# Patient Record
Sex: FEMALE | Race: WHITE | NOT HISPANIC OR LATINO | Employment: OTHER | URBAN - METROPOLITAN AREA
[De-identification: names, ages, dates, MRNs, and addresses within clinical notes are randomized per-mention and may not be internally consistent; named-entity substitution may affect disease eponyms.]

---

## 2018-07-26 ENCOUNTER — HOSPITAL ENCOUNTER (OUTPATIENT)
Dept: GENERAL RADIOLOGY | Facility: HOSPITAL | Age: 78
Discharge: HOME | End: 2018-07-26
Admitting: COLON & RECTAL SURGERY

## 2018-07-26 ENCOUNTER — APPOINTMENT (OUTPATIENT)
Dept: PREADMISSION TESTING | Facility: HOSPITAL | Age: 78
End: 2018-07-26

## 2018-07-26 ENCOUNTER — DOCUMENTATION (OUTPATIENT)
Dept: OTHER | Facility: HOSPITAL | Age: 78
End: 2018-07-26

## 2018-07-26 VITALS — HEIGHT: 63 IN | BODY MASS INDEX: 24.8 KG/M2 | WEIGHT: 139.99 LBS

## 2018-07-26 LAB
ALBUMIN SERPL-MCNC: 4.29 G/DL (ref 3.2–4.8)
ALBUMIN/GLOB SERPL: 1.3 G/DL (ref 1.5–2.5)
ALP SERPL-CCNC: 103 U/L (ref 25–100)
ALT SERPL W P-5'-P-CCNC: 17 U/L (ref 7–40)
ANION GAP SERPL CALCULATED.3IONS-SCNC: 10 MMOL/L (ref 3–11)
AST SERPL-CCNC: 27 U/L (ref 0–33)
BILIRUB SERPL-MCNC: 0.5 MG/DL (ref 0.3–1.2)
BUN BLD-MCNC: 14 MG/DL (ref 9–23)
BUN/CREAT SERPL: 15.2 (ref 7–25)
CALCIUM SPEC-SCNC: 9.1 MG/DL (ref 8.7–10.4)
CEA SERPL-MCNC: 2.3 NG/ML (ref 0–2.5)
CHLORIDE SERPL-SCNC: 102 MMOL/L (ref 99–109)
CO2 SERPL-SCNC: 28 MMOL/L (ref 20–31)
CREAT BLD-MCNC: 0.92 MG/DL (ref 0.6–1.3)
DEPRECATED RDW RBC AUTO: 47.8 FL (ref 37–54)
ERYTHROCYTE [DISTWIDTH] IN BLOOD BY AUTOMATED COUNT: 14.7 % (ref 11.3–14.5)
GFR SERPL CREATININE-BSD FRML MDRD: 59 ML/MIN/1.73
GLOBULIN UR ELPH-MCNC: 3.2 GM/DL
GLUCOSE BLD-MCNC: 120 MG/DL (ref 70–100)
HBA1C MFR BLD: 6.2 % (ref 4.8–5.6)
HCT VFR BLD AUTO: 37.6 % (ref 34.5–44)
HGB BLD-MCNC: 11.9 G/DL (ref 11.5–15.5)
MCH RBC QN AUTO: 28.6 PG (ref 27–31)
MCHC RBC AUTO-ENTMCNC: 31.6 G/DL (ref 32–36)
MCV RBC AUTO: 90.4 FL (ref 80–99)
PLATELET # BLD AUTO: 200 10*3/MM3 (ref 150–450)
PMV BLD AUTO: 10.8 FL (ref 6–12)
POTASSIUM BLD-SCNC: 4.2 MMOL/L (ref 3.5–5.5)
PROT SERPL-MCNC: 7.5 G/DL (ref 5.7–8.2)
RBC # BLD AUTO: 4.16 10*6/MM3 (ref 3.89–5.14)
SODIUM BLD-SCNC: 140 MMOL/L (ref 132–146)
WBC NRBC COR # BLD: 13.69 10*3/MM3 (ref 3.5–10.8)

## 2018-07-26 PROCEDURE — 83036 HEMOGLOBIN GLYCOSYLATED A1C: CPT | Performed by: COLON & RECTAL SURGERY

## 2018-07-26 PROCEDURE — 93005 ELECTROCARDIOGRAM TRACING: CPT

## 2018-07-26 PROCEDURE — 93010 ELECTROCARDIOGRAM REPORT: CPT | Performed by: INTERNAL MEDICINE

## 2018-07-26 PROCEDURE — 71046 X-RAY EXAM CHEST 2 VIEWS: CPT

## 2018-07-26 PROCEDURE — 80053 COMPREHEN METABOLIC PANEL: CPT | Performed by: COLON & RECTAL SURGERY

## 2018-07-26 PROCEDURE — 85027 COMPLETE CBC AUTOMATED: CPT | Performed by: COLON & RECTAL SURGERY

## 2018-07-26 PROCEDURE — 82378 CARCINOEMBRYONIC ANTIGEN: CPT | Performed by: COLON & RECTAL SURGERY

## 2018-07-26 PROCEDURE — 36415 COLL VENOUS BLD VENIPUNCTURE: CPT

## 2018-07-26 RX ORDER — CELECOXIB 200 MG/1
200 CAPSULE ORAL DAILY
COMMUNITY
End: 2018-08-05 | Stop reason: HOSPADM

## 2018-07-26 RX ORDER — LEFLUNOMIDE 20 MG/1
20 TABLET ORAL DAILY
COMMUNITY
End: 2018-08-05 | Stop reason: HOSPADM

## 2018-07-26 NOTE — PROGRESS NOTES
[facility] called me to inform me of patient having surgery on 8/1/2018. Patient is scheduled for a right colectomy with ERAS orders per Dr. Juarez. I will be available to navigate as needed. LU

## 2018-07-31 ENCOUNTER — ANESTHESIA EVENT (OUTPATIENT)
Dept: PERIOP | Facility: HOSPITAL | Age: 78
End: 2018-07-31

## 2018-07-31 RX ORDER — FAMOTIDINE 10 MG/ML
20 INJECTION, SOLUTION INTRAVENOUS ONCE
Status: CANCELLED | OUTPATIENT
Start: 2018-07-31 | End: 2018-07-31

## 2018-08-01 ENCOUNTER — HOSPITAL ENCOUNTER (INPATIENT)
Facility: HOSPITAL | Age: 78
LOS: 4 days | Discharge: HOME | End: 2018-08-05
Attending: COLON & RECTAL SURGERY | Admitting: COLON & RECTAL SURGERY

## 2018-08-01 ENCOUNTER — ANESTHESIA (OUTPATIENT)
Dept: PERIOP | Facility: HOSPITAL | Age: 78
End: 2018-08-01

## 2018-08-01 DIAGNOSIS — C18.9 COLON CANCER (HCC): ICD-10-CM

## 2018-08-01 PROBLEM — M19.90 ARTHRITIS: Status: ACTIVE | Noted: 2018-08-01

## 2018-08-01 PROBLEM — I10 ESSENTIAL HYPERTENSION: Status: ACTIVE | Noted: 2018-08-01

## 2018-08-01 LAB — POTASSIUM BLDA-SCNC: 3.94 MMOL/L (ref 3.5–5.3)

## 2018-08-01 PROCEDURE — 25010000002 PROPOFOL 10 MG/ML EMULSION: Performed by: NURSE ANESTHETIST, CERTIFIED REGISTERED

## 2018-08-01 PROCEDURE — 99221 1ST HOSP IP/OBS SF/LOW 40: CPT | Performed by: NURSE PRACTITIONER

## 2018-08-01 PROCEDURE — 25010000002 ONDANSETRON PER 1 MG: Performed by: NURSE ANESTHETIST, CERTIFIED REGISTERED

## 2018-08-01 PROCEDURE — 88342 IMHCHEM/IMCYTCHM 1ST ANTB: CPT

## 2018-08-01 PROCEDURE — 25010000002 DEXAMETHASONE SODIUM PHOSPHATE 10 MG/ML SOLUTION 1 ML VIAL: Performed by: NURSE ANESTHETIST, CERTIFIED REGISTERED

## 2018-08-01 PROCEDURE — 25010000002 NEOSTIGMINE 10 MG/10ML SOLUTION: Performed by: NURSE ANESTHETIST, CERTIFIED REGISTERED

## 2018-08-01 PROCEDURE — 25010000002 DEXAMETHASONE PER 1 MG: Performed by: NURSE ANESTHETIST, CERTIFIED REGISTERED

## 2018-08-01 PROCEDURE — 25010000002 BUPRENORPHINE PER 0.1 MG: Performed by: NURSE ANESTHETIST, CERTIFIED REGISTERED

## 2018-08-01 PROCEDURE — 25010000002 HEPARIN (PORCINE) PER 1000 UNITS: Performed by: COLON & RECTAL SURGERY

## 2018-08-01 PROCEDURE — 0DBF0ZZ EXCISION OF RIGHT LARGE INTESTINE, OPEN APPROACH: ICD-10-PCS | Performed by: COLON & RECTAL SURGERY

## 2018-08-01 PROCEDURE — 84132 ASSAY OF SERUM POTASSIUM: CPT | Performed by: ANESTHESIOLOGY

## 2018-08-01 PROCEDURE — 25010000002 FENTANYL CITRATE (PF) 100 MCG/2ML SOLUTION: Performed by: NURSE ANESTHETIST, CERTIFIED REGISTERED

## 2018-08-01 PROCEDURE — 88341 IMHCHEM/IMCYTCHM EA ADD ANTB: CPT

## 2018-08-01 PROCEDURE — 25010000002 FENTANYL CITRATE (PF) 100 MCG/2ML SOLUTION: Performed by: ANESTHESIOLOGY

## 2018-08-01 PROCEDURE — 25010000002 HYDROMORPHONE PER 4 MG: Performed by: ANESTHESIOLOGY

## 2018-08-01 PROCEDURE — 25010000002 ERTAPENEM 1 GM/100ML SOLUTION: Performed by: COLON & RECTAL SURGERY

## 2018-08-01 PROCEDURE — 25010000002 MORPHINE SULFATE (PF) 2 MG/ML SOLUTION: Performed by: COLON & RECTAL SURGERY

## 2018-08-01 PROCEDURE — 88309 TISSUE EXAM BY PATHOLOGIST: CPT | Performed by: COLON & RECTAL SURGERY

## 2018-08-01 RX ORDER — SODIUM CHLORIDE 0.9 % (FLUSH) 0.9 %
1-10 SYRINGE (ML) INJECTION AS NEEDED
Status: DISCONTINUED | OUTPATIENT
Start: 2018-08-01 | End: 2018-08-01 | Stop reason: HOSPADM

## 2018-08-01 RX ORDER — HYDROCODONE BITARTRATE AND ACETAMINOPHEN 7.5; 325 MG/1; MG/1
1 TABLET ORAL EVERY 4 HOURS PRN
Status: DISCONTINUED | OUTPATIENT
Start: 2018-08-01 | End: 2018-08-05 | Stop reason: HOSPADM

## 2018-08-01 RX ORDER — PREGABALIN 75 MG/1
75 CAPSULE ORAL ONCE
Status: COMPLETED | OUTPATIENT
Start: 2018-08-01 | End: 2018-08-01

## 2018-08-01 RX ORDER — NEOSTIGMINE METHYLSULFATE 1 MG/ML
INJECTION, SOLUTION INTRAVENOUS AS NEEDED
Status: DISCONTINUED | OUTPATIENT
Start: 2018-08-01 | End: 2018-08-01 | Stop reason: SURG

## 2018-08-01 RX ORDER — NALOXONE HCL 0.4 MG/ML
0.4 VIAL (ML) INJECTION
Status: DISCONTINUED | OUTPATIENT
Start: 2018-08-01 | End: 2018-08-05 | Stop reason: HOSPADM

## 2018-08-01 RX ORDER — OXYCODONE HYDROCHLORIDE AND ACETAMINOPHEN 5; 325 MG/1; MG/1
1 TABLET ORAL ONCE AS NEEDED
Status: DISCONTINUED | OUTPATIENT
Start: 2018-08-01 | End: 2018-08-01 | Stop reason: HOSPADM

## 2018-08-01 RX ORDER — GABAPENTIN 400 MG/1
400 CAPSULE ORAL EVERY 12 HOURS SCHEDULED
Status: COMPLETED | OUTPATIENT
Start: 2018-08-01 | End: 2018-08-04

## 2018-08-01 RX ORDER — MORPHINE SULFATE 2 MG/ML
1 INJECTION, SOLUTION INTRAMUSCULAR; INTRAVENOUS
Status: DISCONTINUED | OUTPATIENT
Start: 2018-08-01 | End: 2018-08-05 | Stop reason: HOSPADM

## 2018-08-01 RX ORDER — LIDOCAINE HYDROCHLORIDE 10 MG/ML
0.5 INJECTION, SOLUTION EPIDURAL; INFILTRATION; INTRACAUDAL; PERINEURAL ONCE AS NEEDED
Status: COMPLETED | OUTPATIENT
Start: 2018-08-01 | End: 2018-08-01

## 2018-08-01 RX ORDER — MAGNESIUM HYDROXIDE 1200 MG/15ML
LIQUID ORAL AS NEEDED
Status: DISCONTINUED | OUTPATIENT
Start: 2018-08-01 | End: 2018-08-01 | Stop reason: HOSPADM

## 2018-08-01 RX ORDER — HEPARIN SODIUM 5000 [USP'U]/ML
5000 INJECTION, SOLUTION INTRAVENOUS; SUBCUTANEOUS ONCE
Status: COMPLETED | OUTPATIENT
Start: 2018-08-01 | End: 2018-08-01

## 2018-08-01 RX ORDER — ONDANSETRON 2 MG/ML
INJECTION INTRAMUSCULAR; INTRAVENOUS AS NEEDED
Status: DISCONTINUED | OUTPATIENT
Start: 2018-08-01 | End: 2018-08-01 | Stop reason: SURG

## 2018-08-01 RX ORDER — ATRACURIUM BESYLATE 10 MG/ML
INJECTION, SOLUTION INTRAVENOUS AS NEEDED
Status: DISCONTINUED | OUTPATIENT
Start: 2018-08-01 | End: 2018-08-01 | Stop reason: SURG

## 2018-08-01 RX ORDER — ALVIMOPAN 12 MG/1
12 CAPSULE ORAL ONCE
Status: COMPLETED | OUTPATIENT
Start: 2018-08-01 | End: 2018-08-01

## 2018-08-01 RX ORDER — LABETALOL HYDROCHLORIDE 5 MG/ML
5 INJECTION, SOLUTION INTRAVENOUS
Status: DISCONTINUED | OUTPATIENT
Start: 2018-08-01 | End: 2018-08-01 | Stop reason: HOSPADM

## 2018-08-01 RX ORDER — SODIUM CHLORIDE 9 MG/ML
100 INJECTION, SOLUTION INTRAVENOUS ONCE
Status: COMPLETED | OUTPATIENT
Start: 2018-08-01 | End: 2018-08-01

## 2018-08-01 RX ORDER — HYDROMORPHONE HYDROCHLORIDE 1 MG/ML
0.5 INJECTION, SOLUTION INTRAMUSCULAR; INTRAVENOUS; SUBCUTANEOUS
Status: DISCONTINUED | OUTPATIENT
Start: 2018-08-01 | End: 2018-08-01 | Stop reason: HOSPADM

## 2018-08-01 RX ORDER — SODIUM CHLORIDE 9 MG/ML
500 INJECTION, SOLUTION INTRAVENOUS CONTINUOUS
Status: DISCONTINUED | OUTPATIENT
Start: 2018-08-01 | End: 2018-08-01

## 2018-08-01 RX ORDER — FENTANYL CITRATE 50 UG/ML
25 INJECTION, SOLUTION INTRAMUSCULAR; INTRAVENOUS
Status: COMPLETED | OUTPATIENT
Start: 2018-08-01 | End: 2018-08-01

## 2018-08-01 RX ORDER — GLYCOPYRROLATE 0.2 MG/ML
INJECTION INTRAMUSCULAR; INTRAVENOUS AS NEEDED
Status: DISCONTINUED | OUTPATIENT
Start: 2018-08-01 | End: 2018-08-01 | Stop reason: SURG

## 2018-08-01 RX ORDER — EPHEDRINE SULFATE 50 MG/ML
5 INJECTION, SOLUTION INTRAVENOUS ONCE AS NEEDED
Status: DISCONTINUED | OUTPATIENT
Start: 2018-08-01 | End: 2018-08-01 | Stop reason: HOSPADM

## 2018-08-01 RX ORDER — SODIUM CHLORIDE, SODIUM LACTATE, POTASSIUM CHLORIDE, CALCIUM CHLORIDE 600; 310; 30; 20 MG/100ML; MG/100ML; MG/100ML; MG/100ML
9 INJECTION, SOLUTION INTRAVENOUS CONTINUOUS
Status: DISCONTINUED | OUTPATIENT
Start: 2018-08-01 | End: 2018-08-01

## 2018-08-01 RX ORDER — ONDANSETRON 2 MG/ML
4 INJECTION INTRAMUSCULAR; INTRAVENOUS EVERY 6 HOURS PRN
Status: DISCONTINUED | OUTPATIENT
Start: 2018-08-01 | End: 2018-08-05 | Stop reason: HOSPADM

## 2018-08-01 RX ORDER — FENTANYL CITRATE 50 UG/ML
50 INJECTION, SOLUTION INTRAMUSCULAR; INTRAVENOUS
Status: DISCONTINUED | OUTPATIENT
Start: 2018-08-01 | End: 2018-08-01 | Stop reason: HOSPADM

## 2018-08-01 RX ORDER — AMLODIPINE BESYLATE 5 MG/1
1 TABLET ORAL EVERY 24 HOURS
COMMUNITY
End: 2018-09-17

## 2018-08-01 RX ORDER — MEPERIDINE HYDROCHLORIDE 25 MG/ML
12.5 INJECTION INTRAMUSCULAR; INTRAVENOUS; SUBCUTANEOUS
Status: DISCONTINUED | OUTPATIENT
Start: 2018-08-01 | End: 2018-08-01 | Stop reason: HOSPADM

## 2018-08-01 RX ORDER — SODIUM CHLORIDE, SODIUM LACTATE, POTASSIUM CHLORIDE, CALCIUM CHLORIDE 600; 310; 30; 20 MG/100ML; MG/100ML; MG/100ML; MG/100ML
100 INJECTION, SOLUTION INTRAVENOUS CONTINUOUS
Status: DISCONTINUED | OUTPATIENT
Start: 2018-08-01 | End: 2018-08-05 | Stop reason: HOSPADM

## 2018-08-01 RX ORDER — PROPOFOL 10 MG/ML
VIAL (ML) INTRAVENOUS AS NEEDED
Status: DISCONTINUED | OUTPATIENT
Start: 2018-08-01 | End: 2018-08-01 | Stop reason: SURG

## 2018-08-01 RX ORDER — DEXAMETHASONE SODIUM PHOSPHATE 4 MG/ML
INJECTION, SOLUTION INTRA-ARTICULAR; INTRALESIONAL; INTRAMUSCULAR; INTRAVENOUS; SOFT TISSUE AS NEEDED
Status: DISCONTINUED | OUTPATIENT
Start: 2018-08-01 | End: 2018-08-01 | Stop reason: SURG

## 2018-08-01 RX ORDER — MELOXICAM 7.5 MG/1
15 TABLET ORAL ONCE
Status: COMPLETED | OUTPATIENT
Start: 2018-08-01 | End: 2018-08-01

## 2018-08-01 RX ORDER — ACETAMINOPHEN 500 MG
1000 TABLET ORAL ONCE
Status: COMPLETED | OUTPATIENT
Start: 2018-08-01 | End: 2018-08-01

## 2018-08-01 RX ORDER — ALVIMOPAN 12 MG/1
12 CAPSULE ORAL 2 TIMES DAILY
Status: DISCONTINUED | OUTPATIENT
Start: 2018-08-02 | End: 2018-08-05 | Stop reason: HOSPADM

## 2018-08-01 RX ORDER — NALOXONE HCL 0.4 MG/ML
0.4 VIAL (ML) INJECTION AS NEEDED
Status: DISCONTINUED | OUTPATIENT
Start: 2018-08-01 | End: 2018-08-01 | Stop reason: HOSPADM

## 2018-08-01 RX ORDER — HYDRALAZINE HYDROCHLORIDE 10 MG/1
10 TABLET, FILM COATED ORAL EVERY 8 HOURS PRN
Status: DISCONTINUED | OUTPATIENT
Start: 2018-08-01 | End: 2018-08-05 | Stop reason: HOSPADM

## 2018-08-01 RX ORDER — ONDANSETRON 2 MG/ML
4 INJECTION INTRAMUSCULAR; INTRAVENOUS ONCE AS NEEDED
Status: DISCONTINUED | OUTPATIENT
Start: 2018-08-01 | End: 2018-08-01 | Stop reason: HOSPADM

## 2018-08-01 RX ORDER — SODIUM CHLORIDE 9 MG/ML
1000 INJECTION, SOLUTION INTRAVENOUS CONTINUOUS
Status: DISCONTINUED | OUTPATIENT
Start: 2018-08-01 | End: 2018-08-05 | Stop reason: HOSPADM

## 2018-08-01 RX ORDER — ACETAMINOPHEN 500 MG
1000 TABLET ORAL 3 TIMES DAILY
Status: DISCONTINUED | OUTPATIENT
Start: 2018-08-01 | End: 2018-08-05 | Stop reason: HOSPADM

## 2018-08-01 RX ORDER — DIAZEPAM 5 MG/1
5 TABLET ORAL EVERY 6 HOURS PRN
Status: DISCONTINUED | OUTPATIENT
Start: 2018-08-01 | End: 2018-08-05 | Stop reason: HOSPADM

## 2018-08-01 RX ORDER — LIDOCAINE HYDROCHLORIDE 10 MG/ML
INJECTION, SOLUTION EPIDURAL; INFILTRATION; INTRACAUDAL; PERINEURAL AS NEEDED
Status: DISCONTINUED | OUTPATIENT
Start: 2018-08-01 | End: 2018-08-01 | Stop reason: SURG

## 2018-08-01 RX ORDER — FAMOTIDINE 20 MG/1
20 TABLET, FILM COATED ORAL ONCE
Status: COMPLETED | OUTPATIENT
Start: 2018-08-01 | End: 2018-08-01

## 2018-08-01 RX ORDER — AMLODIPINE BESYLATE 5 MG/1
5 TABLET ORAL EVERY 24 HOURS
Status: DISCONTINUED | OUTPATIENT
Start: 2018-08-02 | End: 2018-08-05 | Stop reason: HOSPADM

## 2018-08-01 RX ORDER — SCOLOPAMINE TRANSDERMAL SYSTEM 1 MG/1
1 PATCH, EXTENDED RELEASE TRANSDERMAL
Status: DISCONTINUED | OUTPATIENT
Start: 2018-08-01 | End: 2018-08-01

## 2018-08-01 RX ADMIN — GLYCOPYRROLATE 0.4 MG: 0.2 INJECTION INTRAMUSCULAR; INTRAVENOUS at 16:20

## 2018-08-01 RX ADMIN — LIDOCAINE HYDROCHLORIDE 0.5 ML: 10 INJECTION, SOLUTION EPIDURAL; INFILTRATION; INTRACAUDAL; PERINEURAL at 13:55

## 2018-08-01 RX ADMIN — MELOXICAM 15 MG: 7.5 TABLET ORAL at 14:05

## 2018-08-01 RX ADMIN — LIDOCAINE HYDROCHLORIDE 40 MG: 10 INJECTION, SOLUTION EPIDURAL; INFILTRATION; INTRACAUDAL; PERINEURAL at 15:06

## 2018-08-01 RX ADMIN — NEOSTIGMINE METHYLSULFATE 3 MG: 1 INJECTION, SOLUTION INTRAVENOUS at 16:20

## 2018-08-01 RX ADMIN — FENTANYL CITRATE 25 MCG: 50 INJECTION INTRAMUSCULAR; INTRAVENOUS at 17:01

## 2018-08-01 RX ADMIN — ONDANSETRON 4 MG: 2 INJECTION INTRAMUSCULAR; INTRAVENOUS at 16:12

## 2018-08-01 RX ADMIN — GLYCOPYRROLATE 0.2 MG: 0.2 INJECTION INTRAMUSCULAR; INTRAVENOUS at 15:42

## 2018-08-01 RX ADMIN — ATRACURIUM BESYLATE 50 MG: 10 INJECTION, SOLUTION INTRAVENOUS at 15:06

## 2018-08-01 RX ADMIN — FENTANYL CITRATE 50 MCG: 50 INJECTION INTRAMUSCULAR; INTRAVENOUS at 17:30

## 2018-08-01 RX ADMIN — SODIUM CHLORIDE 1000 ML: 9 INJECTION, SOLUTION INTRAVENOUS at 13:55

## 2018-08-01 RX ADMIN — MORPHINE SULFATE 1 MG: 2 INJECTION, SOLUTION INTRAMUSCULAR; INTRAVENOUS at 19:03

## 2018-08-01 RX ADMIN — HEPARIN SODIUM 5000 UNITS: 5000 INJECTION, SOLUTION INTRAVENOUS; SUBCUTANEOUS at 14:10

## 2018-08-01 RX ADMIN — HYDROCODONE BITARTRATE AND ACETAMINOPHEN 1 TABLET: 7.5; 325 TABLET ORAL at 20:18

## 2018-08-01 RX ADMIN — HYDROMORPHONE HYDROCHLORIDE 0.5 MG: 1 INJECTION, SOLUTION INTRAMUSCULAR; INTRAVENOUS; SUBCUTANEOUS at 17:25

## 2018-08-01 RX ADMIN — FENTANYL CITRATE 25 MCG: 50 INJECTION INTRAMUSCULAR; INTRAVENOUS at 17:11

## 2018-08-01 RX ADMIN — ACETAMINOPHEN 1000 MG: 500 TABLET, FILM COATED ORAL at 14:05

## 2018-08-01 RX ADMIN — DIAZEPAM 5 MG: 5 TABLET ORAL at 23:58

## 2018-08-01 RX ADMIN — SODIUM CHLORIDE 100 ML/HR: 9 INJECTION, SOLUTION INTRAVENOUS at 18:11

## 2018-08-01 RX ADMIN — ERTAPENEM SODIUM 1 G: 1 INJECTION, POWDER, LYOPHILIZED, FOR SOLUTION INTRAMUSCULAR; INTRAVENOUS at 15:10

## 2018-08-01 RX ADMIN — GABAPENTIN 400 MG: 400 CAPSULE ORAL at 21:10

## 2018-08-01 RX ADMIN — AMLODIPINE BESYLATE 5 MG: 5 TABLET ORAL at 23:58

## 2018-08-01 RX ADMIN — PREGABALIN 75 MG: 75 CAPSULE ORAL at 14:05

## 2018-08-01 RX ADMIN — DEXAMETHASONE SODIUM PHOSPHATE 6 MG: 4 INJECTION, SOLUTION INTRAMUSCULAR; INTRAVENOUS at 15:06

## 2018-08-01 RX ADMIN — PROPOFOL 100 MG: 10 INJECTION, EMULSION INTRAVENOUS at 15:06

## 2018-08-01 RX ADMIN — FAMOTIDINE 20 MG: 20 TABLET ORAL at 14:05

## 2018-08-01 RX ADMIN — DEXAMETHASONE SODIUM PHOSPHATE 60.9 ML: 10 INJECTION, SOLUTION INTRAMUSCULAR; INTRAVENOUS at 15:07

## 2018-08-01 RX ADMIN — ALVIMOPAN 12 MG: 12 CAPSULE ORAL at 14:25

## 2018-08-01 NOTE — ANESTHESIA POSTPROCEDURE EVALUATION
Patient: Su Pedraza    Procedure Summary     Date:  08/01/18 Room / Location:   [facility]    Anesthesia Start:  1503 Anesthesia Stop:      Procedure:  extended right hemicolectomy with end bloc node dissection (Right Abdomen) Diagnosis:      Surgeon:  Clovis Juarez MD Provider:  Clovis Monet MD    Anesthesia Type:  general ASA Status:  2          Anesthesia Type: general  Last vitals  BP   169/69 (08/01/18 1403)135/66   Temp   98 °F (36.7 °C) (08/01/18 1403)97.5   Pulse   59 (08/01/18 1403)79   Resp   18 (08/01/18 1403)  16   SpO2   98 % (08/01/18 1403)94     Post Anesthesia Care and Evaluation    Patient location during evaluation: PACU  Patient participation: complete - patient participated  Level of consciousness: awake and alert  Pain score: 0  Pain management: adequate  Airway patency: patent  Anesthetic complications: No anesthetic complications  PONV Status: none  Cardiovascular status: hemodynamically stable and acceptable  Respiratory status: nonlabored ventilation, acceptable and nasal cannula  Hydration status: acceptable

## 2018-08-01 NOTE — ANESTHESIA PROCEDURE NOTES
Airway  Urgency: elective    Airway not difficult    General Information and Staff    Patient location during procedure: OR  Anesthesiologist: PATRICIA FRANCOIS  CRNA: JUAN LAZO    Indications and Patient Condition  Indications for airway management: airway protection    Preoxygenated: yes  MILS not maintained throughout  Mask difficulty assessment: 1 - vent by mask    Final Airway Details  Final airway type: endotracheal airway      Successful airway: ETT  Cuffed: yes   Successful intubation technique: direct laryngoscopy  Endotracheal tube insertion site: oral  Blade: Soo  Blade size: #3  ETT size: 7.0 mm  Cormack-Lehane Classification: grade I - full view of glottis  Placement verified by: chest auscultation and capnometry   Cuff volume (mL): 5  Measured from: lips  ETT to lips (cm): 20  Number of attempts at approach: 1    Additional Comments  Negative epigastric sounds, Breath sound equal bilaterally with symmetric chest rise and fall

## 2018-08-01 NOTE — ANESTHESIA PREPROCEDURE EVALUATION
Anesthesia Evaluation                  Airway   Mallampati: I  TM distance: >3 FB  Neck ROM: full  No difficulty expected  Dental      Pulmonary    Cardiovascular     (+) hypertension,       Neuro/Psych  GI/Hepatic/Renal/Endo      Musculoskeletal     Abdominal    Substance History      OB/GYN          Other                        Anesthesia Plan    ASA 2     general     intravenous induction   Anesthetic plan and risks discussed with patient.    Plan discussed with CRNA.

## 2018-08-01 NOTE — ANESTHESIA PROCEDURE NOTES
Peripheral Block    Patient location during procedure: OR  Reason for block: at surgeon's request and post-op pain management  Performed by  Anesthesiologist: PATRICIA FRANCOIS  CRNA: JUAN LAZO  Preanesthetic Checklist  Completed: patient identified, site marked, surgical consent, pre-op evaluation, timeout performed, IV checked, risks and benefits discussed and monitors and equipment checked  Prep:  Pt Position: supine  Sterile barriers:cap, gloves, sterile barriers and mask  Prep: ChloraPrep  Patient monitoring: blood pressure monitoring, continuous pulse oximetry and EKG  Procedure  Sedation:yes  Performed under: general  Guidance:ultrasound guided  Images:still images obtained    Laterality:Bilateral  Block Type:TAP  Injection Technique:single-shot  Needle Type:short-bevel and echogenic  Needle Gauge:20 G    Medications  Comment:Block Injection:  LA dose divided between Right and Left block       Adjuncts:  Decadron 4mg PSF, Buprenex 0.15mg (Per total volume of LA)  Local Injected:bupivacaine 0.25% Local Amount Injected:60mL  Post Assessment  Injection Assessment: negative aspiration for heme, incremental injection and no paresthesia on injection  Patient Tolerance:comfortable throughout block  Complications:no  Additional Notes      Under Ultrasound guidance, a BBraun 4inch 360 degree needle was advanced with Normal Saline hydro dissection of tissue.  The Internal Oblique and Transversus Abdominus muscles where visualized.  At or before the aponeurosis of Internal Oblique, local anesthetic spread was visualized in the Transversus Abdominus Plane. Injection was made incrementally with aspiration every 5 mls.  There was no  intravascular injection,  injection pressure was normal, there was no neural injection, and the procedure was completed without difficulty.  Thank You.

## 2018-08-02 LAB
ANION GAP SERPL CALCULATED.3IONS-SCNC: 7 MMOL/L (ref 3–11)
BASOPHILS # BLD AUTO: 0.01 10*3/MM3 (ref 0–0.2)
BASOPHILS NFR BLD AUTO: 0.1 % (ref 0–1)
BUN BLD-MCNC: 12 MG/DL (ref 9–23)
BUN/CREAT SERPL: 17.6 (ref 7–25)
CALCIUM SPEC-SCNC: 7.4 MG/DL (ref 8.7–10.4)
CHLORIDE SERPL-SCNC: 109 MMOL/L (ref 99–109)
CO2 SERPL-SCNC: 23 MMOL/L (ref 20–31)
CREAT BLD-MCNC: 0.68 MG/DL (ref 0.6–1.3)
DEPRECATED RDW RBC AUTO: 50.6 FL (ref 37–54)
EOSINOPHIL # BLD AUTO: 0 10*3/MM3 (ref 0–0.3)
EOSINOPHIL NFR BLD AUTO: 0 % (ref 0–3)
ERYTHROCYTE [DISTWIDTH] IN BLOOD BY AUTOMATED COUNT: 14.8 % (ref 11.3–14.5)
GFR SERPL CREATININE-BSD FRML MDRD: 83 ML/MIN/1.73
GLUCOSE BLD-MCNC: 135 MG/DL (ref 70–100)
HCT VFR BLD AUTO: 35.7 % (ref 34.5–44)
HGB BLD-MCNC: 11 G/DL (ref 11.5–15.5)
IMM GRANULOCYTES # BLD: 0.03 10*3/MM3 (ref 0–0.03)
IMM GRANULOCYTES NFR BLD: 0.2 % (ref 0–0.6)
LYMPHOCYTES # BLD AUTO: 4.07 10*3/MM3 (ref 0.6–4.8)
LYMPHOCYTES NFR BLD AUTO: 31.9 % (ref 24–44)
MCH RBC QN AUTO: 28.6 PG (ref 27–31)
MCHC RBC AUTO-ENTMCNC: 30.8 G/DL (ref 32–36)
MCV RBC AUTO: 93 FL (ref 80–99)
MONOCYTES # BLD AUTO: 0.58 10*3/MM3 (ref 0–1)
MONOCYTES NFR BLD AUTO: 4.6 % (ref 0–12)
NEUTROPHILS # BLD AUTO: 8.05 10*3/MM3 (ref 1.5–8.3)
NEUTROPHILS NFR BLD AUTO: 63.2 % (ref 41–71)
PLAT MORPH BLD: NORMAL
PLATELET # BLD AUTO: 175 10*3/MM3 (ref 150–450)
PMV BLD AUTO: 11.4 FL (ref 6–12)
POTASSIUM BLD-SCNC: 4.5 MMOL/L (ref 3.5–5.5)
RBC # BLD AUTO: 3.84 10*6/MM3 (ref 3.89–5.14)
RBC MORPH BLD: NORMAL
SODIUM BLD-SCNC: 139 MMOL/L (ref 132–146)
WBC MORPH BLD: NORMAL
WBC NRBC COR # BLD: 12.74 10*3/MM3 (ref 3.5–10.8)

## 2018-08-02 PROCEDURE — 99233 SBSQ HOSP IP/OBS HIGH 50: CPT | Performed by: HOSPITALIST

## 2018-08-02 PROCEDURE — 25010000002 ENOXAPARIN PER 10 MG: Performed by: COLON & RECTAL SURGERY

## 2018-08-02 PROCEDURE — 80048 BASIC METABOLIC PNL TOTAL CA: CPT | Performed by: COLON & RECTAL SURGERY

## 2018-08-02 PROCEDURE — 85025 COMPLETE CBC W/AUTO DIFF WBC: CPT | Performed by: COLON & RECTAL SURGERY

## 2018-08-02 PROCEDURE — 85007 BL SMEAR W/DIFF WBC COUNT: CPT | Performed by: COLON & RECTAL SURGERY

## 2018-08-02 RX ADMIN — ALVIMOPAN 12 MG: 12 CAPSULE ORAL at 08:02

## 2018-08-02 RX ADMIN — ACETAMINOPHEN 1000 MG: 500 TABLET, FILM COATED ORAL at 08:02

## 2018-08-02 RX ADMIN — GABAPENTIN 400 MG: 400 CAPSULE ORAL at 08:02

## 2018-08-02 RX ADMIN — HYDROCODONE BITARTRATE AND ACETAMINOPHEN 1 TABLET: 7.5; 325 TABLET ORAL at 16:06

## 2018-08-02 RX ADMIN — ENOXAPARIN SODIUM 40 MG: 40 INJECTION SUBCUTANEOUS at 08:02

## 2018-08-02 RX ADMIN — HYDROCODONE BITARTRATE AND ACETAMINOPHEN 1 TABLET: 7.5; 325 TABLET ORAL at 08:02

## 2018-08-02 RX ADMIN — ACETAMINOPHEN 1000 MG: 500 TABLET, FILM COATED ORAL at 21:13

## 2018-08-02 RX ADMIN — GABAPENTIN 400 MG: 400 CAPSULE ORAL at 21:13

## 2018-08-02 RX ADMIN — ALVIMOPAN 12 MG: 12 CAPSULE ORAL at 21:13

## 2018-08-03 PROCEDURE — 25010000002 ENOXAPARIN PER 10 MG: Performed by: COLON & RECTAL SURGERY

## 2018-08-03 PROCEDURE — 94799 UNLISTED PULMONARY SVC/PX: CPT

## 2018-08-03 PROCEDURE — 99232 SBSQ HOSP IP/OBS MODERATE 35: CPT | Performed by: NURSE PRACTITIONER

## 2018-08-03 PROCEDURE — 25010000002 ONDANSETRON PER 1 MG: Performed by: COLON & RECTAL SURGERY

## 2018-08-03 RX ADMIN — ENOXAPARIN SODIUM 40 MG: 40 INJECTION SUBCUTANEOUS at 09:28

## 2018-08-03 RX ADMIN — ALVIMOPAN 12 MG: 12 CAPSULE ORAL at 20:42

## 2018-08-03 RX ADMIN — AMLODIPINE BESYLATE 5 MG: 5 TABLET ORAL at 00:25

## 2018-08-03 RX ADMIN — GABAPENTIN 400 MG: 400 CAPSULE ORAL at 09:28

## 2018-08-03 RX ADMIN — HYDROCODONE BITARTRATE AND ACETAMINOPHEN 1 TABLET: 7.5; 325 TABLET ORAL at 09:28

## 2018-08-03 RX ADMIN — ALVIMOPAN 12 MG: 12 CAPSULE ORAL at 09:28

## 2018-08-03 RX ADMIN — ONDANSETRON 4 MG: 2 INJECTION INTRAMUSCULAR; INTRAVENOUS at 09:23

## 2018-08-03 RX ADMIN — ACETAMINOPHEN 1000 MG: 500 TABLET, FILM COATED ORAL at 20:42

## 2018-08-03 RX ADMIN — GABAPENTIN 400 MG: 400 CAPSULE ORAL at 20:42

## 2018-08-03 RX ADMIN — DIAZEPAM 5 MG: 5 TABLET ORAL at 11:07

## 2018-08-04 PROCEDURE — 25010000002 ENOXAPARIN PER 10 MG: Performed by: COLON & RECTAL SURGERY

## 2018-08-04 PROCEDURE — 99232 SBSQ HOSP IP/OBS MODERATE 35: CPT | Performed by: NURSE PRACTITIONER

## 2018-08-04 RX ADMIN — ACETAMINOPHEN 1000 MG: 500 TABLET, FILM COATED ORAL at 21:29

## 2018-08-04 RX ADMIN — ALVIMOPAN 12 MG: 12 CAPSULE ORAL at 21:29

## 2018-08-04 RX ADMIN — ENOXAPARIN SODIUM 40 MG: 40 INJECTION SUBCUTANEOUS at 10:16

## 2018-08-04 RX ADMIN — HYDROCODONE BITARTRATE AND ACETAMINOPHEN 1 TABLET: 7.5; 325 TABLET ORAL at 10:15

## 2018-08-04 RX ADMIN — GABAPENTIN 400 MG: 400 CAPSULE ORAL at 10:16

## 2018-08-04 RX ADMIN — AMLODIPINE BESYLATE 5 MG: 5 TABLET ORAL at 00:19

## 2018-08-04 RX ADMIN — ACETAMINOPHEN 1000 MG: 500 TABLET, FILM COATED ORAL at 16:44

## 2018-08-04 RX ADMIN — ALVIMOPAN 12 MG: 12 CAPSULE ORAL at 10:15

## 2018-08-05 VITALS
RESPIRATION RATE: 16 BRPM | HEART RATE: 86 BPM | HEIGHT: 63 IN | WEIGHT: 139.99 LBS | OXYGEN SATURATION: 94 % | BODY MASS INDEX: 24.8 KG/M2 | SYSTOLIC BLOOD PRESSURE: 151 MMHG | TEMPERATURE: 98.1 F | DIASTOLIC BLOOD PRESSURE: 82 MMHG

## 2018-08-05 PROCEDURE — 25010000002 ENOXAPARIN PER 10 MG: Performed by: COLON & RECTAL SURGERY

## 2018-08-05 PROCEDURE — 25010000002 ONDANSETRON PER 1 MG: Performed by: COLON & RECTAL SURGERY

## 2018-08-05 PROCEDURE — 99239 HOSP IP/OBS DSCHRG MGMT >30: CPT | Performed by: NURSE PRACTITIONER

## 2018-08-05 RX ADMIN — ONDANSETRON 4 MG: 2 INJECTION INTRAMUSCULAR; INTRAVENOUS at 10:08

## 2018-08-05 RX ADMIN — AMLODIPINE BESYLATE 5 MG: 5 TABLET ORAL at 00:16

## 2018-08-05 RX ADMIN — ACETAMINOPHEN 1000 MG: 500 TABLET, FILM COATED ORAL at 08:37

## 2018-08-05 RX ADMIN — ACETAMINOPHEN 1000 MG: 500 TABLET, FILM COATED ORAL at 16:04

## 2018-08-05 RX ADMIN — ALVIMOPAN 12 MG: 12 CAPSULE ORAL at 10:10

## 2018-08-05 RX ADMIN — ENOXAPARIN SODIUM 40 MG: 40 INJECTION SUBCUTANEOUS at 08:37

## 2018-08-06 ENCOUNTER — READMISSION MANAGEMENT (OUTPATIENT)
Dept: CALL CENTER | Facility: HOSPITAL | Age: 78
End: 2018-08-06

## 2018-08-06 NOTE — OUTREACH NOTE
Prep Survey      Responses   Facility patient discharged from?  [place]   Is patient eligible?  Yes   Discharge diagnosis  s/p open colectomy   Does the patient have one of the following disease processes/diagnoses(primary or secondary)?  General Surgery   Does the patient have Home health ordered?  No   Is there a DME ordered?  No   Prep survey completed?  Yes          Yancy Alonzo RN

## 2018-08-07 ENCOUNTER — READMISSION MANAGEMENT (OUTPATIENT)
Dept: CALL CENTER | Facility: HOSPITAL | Age: 78
End: 2018-08-07

## 2018-08-07 NOTE — OUTREACH NOTE
General Surgery Week 1 Survey      Responses   Facility patient discharged from?  [place]   Does the patient have one of the following disease processes/diagnoses(primary or secondary)?  General Surgery   Is there a successful TCM telephone encounter documented?  No   Week 1 attempt successful?  No   Unsuccessful attempts  Attempt 1          Clovis Das RN

## 2018-08-08 ENCOUNTER — READMISSION MANAGEMENT (OUTPATIENT)
Dept: CALL CENTER | Facility: HOSPITAL | Age: 78
End: 2018-08-08

## 2018-08-08 NOTE — OUTREACH NOTE
General Surgery Week 1 Survey      Responses   Facility patient discharged from?  [place]   Does the patient have one of the following disease processes/diagnoses(primary or secondary)?  General Surgery   Is there a successful TCM telephone encounter documented?  No   Week 1 attempt successful?  Yes   Call start time  0832   Call end time  0837   Discharge diagnosis  s/p open colectomy   Meds reviewed with patient/caregiver?  Yes   Is the patient having any side effects they believe may be caused by any medication additions or changes?  No   Does the patient have all medications related to this admission filled (includes all antibiotics, pain medications, etc.)  N/A   Is the patient taking all medications as directed (includes completed medication regime)?  Yes   Does the patient have a follow up appointment scheduled with their surgeon?  Yes   Has the patient kept scheduled appointments due by today?  N/A   Comments  Dr. Quintanilla 8/14/18 Dr. Juarez on 8/23/18.    Has home health visited the patient within 72 hours of discharge?  N/A   Has all DME been delivered?  No   Psychosocial issues?  No   Did the patient receive a copy of their discharge instructions?  Yes   Nursing interventions  Reviewed instructions with patient   What is the patient's perception of their health status since discharge?  Improving   Is the patient /caregiver able to teach back basic post-op care?  Continue use of incentive spirometry at least 1 week post discharge, Practice 'cough and deep breath', Keep incision areas clean,dry and protected, Lifting as instructed by MD in discharge instructions, No tub bath, swimming, or hot tub until instructed by MD   Is the patient/caregiver able to teach back signs and symptoms of incisional infection?  Increased redness, swelling or pain at the incisonal site, Incisional warmth, Pus or odor from incision   Is the patient/caregiver able to teach back steps to recovery at home?  Set small, achievable  goals for return to baseline health, Rest and rebuild strength, gradually increase activity   Is the patient/caregiver able to teach back the hierarchy of who to call/visit for symptoms/problems? PCP, Specialist, Home health nurse, Urgent Care, ED, 911  Yes   Week 1 call completed?  Yes          Jeromy Frote RN

## 2018-08-16 ENCOUNTER — READMISSION MANAGEMENT (OUTPATIENT)
Dept: CALL CENTER | Facility: HOSPITAL | Age: 78
End: 2018-08-16

## 2018-08-16 NOTE — OUTREACH NOTE
General Surgery Week 2 Survey      Responses   Facility patient discharged from?  [place]   Does the patient have one of the following disease processes/diagnoses(primary or secondary)?  General Surgery   Week 2 attempt successful?  No   Unsuccessful attempts  Attempt 1          Jeromy Forte RN

## 2018-08-20 ENCOUNTER — READMISSION MANAGEMENT (OUTPATIENT)
Dept: CALL CENTER | Facility: HOSPITAL | Age: 78
End: 2018-08-20

## 2018-08-20 NOTE — OUTREACH NOTE
General Surgery Week 2 Survey      Responses   Facility patient discharged from?  [place]   Does the patient have one of the following disease processes/diagnoses(primary or secondary)?  General Surgery   Week 2 attempt successful?  No   Unsuccessful attempts  Attempt 2          Imer Solano RN

## 2018-08-22 ENCOUNTER — READMISSION MANAGEMENT (OUTPATIENT)
Dept: CALL CENTER | Facility: HOSPITAL | Age: 78
End: 2018-08-22

## 2018-08-22 NOTE — OUTREACH NOTE
General Surgery Week 2 Survey      Responses   Facility patient discharged from?  [place]   Does the patient have one of the following disease processes/diagnoses(primary or secondary)?  General Surgery   Week 2 attempt successful?  Yes   Call start time  1433   Call end time  1435   Discharge diagnosis  s/p open colectomy   Is patient permission given to speak with other caregiver?  Yes   Person spoke with today (if not patient) and relationship  Hood Pérez Lotus   Meds reviewed with patient/caregiver?  Yes   Is the patient having any side effects they believe may be caused by any medication additions or changes?  No   Does the patient have all medications related to this admission filled (includes all antibiotics, pain medications, etc.)  N/A   Is the patient taking all medications as directed (includes completed medication regime)?  Yes   Does the patient have a follow up appointment scheduled with their surgeon?  Yes   Has the patient kept scheduled appointments due by today?  Yes   Comments  He reports no issues with appts.   Has home health visited the patient within 72 hours of discharge?  N/A   Has all DME been delivered?  No   Psychosocial issues?  No   Comments  Lotus reports no issues today.   Did the patient receive a copy of their discharge instructions?  Yes   Nursing interventions  Reviewed instructions with patient   What is the patient's perception of their health status since discharge?  Improving   Nursing interventions  Nurse provided patient education   Is the patient /caregiver able to teach back basic post-op care?  Continue use of incentive spirometry at least 1 week post discharge, Practice 'cough and deep breath', Keep incision areas clean,dry and protected, Lifting as instructed by MD in discharge instructions, No tub bath, swimming, or hot tub until instructed by MD   Is the patient/caregiver able to teach back signs and symptoms of incisional infection?  Increased redness,  swelling or pain at the incisonal site, Incisional warmth, Pus or odor from incision   Is the patient/caregiver able to teach back steps to recovery at home?  Set small, achievable goals for return to baseline health, Rest and rebuild strength, gradually increase activity   Is the patient/caregiver able to teach back the hierarchy of who to call/visit for symptoms/problems? PCP, Specialist, Home health nurse, Urgent Care, ED, 911  Yes   Week 2 call completed?  Yes          Clovis Das RN

## 2018-08-27 ENCOUNTER — DOCUMENTATION (OUTPATIENT)
Dept: OTHER | Facility: HOSPITAL | Age: 78
End: 2018-08-27

## 2018-08-27 NOTE — PROGRESS NOTES
I faxed over a fax cover sheet to [facility] requesting path slides and discs for patient to be presented at [facility] on 8/30/2018. I also faxed over [facility]'s order for prior outside slides to be reviewed for consultation with [facility]. AG

## 2018-08-28 ENCOUNTER — DOCUMENTATION (OUTPATIENT)
Dept: OTHER | Facility: HOSPITAL | Age: 78
End: 2018-08-28

## 2018-08-28 NOTE — PROGRESS NOTES
Radiology called me and said that they have received a disc via [employer] today. I went down and filled out a sheet for them to download disc into the system. Disc had CT abd/pelvis from 7/6/2018 done at [facility]. AG I gave disc with completed paperwork back to the lady at the Radiology desk. AG

## 2018-08-30 ENCOUNTER — LAB REQUISITION (OUTPATIENT)
Dept: LAB | Facility: HOSPITAL | Age: 78
End: 2018-08-30

## 2018-08-30 DIAGNOSIS — C18.9 MALIGNANT NEOPLASM OF COLON (HCC): ICD-10-CM

## 2018-08-30 LAB
CYTO UR: NORMAL
LAB AP CASE REPORT: NORMAL
LAB AP CLINICAL INFORMATION: NORMAL
LAB AP DIAGNOSIS COMMENT: NORMAL
PATH REPORT.FINAL DX SPEC: NORMAL
PATH REPORT.GROSS SPEC: NORMAL

## 2018-08-30 PROCEDURE — 88321 CONSLTJ&REPRT SLD PREP ELSWR: CPT | Performed by: SURGERY

## 2018-09-01 ENCOUNTER — READMISSION MANAGEMENT (OUTPATIENT)
Dept: CALL CENTER | Facility: HOSPITAL | Age: 78
End: 2018-09-01

## 2018-09-01 NOTE — OUTREACH NOTE
General Surgery Week 3 Survey      Responses   Facility patient discharged from?  [place]   Does the patient have one of the following disease processes/diagnoses(primary or secondary)?  General Surgery   Week 3 attempt successful?  No   Unsuccessful attempts  Attempt 1          Theresa Benites RN

## 2018-09-05 ENCOUNTER — READMISSION MANAGEMENT (OUTPATIENT)
Dept: CALL CENTER | Facility: HOSPITAL | Age: 78
End: 2018-09-05

## 2018-09-05 NOTE — OUTREACH NOTE
General Surgery Week 3 Survey      Responses   Facility patient discharged from?  [place]   Does the patient have one of the following disease processes/diagnoses(primary or secondary)?  General Surgery   Week 3 attempt successful?  Yes   Call start time  1001   Rescheduled  Rescheduled-pt requested   Call end time  1002          Yancy Alonzo RN

## 2018-09-06 ENCOUNTER — READMISSION MANAGEMENT (OUTPATIENT)
Dept: CALL CENTER | Facility: HOSPITAL | Age: 78
End: 2018-09-06

## 2018-09-06 NOTE — OUTREACH NOTE
General Surgery Week 3 Survey      Responses   Facility patient discharged from?  [place]   Does the patient have one of the following disease processes/diagnoses(primary or secondary)?  General Surgery   Week 3 attempt successful?  Yes   Call start time  1217   Call end time  1220   Discharge diagnosis  s/p open colectomy   Meds reviewed with patient/caregiver?  Yes   Is the patient having any side effects they believe may be caused by any medication additions or changes?  No   Does the patient have all medications related to this admission filled (includes all antibiotics, pain medications, etc.)  N/A   Is the patient taking all medications as directed (includes completed medication regime)?  Yes   Does the patient have a follow up appointment scheduled with their surgeon?  Yes   Has the patient kept scheduled appointments due by today?  Yes   Comments  oncology 09/17   Has home health visited the patient within 72 hours of discharge?  N/A   Psychosocial issues?  No   Did the patient receive a copy of their discharge instructions?  Yes   What is the patient's perception of their health status since discharge?  Improving   Is the patient/caregiver able to teach back signs and symptoms of incisional infection?  -- [S/S infection reviewed]   Is the patient/caregiver able to teach back the hierarchy of who to call/visit for symptoms/problems? PCP, Specialist, Home health nurse, Urgent Care, ED, 911  Yes   Week 3 call completed?  Yes          Romy Estrada RN

## 2018-09-17 ENCOUNTER — CONSULT (OUTPATIENT)
Dept: ONCOLOGY | Facility: CLINIC | Age: 78
End: 2018-09-17

## 2018-09-17 VITALS
RESPIRATION RATE: 16 BRPM | HEART RATE: 50 BPM | SYSTOLIC BLOOD PRESSURE: 186 MMHG | HEIGHT: 63 IN | BODY MASS INDEX: 25.04 KG/M2 | TEMPERATURE: 97.8 F | DIASTOLIC BLOOD PRESSURE: 86 MMHG | WEIGHT: 141.3 LBS | OXYGEN SATURATION: 99 %

## 2018-09-17 DIAGNOSIS — C18.9 MALIGNANT NEOPLASM OF COLON, UNSPECIFIED PART OF COLON (HCC): Primary | ICD-10-CM

## 2018-09-17 PROCEDURE — 99215 OFFICE O/P EST HI 40 MIN: CPT | Performed by: INTERNAL MEDICINE

## 2018-09-17 RX ORDER — RANITIDINE 150 MG/1
150 TABLET ORAL AS NEEDED
COMMUNITY
End: 2019-10-22

## 2018-09-17 RX ORDER — ATENOLOL 50 MG/1
50 TABLET ORAL DAILY
COMMUNITY

## 2018-09-17 RX ORDER — LIDOCAINE AND PRILOCAINE 25; 25 MG/G; MG/G
CREAM TOPICAL AS NEEDED
Qty: 30 G | Refills: 3 | Status: SHIPPED | OUTPATIENT
Start: 2018-09-17 | End: 2019-04-08 | Stop reason: SDUPTHER

## 2018-09-17 NOTE — PROGRESS NOTES
DATE OF CONSULTATION: 9/17/2018    REFERRING PHYSICIAN: Clovis Juarez MD    Dear Dr. Juarez,  Thank you for asking for my medical advice on this patient. 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    HISTORY OF PRESENT ILLNESS: The patient is a very pleasant 78 y.o.  female   who was in her usual state of health until approximately July 2018. The patient presented to [facility] for gastritis and right lower quadrant pain.  This has been going on for the last few months associated with nausea but no vomiting.  She's been having one of dark stools.  Never had this problem before.  She had previous negative colonoscopy.  Colon polyp was found and biopsied. Surgical pathology 07/04/18 revealed invasive poorly differentiated adenocarcinoma in ascending colon. CT abdomen and pelvis 07/06/18 showed mass within the ascending colon with large mercy mass in the adjacent mesentery and prominent nodes around the cecum. The patient presented to Dr Juarez for elective right hemicolectomy on 08/01/18. Pathology 08/01/18 from colon and terminal ileum resection revealed poorly differentiated colonic adenocarcinoma ([id number]) with metastatic adenocarcinoma to 1 of 25 lymph nodes.     SUBJECTIVE: The patient is here today with her niece. She complains of widespread joint pain and chronic fatigue. She denies headaches, nausea and vomiting. She is anxious about her appointment today.       Review of Systems   Constitutional: Positive for fatigue. Negative for activity change, appetite change, chills, fever and unexpected weight change.   HENT: Negative for hearing loss, mouth sores, nosebleeds, sore throat and trouble swallowing.    Eyes: Negative for visual disturbance.   Respiratory: Negative for cough, chest tightness, shortness of breath and wheezing.    Cardiovascular: Negative for chest pain,  palpitations and leg swelling.   Gastrointestinal: Negative for abdominal distention, abdominal pain, blood in stool, constipation, diarrhea, nausea, rectal pain and vomiting.   Endocrine: Negative for cold intolerance and heat intolerance.   Genitourinary: Negative for difficulty urinating, dysuria, frequency and urgency.   Musculoskeletal: Positive for arthralgias and myalgias. Negative for back pain, gait problem and joint swelling.   Skin: Negative for rash.   Neurological: Negative for dizziness, tremors, syncope, weakness, light-headedness, numbness and headaches.   Hematological: Negative for adenopathy. Does not bruise/bleed easily.   Psychiatric/Behavioral: Negative for confusion, sleep disturbance and suicidal ideas. The patient is nervous/anxious.        Past Medical History:   Diagnosis Date   • Arthritis     hands and legs    • Cancer (CMS/HCC)     colon cancer, uterine cancer   • Full dentures    • Hypertension        Social History     Social History   • Marital status:      Spouse name: N/A   • Number of children: N/A   • Years of education: N/A     Occupational History   • Not on file.     Social History Main Topics   • Smoking status: Never Smoker   • Smokeless tobacco: Never Used   • Alcohol use No   • Drug use: No   • Sexual activity: Defer     Other Topics Concern   • Not on file     Social History Narrative   • No narrative on file       No family history on file.    Past Surgical History:   Procedure Laterality Date   • APPENDECTOMY     • CARPAL TUNNEL RELEASE      bilat    • CATARACT EXTRACTION Bilateral    • CHOLECYSTECTOMY     • COLON RESECTION Right 8/1/2018    Procedure: extended right hemicolectomy with end bloc node dissection;  Surgeon: Clovis Juarez MD;  Location: [facility];  Service: General   • COLONOSCOPY     • ENDOSCOPY     • HYSTERECTOMY      partial still have both ovaries    • TONSILLECTOMY AND ADENOIDECTOMY     • WISDOM TOOTH EXTRACTION         Allergies   Allergen  "Reactions   • Codeine Sulfate Nausea And Vomiting          Current Outpatient Prescriptions:   •  atenolol (TENORMIN) 50 MG tablet, Take 50 mg by mouth Daily., Disp: , Rfl:   •  raNITIdine (ZANTAC) 150 MG tablet, Take 150 mg by mouth As Needed for Heartburn., Disp: , Rfl:   •  lidocaine-prilocaine (EMLA) 2.5-2.5 % cream, Apply  topically to the appropriate area as directed As Needed (45-60 minutes prior to port access.  Cover with saran/plastic wrap.)., Disp: 30 g, Rfl: 3    PHYSICAL EXAMINATION:   BP (!) 186/86 Comment: pt nervous.  Pulse 50   Temp 97.8 °F (36.6 °C) (Temporal Artery )   Resp 16   Ht 160 cm (62.99\")   Wt 64.1 kg (141 lb 4.8 oz)   SpO2 99% Comment: RA  BMI 25.04 kg/m²    ECOG Performance Status: 1 - Symptomatic but completely ambulatory  General Appearance:  alert, cooperative, no apparent distress and appears stated age   Neurologic/Psychiatric: A&O x 3, gait steady, appropriate affect, strength 5/5 in all muscle groups   HEENT:  Normocephalic, without obvious abnormality, mucous membranes moist   Neck: Supple, symmetrical, trachea midline, no adenopathy;  No thyromegaly, masses, or tenderness   Lungs:   Clear to auscultation bilaterally; respirations regular, even, and unlabored bilaterally   Heart:  Regular rate and rhythm, no murmurs appreciated   Abdomen:   Soft, non-tender, non-distended and no organomegaly   Lymph nodes: No cervical, supraclavicular, inguinal or axillary adenopathy noted   Extremities: Normal, atraumatic; no clubbing, cyanosis, or edema    Skin: No rashes, ulcers, or suspicious lesions noted       No visits with results within 2 Week(s) from this visit.   Latest known visit with results is:   Lab Requisition on 08/30/2018   Component Date Value Ref Range Status   • Case Report 08/30/2018    Final                    Value:Surgical Pathology Report                         Case: [id number]                                  Authorizing Provider:  Dercek Hogue,   " Collected:           08/30/2018 09:25 AM                                 MD                                                                           Pathologist:           Jos Goddard MD      Received:            08/30/2018 09:25 AM          Specimen:    Large Intestine, Right / Ascending Colon, Slide Consult from [facility] 06/27/18 at the request of Dr. Hogue/Dr. Juarez for tumor board              • Clinical Information 08/30/2018    Final                    Value:This result contains rich text formatting which cannot be displayed here.   • Final Diagnosis 08/30/2018    Final                    Value:This result contains rich text formatting which cannot be displayed here.   • Comment 08/30/2018    Final                    Value:This result contains rich text formatting which cannot be displayed here.   • Gross Description 08/30/2018    Final                    Value:This result contains rich text formatting which cannot be displayed here.   • Microscopic Description 08/30/2018    Final                    Value:This result contains rich text formatting which cannot be displayed here.        Ct Outside Films    Result Date: 8/29/2018  Narrative: This procedure was auto-finalized with no dictation required.        DIAGNOSTIC DATA:   1. Radiology: as mentioned below.        2. Dr. Francis note from 08/01/18 reviewed by me and documented in the  chart.     3. Pathology report:  08/01/18: poorly differentiated colonic adenocarcinoma with metastasis to 1 of 25 lymph nodes.    4. Laboratory data:   Glucose 70 - 100 mg/dL 135     BUN 9 - 23 mg/dL 12    Creatinine 0.60 - 1.30 mg/dL 0.68    Sodium 132 - 146 mmol/L 139    Potassium 3.5 - 5.5 mmol/L 4.5    Chloride 99 - 109 mmol/L 109    CO2 20.0 - 31.0 mmol/L 23.0    Calcium 8.7 - 10.4 mg/dL 7.4     eGFR Non African Amer >60 mL/min/1.73 83    BUN/Creatinine Ratio 7.0 - 25.0 17.6    Anion Gap 3.0 - 11.0 mmol/L 7.0    View  Full Report  Narrative     National [employer] Guidelines    Stage     Description        GFR  1         Normal or High     90+  2         Mild decrease      60-89  3         Moderate decrease  30-59  4         Severe decrease    15-29  5         Kidney failure     <15                  CBC & Differential   Order: [id number]   Collected:  8/2/2018 06:36   View Full Report              CBC Auto Differential   Order: [id number] - Part of Panel Order [id number]   Collected:  8/2/2018 06:36    Ref Range & Units 1mo ago   WBC 3.50 - 10.80 10*3/mm3 12.74     RBC 3.89 - 5.14 10*6/mm3 3.84     Hemoglobin 11.5 - 15.5 g/dL 11.0     Hematocrit 34.5 - 44.0 % 35.7    MCV 80.0 - 99.0 fL 93.0    MCH 27.0 - 31.0 pg 28.6    MCHC 32.0 - 36.0 g/dL 30.8     RDW 11.3 - 14.5 % 14.8     RDW-SD 37.0 - 54.0 fl 50.6    MPV 6.0 - 12.0 fL 11.4    Platelets 150 - 450 10*3/mm3 175    Neutrophil % 41.0 - 71.0 % 63.2    Lymphocyte % 24.0 - 44.0 % 31.9    Monocyte % 0.0 - 12.0 % 4.6    Eosinophil % 0.0 - 3.0 % 0.0    Basophil % 0.0 - 1.0 % 0.1    Immature Grans % 0.0 - 0.6 % 0.2    Neutrophils, Absolute 1.50 - 8.30 10*3/mm3 8.05    Lymphocytes, Absolute 0.60 - 4.80 10*3/mm3 4.07    Monocytes, Absolute 0.00 - 1.00 10*3/mm3 0.58    Eosinophils, Absolute 0.00 - 0.30 10*3/mm3 0.00    Basophils, Absolute 0.00 - 0.20 10*3/mm3 0.01    Immature Grans, Absolute 0.00 - 0.03 10*3/mm3 0.03    View Full Report          ASSESSMENT: The patient is a very pleasant 78 y.o.  female  with right sided colon cancer    PROBLEM LIST:   1.  Ascending colon adenocarcinoma T3 N1 M0 stage IIIa:  A.  Presenting with abdominal pain and irregular bowel movements  B.  Diagnosed after colonoscopy with a biopsy done on June 27, 2018  C.  Status post right hemicolectomy done by Dr. Marvin Butler 01, 2018  D.  Final pathology revealed with 1 out of 25 positive mesenteric lymph nodes  E. CEA normal at diagnosis  2.  Hypertension  3.  Normocytic anemia    PLAN:   1. I had a long  discussion today with the patient and her niece about her  new diagnosis of colon cancer. I did go over the final pathology report in  detail with both of them. I reviewed the patient's documents including refereing provider's notes, lab results, imaging, and path report.   2.  I explained to the patient that she would benefit from adjuvant chemotherapy.  Patient is interested.  3.  The patient would be treated with Xeloda plus oxaliplatin for 3-6 month.  Patient is definitely a candidate for 3 months course given the fact that she does not have any high-risk features with only one positive lymph node and T3 lesion.  4.  I will order CT chest with contrast to complete her staging workup.  5.  I will arrange for port placement.  I discussed the case with Dr. Ku who kindly agreed to insert the catheter.  6.  I did go over potential side effects of treatment including diarrhea and skin rash as well as peripheral neuropathy.  I will set her up for premedication with my nurse practitioner.  7.  The patient would follow-up with me in 4 weeks for cycle #2.  8.  I will start the patient on Zofran as needed for chemotherapy induced nausea.  9.  We will monitor patient blood pressure while she is on active chemotherapy.  I might have to hold her antihypertensive agent.  10.  I explained to the patient there is no value repeating her CEA level since it was normal preoperatively.  Schuyler Coleman MD  9/17/2018

## 2018-09-19 ENCOUNTER — ANESTHESIA EVENT (OUTPATIENT)
Dept: PERIOP | Facility: HOSPITAL | Age: 78
End: 2018-09-19

## 2018-09-19 ENCOUNTER — SPECIALTY PHARMACY (OUTPATIENT)
Dept: ONCOLOGY | Facility: HOSPITAL | Age: 78
End: 2018-09-19

## 2018-09-19 DIAGNOSIS — C18.9 MALIGNANT NEOPLASM OF COLON, UNSPECIFIED PART OF COLON (HCC): ICD-10-CM

## 2018-09-19 DIAGNOSIS — C18.9 MALIGNANT NEOPLASM OF COLON, UNSPECIFIED PART OF COLON (HCC): Primary | ICD-10-CM

## 2018-09-19 RX ORDER — PALONOSETRON 0.05 MG/ML
0.25 INJECTION, SOLUTION INTRAVENOUS ONCE
Status: CANCELLED | OUTPATIENT
Start: 2018-10-16

## 2018-09-19 RX ORDER — DEXTROSE MONOHYDRATE 50 MG/ML
250 INJECTION, SOLUTION INTRAVENOUS ONCE
Status: CANCELLED | OUTPATIENT
Start: 2018-09-25

## 2018-09-19 RX ORDER — CAPECITABINE 500 MG/1
TABLET, FILM COATED ORAL
Qty: 84 TABLET | Refills: 3 | Status: SHIPPED | OUTPATIENT
Start: 2018-09-19 | End: 2018-12-04 | Stop reason: SDUPTHER

## 2018-09-19 RX ORDER — DEXTROSE MONOHYDRATE 50 MG/ML
250 INJECTION, SOLUTION INTRAVENOUS ONCE
Status: CANCELLED | OUTPATIENT
Start: 2018-10-16

## 2018-09-19 RX ORDER — PALONOSETRON 0.05 MG/ML
0.25 INJECTION, SOLUTION INTRAVENOUS ONCE
Status: CANCELLED | OUTPATIENT
Start: 2018-09-25

## 2018-09-19 NOTE — PROGRESS NOTES
Oral Chemotherapy Teaching      Patient Name/:  Su Pedraza   1940  Oral Chemotherapy Regimen:  Xeloda 1500mg PO BID x 14 days, followed by 7 days off + IV Oxaliplatin IV on Day 1 of a 21 day cycle    Date Started Medication:   Cycle 1: anticipate 18      Additional Notes:  Submitted script for Xeloda  to [employer] [facility] to begin processing. Pt scheduled for education in APRN chemo prep on 18. Will plan to see patient in infusion on 18 to obtain signed CCA.

## 2018-09-20 ENCOUNTER — ANESTHESIA (OUTPATIENT)
Dept: PERIOP | Facility: HOSPITAL | Age: 78
End: 2018-09-20

## 2018-09-20 ENCOUNTER — HOSPITAL ENCOUNTER (OUTPATIENT)
Facility: HOSPITAL | Age: 78
Setting detail: HOSPITAL OUTPATIENT SURGERY
Discharge: HOME | End: 2018-09-20
Attending: SURGERY | Admitting: ANESTHESIOLOGY

## 2018-09-20 ENCOUNTER — APPOINTMENT (OUTPATIENT)
Dept: GENERAL RADIOLOGY | Facility: HOSPITAL | Age: 78
End: 2018-09-20

## 2018-09-20 ENCOUNTER — OFFICE VISIT (OUTPATIENT)
Dept: ONCOLOGY | Facility: CLINIC | Age: 78
End: 2018-09-20

## 2018-09-20 VITALS
OXYGEN SATURATION: 96 % | RESPIRATION RATE: 20 BRPM | DIASTOLIC BLOOD PRESSURE: 71 MMHG | SYSTOLIC BLOOD PRESSURE: 141 MMHG | BODY MASS INDEX: 24.98 KG/M2 | HEART RATE: 58 BPM | TEMPERATURE: 97.5 F | HEIGHT: 63 IN | WEIGHT: 141 LBS

## 2018-09-20 VITALS
DIASTOLIC BLOOD PRESSURE: 86 MMHG | BODY MASS INDEX: 24.98 KG/M2 | WEIGHT: 141 LBS | RESPIRATION RATE: 12 BRPM | HEART RATE: 56 BPM | TEMPERATURE: 97.4 F | SYSTOLIC BLOOD PRESSURE: 185 MMHG | OXYGEN SATURATION: 98 % | HEIGHT: 63 IN

## 2018-09-20 DIAGNOSIS — C18.9 MALIGNANT NEOPLASM OF COLON, UNSPECIFIED PART OF COLON (HCC): Primary | ICD-10-CM

## 2018-09-20 LAB
DEPRECATED RDW RBC AUTO: 54.8 FL (ref 37–54)
ERYTHROCYTE [DISTWIDTH] IN BLOOD BY AUTOMATED COUNT: 16 % (ref 11.3–14.5)
HCT VFR BLD AUTO: 38.2 % (ref 34.5–44)
HGB BLD-MCNC: 12.4 G/DL (ref 11.5–15.5)
MCH RBC QN AUTO: 30 PG (ref 27–31)
MCHC RBC AUTO-ENTMCNC: 32.5 G/DL (ref 32–36)
MCV RBC AUTO: 92.5 FL (ref 80–99)
PLATELET # BLD AUTO: 207 10*3/MM3 (ref 150–450)
PMV BLD AUTO: 11.1 FL (ref 6–12)
POTASSIUM BLD-SCNC: 4.4 MMOL/L (ref 3.5–5.5)
RBC # BLD AUTO: 4.13 10*6/MM3 (ref 3.89–5.14)
WBC NRBC COR # BLD: 13.25 10*3/MM3 (ref 3.5–10.8)

## 2018-09-20 PROCEDURE — 25010000002 PROPOFOL 1000 MG/ML EMULSION: Performed by: NURSE ANESTHETIST, CERTIFIED REGISTERED

## 2018-09-20 PROCEDURE — 25010000002 FENTANYL CITRATE (PF) 100 MCG/2ML SOLUTION: Performed by: NURSE ANESTHETIST, CERTIFIED REGISTERED

## 2018-09-20 PROCEDURE — 85027 COMPLETE CBC AUTOMATED: CPT | Performed by: ANESTHESIOLOGY

## 2018-09-20 PROCEDURE — 25010000002 HEPARIN (PORCINE) PER 1000 UNITS: Performed by: SURGERY

## 2018-09-20 PROCEDURE — 71045 X-RAY EXAM CHEST 1 VIEW: CPT

## 2018-09-20 PROCEDURE — 25010000003 CEFAZOLIN IN DEXTROSE 2-4 GM/100ML-% SOLUTION: Performed by: NURSE ANESTHETIST, CERTIFIED REGISTERED

## 2018-09-20 PROCEDURE — 76000 FLUOROSCOPY <1 HR PHYS/QHP: CPT

## 2018-09-20 PROCEDURE — 99215 OFFICE O/P EST HI 40 MIN: CPT | Performed by: NURSE PRACTITIONER

## 2018-09-20 PROCEDURE — 25010000002 MIDAZOLAM PER 1 MG: Performed by: NURSE ANESTHETIST, CERTIFIED REGISTERED

## 2018-09-20 PROCEDURE — C1788 PORT, INDWELLING, IMP: HCPCS | Performed by: SURGERY

## 2018-09-20 PROCEDURE — 84132 ASSAY OF SERUM POTASSIUM: CPT | Performed by: SURGERY

## 2018-09-20 DEVICE — POWERPORT CLEARVUE ISP IMPLANTABLE PORT WITH ATTACHABLE 8 F CHRONOFLEX SILK OPEN-ENDED SINGLE-LUMEN VENOUS CATHETER
Type: IMPLANTABLE DEVICE | Status: NON-FUNCTIONAL
Brand: POWERPORT CLEARVUE, CHRONOFLEX SILK

## 2018-09-20 RX ORDER — CEFAZOLIN SODIUM 2 G/100ML
INJECTION, SOLUTION INTRAVENOUS AS NEEDED
Status: DISCONTINUED | OUTPATIENT
Start: 2018-09-20 | End: 2018-09-20 | Stop reason: SURG

## 2018-09-20 RX ORDER — HYDRALAZINE HYDROCHLORIDE 20 MG/ML
5 INJECTION INTRAMUSCULAR; INTRAVENOUS
Status: DISCONTINUED | OUTPATIENT
Start: 2018-09-20 | End: 2018-09-20 | Stop reason: HOSPADM

## 2018-09-20 RX ORDER — SODIUM CHLORIDE 0.9 % (FLUSH) 0.9 %
1-10 SYRINGE (ML) INJECTION AS NEEDED
Status: DISCONTINUED | OUTPATIENT
Start: 2018-09-20 | End: 2018-09-20 | Stop reason: HOSPADM

## 2018-09-20 RX ORDER — LABETALOL HYDROCHLORIDE 5 MG/ML
5 INJECTION, SOLUTION INTRAVENOUS
Status: DISCONTINUED | OUTPATIENT
Start: 2018-09-20 | End: 2018-09-20 | Stop reason: HOSPADM

## 2018-09-20 RX ORDER — FENTANYL CITRATE 50 UG/ML
INJECTION, SOLUTION INTRAMUSCULAR; INTRAVENOUS AS NEEDED
Status: DISCONTINUED | OUTPATIENT
Start: 2018-09-20 | End: 2018-09-20 | Stop reason: SURG

## 2018-09-20 RX ORDER — PROMETHAZINE HYDROCHLORIDE 25 MG/ML
6.25 INJECTION, SOLUTION INTRAMUSCULAR; INTRAVENOUS ONCE AS NEEDED
Status: DISCONTINUED | OUTPATIENT
Start: 2018-09-20 | End: 2018-09-20 | Stop reason: HOSPADM

## 2018-09-20 RX ORDER — PROMETHAZINE HYDROCHLORIDE 25 MG/1
25 SUPPOSITORY RECTAL ONCE AS NEEDED
Status: DISCONTINUED | OUTPATIENT
Start: 2018-09-20 | End: 2018-09-20 | Stop reason: HOSPADM

## 2018-09-20 RX ORDER — IPRATROPIUM BROMIDE AND ALBUTEROL SULFATE 2.5; .5 MG/3ML; MG/3ML
3 SOLUTION RESPIRATORY (INHALATION) ONCE AS NEEDED
Status: DISCONTINUED | OUTPATIENT
Start: 2018-09-20 | End: 2018-09-20 | Stop reason: HOSPADM

## 2018-09-20 RX ORDER — FAMOTIDINE 10 MG/ML
20 INJECTION, SOLUTION INTRAVENOUS ONCE
Status: DISCONTINUED | OUTPATIENT
Start: 2018-09-20 | End: 2018-09-20 | Stop reason: HOSPADM

## 2018-09-20 RX ORDER — BUPIVACAINE HYDROCHLORIDE AND EPINEPHRINE 5; 5 MG/ML; UG/ML
INJECTION, SOLUTION PERINEURAL AS NEEDED
Status: DISCONTINUED | OUTPATIENT
Start: 2018-09-20 | End: 2018-09-20 | Stop reason: HOSPADM

## 2018-09-20 RX ORDER — IBUPROFEN 600 MG/1
600 TABLET ORAL EVERY 6 HOURS PRN
Status: DISCONTINUED | OUTPATIENT
Start: 2018-09-20 | End: 2018-09-20 | Stop reason: HOSPADM

## 2018-09-20 RX ORDER — HYDROCODONE BITARTRATE AND ACETAMINOPHEN 5; 325 MG/1; MG/1
1 TABLET ORAL ONCE AS NEEDED
Status: DISCONTINUED | OUTPATIENT
Start: 2018-09-20 | End: 2018-09-20 | Stop reason: HOSPADM

## 2018-09-20 RX ORDER — LIDOCAINE HYDROCHLORIDE 10 MG/ML
0.5 INJECTION, SOLUTION EPIDURAL; INFILTRATION; INTRACAUDAL; PERINEURAL ONCE AS NEEDED
Status: COMPLETED | OUTPATIENT
Start: 2018-09-20 | End: 2018-09-20

## 2018-09-20 RX ORDER — PROMETHAZINE HYDROCHLORIDE 25 MG/1
25 TABLET ORAL ONCE AS NEEDED
Status: DISCONTINUED | OUTPATIENT
Start: 2018-09-20 | End: 2018-09-20 | Stop reason: HOSPADM

## 2018-09-20 RX ORDER — ONDANSETRON 2 MG/ML
4 INJECTION INTRAMUSCULAR; INTRAVENOUS ONCE AS NEEDED
Status: DISCONTINUED | OUTPATIENT
Start: 2018-09-20 | End: 2018-09-20 | Stop reason: HOSPADM

## 2018-09-20 RX ORDER — MIDAZOLAM HYDROCHLORIDE 1 MG/ML
INJECTION INTRAMUSCULAR; INTRAVENOUS AS NEEDED
Status: DISCONTINUED | OUTPATIENT
Start: 2018-09-20 | End: 2018-09-20 | Stop reason: SURG

## 2018-09-20 RX ORDER — FAMOTIDINE 20 MG/1
20 TABLET, FILM COATED ORAL ONCE
Status: COMPLETED | OUTPATIENT
Start: 2018-09-20 | End: 2018-09-20

## 2018-09-20 RX ORDER — SODIUM CHLORIDE, SODIUM LACTATE, POTASSIUM CHLORIDE, CALCIUM CHLORIDE 600; 310; 30; 20 MG/100ML; MG/100ML; MG/100ML; MG/100ML
9 INJECTION, SOLUTION INTRAVENOUS CONTINUOUS
Status: DISCONTINUED | OUTPATIENT
Start: 2018-09-20 | End: 2018-09-20 | Stop reason: HOSPADM

## 2018-09-20 RX ADMIN — FENTANYL CITRATE 50 MCG: 50 INJECTION, SOLUTION INTRAMUSCULAR; INTRAVENOUS at 16:58

## 2018-09-20 RX ADMIN — MIDAZOLAM HYDROCHLORIDE 1 MG: 1 INJECTION, SOLUTION INTRAMUSCULAR; INTRAVENOUS at 17:00

## 2018-09-20 RX ADMIN — SODIUM CHLORIDE, POTASSIUM CHLORIDE, SODIUM LACTATE AND CALCIUM CHLORIDE 9 ML/HR: 600; 310; 30; 20 INJECTION, SOLUTION INTRAVENOUS at 14:12

## 2018-09-20 RX ADMIN — FENTANYL CITRATE 50 MCG: 50 INJECTION, SOLUTION INTRAMUSCULAR; INTRAVENOUS at 17:00

## 2018-09-20 RX ADMIN — LIDOCAINE HYDROCHLORIDE 0.2 ML: 10 INJECTION, SOLUTION EPIDURAL; INFILTRATION; INTRACAUDAL; PERINEURAL at 14:12

## 2018-09-20 RX ADMIN — FAMOTIDINE 20 MG: 20 TABLET ORAL at 14:12

## 2018-09-20 RX ADMIN — PROPOFOL 50 MCG/KG/MIN: 10 INJECTION, EMULSION INTRAVENOUS at 16:54

## 2018-09-20 RX ADMIN — MIDAZOLAM HYDROCHLORIDE 1 MG: 1 INJECTION, SOLUTION INTRAMUSCULAR; INTRAVENOUS at 16:58

## 2018-09-20 RX ADMIN — CEFAZOLIN SODIUM 2 G: 2 INJECTION, SOLUTION INTRAVENOUS at 16:58

## 2018-09-20 NOTE — ANESTHESIA POSTPROCEDURE EVALUATION
Patient: Su Pedraza    Procedure Summary     Date:  09/20/18 Room / Location:   [facility]    Anesthesia Start:  1654 Anesthesia Stop:  1731    Procedure:  PORT PLACEMENT (N/A ) Diagnosis:      Surgeon:  Dereck Hogue MD Provider:  Rodrigo Ball MD    Anesthesia Type:  general, MAC ASA Status:  2          Anesthesia Type: general, MAC  Last vitals  BP   144/78 (09/20/18 1404)   Temp   97.6 °F (36.4 °C) (09/20/18 1404)   Pulse   60 (09/20/18 1404)   Resp   16 (09/20/18 1404)     SpO2   98 % (09/20/18 1404)     Post Anesthesia Care and Evaluation    Patient location during evaluation: PACU  Patient participation: complete - patient participated  Level of consciousness: awake and alert  Pain score: 0  Pain management: adequate  Airway patency: patent  Anesthetic complications: No anesthetic complications  PONV Status: none  Cardiovascular status: hemodynamically stable and acceptable  Respiratory status: nonlabored ventilation, acceptable and nasal cannula  Hydration status: acceptable

## 2018-09-20 NOTE — PROGRESS NOTES
"CHEMOTHERAPY PREPARATION    Su Pedraza  [id number]  1940    Chief Complaint: Chemotherapy preparation visit     History of present illness:  Su Pedraza is a 78 y.o. year old female who is here today for chemotherapy preparation and needs assessment. The patient has been diagnosed with ascending colon adenocarcinoma T3 N1 M0 stage IIIa and is scheduled to begin oral and IV treatment with Xeloda plus oxaliplatin.     Oncology History:     No history exists.       Past Medical History:   Diagnosis Date   • Arthritis     hands and legs    • Cancer (CMS/HCC)     colon cancer, uterine cancer   • Full dentures    • Hypertension        Past Surgical History:   Procedure Laterality Date   • APPENDECTOMY     • CARPAL TUNNEL RELEASE      bilat    • CATARACT EXTRACTION Bilateral    • CHOLECYSTECTOMY     • COLON RESECTION Right 8/1/2018    Procedure: extended right hemicolectomy with end bloc node dissection;  Surgeon: Clovis Juarez MD;  Location: [facility];  Service: General   • COLONOSCOPY     • ENDOSCOPY     • HYSTERECTOMY      partial still have both ovaries    • TONSILLECTOMY AND ADENOIDECTOMY     • WISDOM TOOTH EXTRACTION         MEDICATIONS: The current medication list was reviewed and reconciled.     Allergies:  is allergic to codeine sulfate.    No family history on file.      Review of Systems    Physical Exam  Vital Signs: BP (!) 185/86   Pulse 56   Temp 97.4 °F (36.3 °C) (Temporal Artery )   Resp 12   Ht 160 cm (62.99\")   Wt 64 kg (141 lb)   SpO2 98%   BMI 24.98 kg/m²    General Appearance:  alert, cooperative, no apparent distress and appears stated age   Neurologic/Psychiatric: A&O x 3, gait steady, appropriate affect   HEENT:  Normocephalic, without obvious abnormality, mucous membranes moist   Lungs:   Clear to auscultation bilaterally; respirations regular, even, and unlabored bilaterally   Heart:  Regular rate and rhythm, no murmurs appreciated   Extremities: Normal, atraumatic; no " clubbing, cyanosis, or edema    Skin: No rashes, lesions, or abnormal coloration noted     ECOG Performance Status: (0) Fully active, able to carry on all predisease performance without restriction          NEEDS ASSESSMENTS    Genetics  The patient's new diagnosis and family history have been reviewed for genetic counseling needs. A genetic referral is not recommended.     Psychosocial  The patient has completed a PHQ-9 Depression Screening and the Distress Thermometer (DT) today.   PHQ-9 results show 0 (No Depression). The patient scored their distress today as 2 on a scale of 0-10 with 0 being no distress and 10 being extreme distress.   Problems marked as being an issue for her within the last week include emotional problems and physical problems.   Results were reviewed along with psychosocial resources offered by our cancer center. Our oncology social worker will be flagged for a DT score of 4 or above, and a same day call will be made for a score of 9 or 10. A mental health referral is not recommended at this time. The patient is not accepting of a referral to [facility].   Copies of patient's questionnaires will be scanned into EMR for details and further reference.    Barriers to care  A barriers form was also completed by the patient today. We discussed services offered by our facility to help her have adequate access to care. The patient was given the name and card for our Oncology Social Worker, Tracie Jones. Based upon barriers assessment today, the patient will not require a follow-up call from the  to further discuss needs.   A copy of the barriers form will also be scanned into EMR for details and further reference.     VAD Assessment  The patient and I discussed planned intervenous chemotherapy as well as other IV treatments that are often needed throughout the course of treatment. These may include, but are not limited to blood transfusions, antibiotics, and IV hydration. The  "patient's vasculature does not appear to be adequate for multiple peripheral IVs throughout their treatment course. Discussed risks and benefits of VADs. The patient is having a Port-A-Cath insertion prior to initiation of treatment.     Advanced Care Planning  The patient and I discussed advanced care planning, \"Conversations that Matter\".   This service was offered, free of charge, for development of advance directives with a certified ACP facilitator.  The patient does not have an up-to-date advanced directive. This document is not on file with our office. The patient is not interested in an appointment with one of our facilitators to create or update their advanced directives.      Palliative Care  The patient and I discussed palliative care services. Palliative care is not the same as Hospice care. This is specialized medical care for people living with serious illness with the goal of improving quality of life for the patient and their family. [facility] has partnered with [facility] to offer our patients outpatient palliative care early along with their treatment to assist in coordination of care, symptom management, pain management, and medical decision making.  Oncology criteria for palliative care referral is not met at this time. The patient is not interested in a palliative care consultation.     Additional Referral needs  none      IV CHEMOTHERAPY EDUCATION    Booklets Given: Chemotherapy and You [x]  Eating Hints [x]    Sexuality/Fertility Books []      Chemotherapy/Biotherapy Education Sheets: (list all that apply)  nausea management, acid reflux management, diarrhea management, Cancer resourse contacts information, skin and mouth care and vaccination information                                                                                                                                                                 Chemotherapy Regimen:   Treatment Plans     Name Type Plan dates Plan " Provider         Active    OP COLON [facility] Capecitabine / OXALIplatin ONCOLOGY TREATMENT  9/17/2018 - Present Schuyler Coleman MD                    TOPICS EDUCATION PROVIDED COMMENTS   ANEMIA:  role of RBC, cause, s/s, ways to manage, role of transfusion [x]    THROMBOCYTOPENIA:  role of platelet, cause, s/s, ways to prevent bleeding, things to avoid, when to seek help [x]    NEUTROPENIA:  role of WBC, cause, infection precautions, s/s of infection, when to call MD [x]    NUTRITION & APPETITE CHANGES:  importance of maintaining healthy diet & weight, ways to manage to improve intake, dietary consult, exercise regimen [x]    DIARRHEA:  causes, s/s of dehydration, ways to manage, dietary changes, when to call MD [x]    CONSTIPATION:  causes, ways to manage, dietary changes, when to call MD [x]    NAUSEA & VOMITING:  cause, use of antiemetics, dietary changes, when to call MD [x]    MOUTH SORES:  causes, oral care, ways to manage [x]    ALOPECIA:  cause, ways to manage, resources [x]    INFERTILITY & SEXUALITY:  causes, fertility preservation options, sexuality changes, ways to manage, importance of birth control [x]    NERVOUS SYSTEM CHANGES:  causes, s/s, neuropathies, cognitive changes, ways to manage [x]    PAIN:  causes, ways to manage [x] ????   SKIN & NAIL CHANGES:  cause, s/s, ways to manage [x]    ORGAN TOXICITIES:  cause, s/s, need for diagnostic tests, labs, when to notify MD [x]    SURVIVORSHIP:  distress, distress assessment, secondary malignancies, early/late effects, follow-up, social issues, social support [x]    HOME CARE:  use of spill kits, storing of PO chemo, how to manage bodily fluids [x]    MISCELLANEOUS:  drug interactions, administration, vesicant, et [x]                ORAL CHEMOTHERAPY TEACHING    Oral Chemotherapy Regimen: Xeloda 1500 mg po BID days 1-14, off for 7 days.     Date of Medication Start: 9/25/2018    Initial Teaching  [x] Comments   Safety    Storage instructions (away from  children; away from heat/cold, sunlight, or moisture), handling - use of gloves (caregivers), washing hands after touching pills, managing waste    Adherence     patient and/or caregiver on how to take medications, take with/without food, assess their adherence potential, stress importance of adherence, ways to manage adherence (pill boxes, phone reminders, calendars), what to do if a dose is missed    Side Effects/Adverse Reactions     patient of potential side effects, s/s, ways to manage, when to call MD or seek help    Miscellaneous    Food interactions, DDIs, financial issues      Additional chemotherapy notes:        Assessment and Plan:    Diagnoses and all orders for this visit:    Malignant neoplasm of colon, unspecified part of colon (CMS/HCC)        This was a 55 minute face-to-face visit with 50 minutes spent in  counseling and coordination of care as documented above.   The patient and I have reviewed their new cancer diagnosis and scheduled treatment plan. Needs assessment was completed including genetics, psychosocial needs, barriers to care, VAD evaluation, advanced care planning, and palliative care services. Referrals have been ordered as appropriate based upon our evaluation and patient desires.     IV and oral chemotherapy teaching was also completed today as documented above. Adequate time was given to answer all questions to her satisfaction. Patient and family are aware of their care team members and contact information if they have questions or problems throughout the treatment course. Needs assessments and education has been completed. The patient is adequately prepared to begin treatment as scheduled.       Tracy Caldwell, [facility]

## 2018-09-20 NOTE — H&P
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   S/P right hemicolectomy 8/1/18 with Dr. Juarez.  Plans to initiate Xeloda plus oxaliplatin and here today for port placement    Review of Systems:  General ROS: negative for chills, fever or skin lesions;  No changes since last office visit  Cardiovascular ROS: no chest pain or dyspnea on exertion  Respiratory ROS: no cough, shortness of breath, or wheezing    Allergies:   Allergies   Allergen Reactions   • Codeine Sulfate Nausea And Vomiting   • Adhesive Tape Rash       Home Meds:    No current facility-administered medications on file prior to encounter.      Current Outpatient Prescriptions on File Prior to Encounter   Medication Sig Dispense Refill   • atenolol (TENORMIN) 50 MG tablet Take 50 mg by mouth Daily.     • raNITIdine (ZANTAC) 150 MG tablet Take 150 mg by mouth As Needed for Heartburn.     • lidocaine-prilocaine (EMLA) 2.5-2.5 % cream Apply  topically to the appropriate area as directed As Needed (45-60 minutes prior to port access.  Cover with saran/plastic wrap.). 30 g 3       PMH:   Past Medical History:   Diagnosis Date   • Arthritis     hands and legs    • Cancer (CMS/HCC)     colon cancer, uterine cancer   • Full dentures    • Hypertension      PSH:    Past Surgical History:   Procedure Laterality Date   • APPENDECTOMY     • CARPAL TUNNEL RELEASE      bilat    • CATARACT EXTRACTION Bilateral    • CHOLECYSTECTOMY     • COLON RESECTION Right 8/1/2018    Procedure: extended right hemicolectomy with end bloc node dissection;  Surgeon: Clovis Juarez MD;  Location: [facility];  Service: General   • COLONOSCOPY     • ENDOSCOPY     • HYSTERECTOMY      partial still have both ovaries    • TONSILLECTOMY AND ADENOIDECTOMY     • WISDOM TOOTH  "EXTRACTION       Social History:   Tobacco:   History   Smoking Status   • Never Smoker   Smokeless Tobacco   • Never Used      Alcohol:     History   Alcohol Use No       Vitals:           /78 (BP Location: Right arm, Patient Position: Lying)   Pulse 60   Temp 97.6 °F (36.4 °C) (Temporal Artery )   Resp 16   Ht 160 cm (62.99\")   Wt 64 kg (141 lb)   SpO2 98%   BMI 24.98 kg/m²     Physical Exam:  General Appearance:    Alert, cooperative, no distress, appears stated age   Head:    Normocephalic, without obvious abnormality, atraumatic   Lungs:     Clear to auscultation bilaterally, respirations unlabored    Heart:   Regular rate and rhythm, S1 and S2 normal, no murmur, rub    or gallop    Abdomen:    Soft, non-tender.  +bowel sounds.  Abd incision healing well   Breast Exam:    deferred   Genitalia:    deferred   Extremities:   Extremities normal, atraumatic, no cyanosis or edema   Skin:   Skin color, texture, turgor normal, no rashes or lesions   Neurologic:   Grossly intact   Results Review  I reviewed the patient's new clinical results.    Impression: Ascending colon adenocarcinoma T3 N1 M0 stage IIIa with metastatic adenocarcinoma to 1 of 25 lymph nodes, plans for initiation of Xeloda plus oxaliplatin    Plan: Port placement    Nisa Villareal, APRN   9/20/2018   2:09 PM  "

## 2018-09-20 NOTE — ANESTHESIA PREPROCEDURE EVALUATION
Anesthesia Evaluation     Patient summary reviewed and Nursing notes reviewed   NPO Solid Status: > 8 hours  NPO Liquid Status: > 8 hours           Airway   Mallampati: I  TM distance: >3 FB  Neck ROM: full  No difficulty expected  Dental - normal exam     Pulmonary    Cardiovascular     ECG reviewed    (+) hypertension,       Neuro/Psych  GI/Hepatic/Renal/Endo      Musculoskeletal     Abdominal    Substance History      OB/GYN          Other      history of cancer active                    Anesthesia Plan    ASA 2     general and MAC     intravenous induction   Anesthetic plan, all risks, benefits, and alternatives have been provided, discussed and informed consent has been obtained with: patient.    Plan discussed with CRNA.

## 2018-09-20 NOTE — BRIEF OP NOTE
Progress Note    Su Pedraza  9/20/2018    Pre-op Diagnosis:   Colon cancer       Post-Op Diagnosis Codes:     * Colon cancer (CMS/HCC) [C18.9]    Procedure/CPT® Codes:  NH INSJ TUNNELED CTR VAD W/SUBQ PORT AGE 5 YR/> [29113]    * No procedures listed *    * No surgeons listed *    Anesthesia: * No anesthesia type entered *    Staff:   * No surgical staff found *    Estimated Blood Loss: 10 mL    Urine Voided: * No values recorded between 9/20/2018 12:00 AM and 9/20/2018  5:37 PM *    Specimens:                None      Drains:      Findings: Fluoroscopy guided access  Modified Seldinger technique  Confirmed appropriate placement and function at completion    Complications: None      Dereck Hogue MD     Date: 9/20/2018  Time: 5:37 PM

## 2018-09-20 NOTE — OP NOTE
Procedure Report    Patient Name:  Su Pedraza  YOB: 1940    Date of Surgery:  09/20/18 5:37 PM     Indications:  This patient was diagnosed with stage III colon cancer in need of durable central venous access for chemotherapy.  She was explained at length risks and benefits of port placement.  She breast understanding was anxious to proceed    Pre-op Diagnosis: Colon cancer     Post-op Diagnosis: Colon cancer    Procedure:  Left subclavian PowerPort placement  Direct fluoroscopy      Surgeon: Dereck Hogue MD    Staff:  * No surgeons listed *         Anesthesia: Monitored anesthesia care with sedation    Estimated Blood Loss: 10 mL    Implants:    Nothing was implanted during the procedure    Specimen:          None      Findings: Left subclavian approach  Modified Seldinger technique under fluoroscopic guidance  Confirmed ability to flush port and withdrawal venous blood at completion    Complications: None immediate    Description of Procedure: After attaining informed consent from the patient she is brought the operative suite where she underwent monitored anesthesia care with sedation.  Bilateral neck and chest were prepped and draped in sterile fashion.  Sequential compression devices were placed prior to surgery.  Patients are for [facility] as well.  Intravenous antibiotic she given prior to incision in her name and planned procedure verified prior to incision.    Local anesthesia was infused the soft tissues left chest and neck.  A transverse incision was made.  An introducer needle was placed in the subclavian vein by guidewire placed the right heart system with its position verified with fluoroscopy.  A subcutaneous pocket was developed Bovie cautery.  2 stay sutures of Prolene were placed the 10 and 2:00 positions and secured to the port.  The catheter was measured to appropriate length with fluoroscopy.  Then using constant fluoroscopic guidance a modified Seldinger technique  an introducer sheath was placed over the wire into the right heart system.  The wire was removed and catheter placed through the sheath.  Sheath was removed and fluoroscopy verified appropriate position with no kinking the port was accessed was easily withdrawal venous blood and flushed heparinized saline therefore the 2 Prolene sutures were secured.  Incision was closed with 2 layers observable suture and Dermabond.  The patient was laterally from anesthesia without competition.  There are no, occasions during the case.  All counts are correct in the case.  I performed the entire procedure      Dereck Hogue MD     Date: 9/20/2018  Time: 5:37 PM

## 2018-09-21 ENCOUNTER — TELEPHONE (OUTPATIENT)
Dept: ONCOLOGY | Facility: CLINIC | Age: 78
End: 2018-09-21

## 2018-09-21 NOTE — TELEPHONE ENCOUNTER
----- Message from Derek Aleman sent at 9/21/2018  9:33 AM EDT -----  Regarding: BAND[facility]ENN  Contact: [phone]  Patient's niece, Ms. Freeman, called to speak to Sheryl. She stated that the patient, her aunt, is seeing her doctor today at 10:30. Ms. Freeman explained that the patient is supposed to get her flu shot and shingle shot today, but that she needed approval from Dr. Coleman to proceed since she starts chemo treatment next Tuesday.     Ms. Freeman can be reached at [phone].    Thanks!

## 2018-09-21 NOTE — TELEPHONE ENCOUNTER
VM left advising okay to get flu shot today, however to wait for shingles shot until after completion of treatment

## 2018-09-25 ENCOUNTER — HOSPITAL ENCOUNTER (OUTPATIENT)
Dept: CT IMAGING | Facility: HOSPITAL | Age: 78
Discharge: HOME | End: 2018-09-25
Attending: INTERNAL MEDICINE

## 2018-09-25 ENCOUNTER — INFUSION (OUTPATIENT)
Dept: ONCOLOGY | Facility: HOSPITAL | Age: 78
End: 2018-09-25

## 2018-09-25 ENCOUNTER — SPECIALTY PHARMACY (OUTPATIENT)
Dept: ONCOLOGY | Facility: HOSPITAL | Age: 78
End: 2018-09-25

## 2018-09-25 ENCOUNTER — OFFICE VISIT (OUTPATIENT)
Dept: ONCOLOGY | Facility: CLINIC | Age: 78
End: 2018-09-25

## 2018-09-25 ENCOUNTER — DOCUMENTATION (OUTPATIENT)
Dept: NUTRITION | Facility: HOSPITAL | Age: 78
End: 2018-09-25

## 2018-09-25 ENCOUNTER — EDUCATION (OUTPATIENT)
Dept: ONCOLOGY | Facility: HOSPITAL | Age: 78
End: 2018-09-25

## 2018-09-25 VITALS
BODY MASS INDEX: 24.77 KG/M2 | HEART RATE: 52 BPM | DIASTOLIC BLOOD PRESSURE: 75 MMHG | HEIGHT: 63 IN | TEMPERATURE: 98.2 F | WEIGHT: 139.8 LBS | SYSTOLIC BLOOD PRESSURE: 170 MMHG | OXYGEN SATURATION: 97 % | RESPIRATION RATE: 18 BRPM

## 2018-09-25 DIAGNOSIS — C18.9 MALIGNANT NEOPLASM OF COLON, UNSPECIFIED PART OF COLON (HCC): ICD-10-CM

## 2018-09-25 DIAGNOSIS — C18.9 MALIGNANT NEOPLASM OF COLON, UNSPECIFIED PART OF COLON (HCC): Primary | ICD-10-CM

## 2018-09-25 LAB
ALBUMIN SERPL-MCNC: 4.15 G/DL (ref 3.2–4.8)
ALBUMIN/GLOB SERPL: 1.3 G/DL (ref 1.5–2.5)
ALP SERPL-CCNC: 125 U/L (ref 25–100)
ALT SERPL W P-5'-P-CCNC: 37 U/L (ref 7–40)
ANION GAP SERPL CALCULATED.3IONS-SCNC: 12 MMOL/L (ref 3–11)
AST SERPL-CCNC: 41 U/L (ref 0–33)
BILIRUB SERPL-MCNC: 0.4 MG/DL (ref 0.3–1.2)
BUN BLD-MCNC: 10 MG/DL (ref 9–23)
BUN/CREAT SERPL: 12.7 (ref 7–25)
CALCIUM SPEC-SCNC: 9.1 MG/DL (ref 8.7–10.4)
CHLORIDE SERPL-SCNC: 100 MMOL/L (ref 99–109)
CO2 SERPL-SCNC: 30 MMOL/L (ref 20–31)
CREAT BLD-MCNC: 0.79 MG/DL (ref 0.6–1.3)
CREAT BLDA-MCNC: 0.8 MG/DL (ref 0.6–1.3)
ERYTHROCYTE [DISTWIDTH] IN BLOOD BY AUTOMATED COUNT: 17.5 % (ref 11.3–14.5)
GFR SERPL CREATININE-BSD FRML MDRD: 70 ML/MIN/1.73
GLOBULIN UR ELPH-MCNC: 3.3 GM/DL
GLUCOSE BLD-MCNC: 84 MG/DL (ref 70–100)
HCT VFR BLD AUTO: 34.9 % (ref 34.5–44)
HGB BLD-MCNC: 11.4 G/DL (ref 11.5–15.5)
LYMPHOCYTES # BLD AUTO: 6.1 10*3/MM3 (ref 0.6–4.8)
LYMPHOCYTES NFR BLD AUTO: 49.5 % (ref 24–44)
MCH RBC QN AUTO: 29.9 PG (ref 27–31)
MCHC RBC AUTO-ENTMCNC: 32.8 G/DL (ref 32–36)
MCV RBC AUTO: 91.2 FL (ref 80–99)
MONOCYTES # BLD AUTO: 1 10*3/MM3 (ref 0–1)
MONOCYTES NFR BLD AUTO: 7.8 % (ref 0–12)
NEUTROPHILS # BLD AUTO: 5.3 10*3/MM3 (ref 1.5–8.3)
NEUTROPHILS NFR BLD AUTO: 42.7 % (ref 41–71)
PLATELET # BLD AUTO: 186 10*3/MM3 (ref 150–450)
PMV BLD AUTO: 9 FL (ref 6–12)
POTASSIUM BLD-SCNC: 4.2 MMOL/L (ref 3.5–5.5)
PROT SERPL-MCNC: 7.4 G/DL (ref 5.7–8.2)
RBC # BLD AUTO: 3.83 10*6/MM3 (ref 3.89–5.14)
SODIUM BLD-SCNC: 142 MMOL/L (ref 132–146)
WBC NRBC COR # BLD: 12.4 10*3/MM3 (ref 3.5–10.8)

## 2018-09-25 PROCEDURE — 25010000002 OXALIPLATIN PER 0.5 MG: Performed by: INTERNAL MEDICINE

## 2018-09-25 PROCEDURE — 96375 TX/PRO/DX INJ NEW DRUG ADDON: CPT

## 2018-09-25 PROCEDURE — 99214 OFFICE O/P EST MOD 30 MIN: CPT | Performed by: INTERNAL MEDICINE

## 2018-09-25 PROCEDURE — 85025 COMPLETE CBC W/AUTO DIFF WBC: CPT

## 2018-09-25 PROCEDURE — 80053 COMPREHEN METABOLIC PANEL: CPT

## 2018-09-25 PROCEDURE — 82565 ASSAY OF CREATININE: CPT

## 2018-09-25 PROCEDURE — 96415 CHEMO IV INFUSION ADDL HR: CPT

## 2018-09-25 PROCEDURE — 25010000002 PALONOSETRON PER 25 MCG: Performed by: INTERNAL MEDICINE

## 2018-09-25 PROCEDURE — 96413 CHEMO IV INFUSION 1 HR: CPT

## 2018-09-25 PROCEDURE — 25010000002 DEXAMETHASONE PER 1 MG: Performed by: INTERNAL MEDICINE

## 2018-09-25 PROCEDURE — 25010000002 IOPAMIDOL 61 % SOLUTION: Performed by: INTERNAL MEDICINE

## 2018-09-25 PROCEDURE — 71260 CT THORAX DX C+: CPT

## 2018-09-25 RX ORDER — DEXTROSE MONOHYDRATE 50 MG/ML
250 INJECTION, SOLUTION INTRAVENOUS ONCE
Status: COMPLETED | OUTPATIENT
Start: 2018-09-25 | End: 2018-09-25

## 2018-09-25 RX ORDER — PALONOSETRON 0.05 MG/ML
0.25 INJECTION, SOLUTION INTRAVENOUS ONCE
Status: COMPLETED | OUTPATIENT
Start: 2018-09-25 | End: 2018-09-25

## 2018-09-25 RX ORDER — SODIUM CHLORIDE 0.9 % (FLUSH) 0.9 %
10 SYRINGE (ML) INJECTION AS NEEDED
Status: CANCELLED | OUTPATIENT
Start: 2018-09-25

## 2018-09-25 RX ADMIN — DEXAMETHASONE SODIUM PHOSPHATE 12 MG: 4 INJECTION, SOLUTION INTRAMUSCULAR; INTRAVENOUS at 14:42

## 2018-09-25 RX ADMIN — DEXTROSE MONOHYDRATE 250 ML: 50 INJECTION, SOLUTION INTRAVENOUS at 15:00

## 2018-09-25 RX ADMIN — IOPAMIDOL 60 ML: 612 INJECTION, SOLUTION INTRAVENOUS at 12:05

## 2018-09-25 RX ADMIN — PALONOSETRON HYDROCHLORIDE 0.25 MG: 0.25 INJECTION INTRAVENOUS at 14:42

## 2018-09-25 RX ADMIN — OXALIPLATIN 150 MG: 100 INJECTION, SOLUTION, CONCENTRATE INTRAVENOUS at 15:00

## 2018-09-25 NOTE — PROGRESS NOTES
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     HISTORY OF PRESENT ILLNESS: The patient is a very pleasant 78 y.o. female  with past medical history significant for colon cancer diagnosed 07/04/18. The patient presented to [facility] for gastritis and right lower quadrant pain.  This has been going on for the last few months associated with nausea but no vomiting.  She's been having one of dark stools.  Never had this problem before.  She had previous negative colonoscopy.  Colon polyp was found and biopsied. Surgical pathology 07/04/18 revealed invasive poorly differentiated adenocarcinoma in ascending colon. CT abdomen and pelvis 07/06/18 showed mass within the ascending colon with large mercy mass in the adjacent mesentery and prominent nodes around the cecum. The patient presented to Dr Juarez for elective right hemicolectomy on 08/01/18. Pathology 08/01/18 from colon and terminal ileum resection revealed poorly differentiated colonic adenocarcinoma ([id number]) with metastatic adenocarcinoma to 1 of 25 lymph nodes.  She was started on adjuvant chemotherapy using oxaliplatin with Xeloda September 25, 2018.  The patient is here today for follow up visit with treatment cycle #1.        SUBJECTIVE: The patient has been doing fairly well since her last visit. She has no complaints today. She denies headaches, diarrhea and vomiting.     PAST MEDICAL HISTORY/SOCIAL HISTORY/FAMILY HISTORY: Reviewed by me and unchanged from my documentation done on 09/17/18.    Review of Systems   Constitutional: Negative for activity change, appetite change, chills, fatigue, fever and unexpected weight change.   HENT: Negative for hearing loss, mouth sores, nosebleeds, sore throat and trouble swallowing.    Eyes: Negative for visual disturbance.   Respiratory: Negative for cough, chest tightness, shortness of breath and wheezing.   "  Cardiovascular: Negative for chest pain, palpitations and leg swelling.   Gastrointestinal: Negative for abdominal distention, abdominal pain, blood in stool, constipation, diarrhea, nausea, rectal pain and vomiting.   Endocrine: Negative for cold intolerance and heat intolerance.   Genitourinary: Negative for difficulty urinating, dysuria, frequency and urgency.   Musculoskeletal: Negative for arthralgias, back pain, gait problem, joint swelling and myalgias.   Skin: Negative for rash.   Neurological: Negative for dizziness, tremors, syncope, weakness, light-headedness, numbness and headaches.   Hematological: Negative for adenopathy. Does not bruise/bleed easily.   Psychiatric/Behavioral: Negative for confusion, sleep disturbance and suicidal ideas. The patient is not nervous/anxious.          Current Outpatient Prescriptions:   •  atenolol (TENORMIN) 50 MG tablet, Take 50 mg by mouth Daily., Disp: , Rfl:   •  capecitabine (XELODA) 500 MG chemo tablet, Take 3 tab(s) by mouth in the AM and 3 tab(s) by mouth in the PM on days 1-14, then off for 7 days., Disp: 84 tablet, Rfl: 3  •  lidocaine-prilocaine (EMLA) 2.5-2.5 % cream, Apply  topically to the appropriate area as directed As Needed (45-60 minutes prior to port access.  Cover with saran/plastic wrap.)., Disp: 30 g, Rfl: 3  •  raNITIdine (ZANTAC) 150 MG tablet, Take 150 mg by mouth As Needed for Heartburn., Disp: , Rfl:   No current facility-administered medications for this visit.     PHYSICAL EXAMINATION:   /75   Pulse 52   Temp 98.2 °F (36.8 °C) (Temporal Artery )   Resp 18   Ht 160 cm (62.99\")   Wt 63.4 kg (139 lb 12.8 oz)   SpO2 97% Comment: RA  BMI 24.77 kg/m²    ECOG Performance Status: 1 - Symptomatic but completely ambulatory  General Appearance:  alert, cooperative, no apparent distress and appears stated age   Neurologic/Psychiatric: A&O x 3, gait steady, appropriate affect, strength 5/5 in all muscle groups   HEENT:  Normocephalic, " without obvious abnormality, mucous membranes moist   Neck: Supple, symmetrical, trachea midline, no adenopathy;  No thyromegaly, masses, or tenderness   Lungs:   Clear to auscultation bilaterally; respirations regular, even, and unlabored bilaterally   Heart:  Regular rate and rhythm, no murmurs appreciated   Abdomen:   Soft, non-tender, non-distended and no organomegaly   Lymph nodes: No cervical, supraclavicular, inguinal or axillary adenopathy noted   Extremities: Normal, atraumatic; no clubbing, cyanosis, or edema    Skin: No rashes, ulcers, or suspicious lesions noted     Admission on 09/20/2018, Discharged on 09/20/2018   Component Date Value Ref Range Status   • WBC 09/20/2018 13.25* 3.50 - 10.80 10*3/mm3 Final   • RBC 09/20/2018 4.13  3.89 - 5.14 10*6/mm3 Final   • Hemoglobin 09/20/2018 12.4  11.5 - 15.5 g/dL Final   • Hematocrit 09/20/2018 38.2  34.5 - 44.0 % Final   • MCV 09/20/2018 92.5  80.0 - 99.0 fL Final   • MCH 09/20/2018 30.0  27.0 - 31.0 pg Final   • MCHC 09/20/2018 32.5  32.0 - 36.0 g/dL Final   • RDW 09/20/2018 16.0* 11.3 - 14.5 % Final   • RDW-SD 09/20/2018 54.8* 37.0 - 54.0 fl Final   • MPV 09/20/2018 11.1  6.0 - 12.0 fL Final   • Platelets 09/20/2018 207  150 - 450 10*3/mm3 Final   • Potassium 09/20/2018 4.4  3.5 - 5.5 mmol/L Final        Ct Chest With Contrast    Result Date: 9/25/2018  Narrative: EXAMINATION: CT CHEST W CONTRAST-  INDICATION: Followup scan; C18.9-Malignant neoplasm of colon, unspecified.  TECHNIQUE:  Axial CT data of the chest were acquired helically with contrast.  Multiplanar reformatted images were generated and reviewed. The radiation dose reduction device was turned on for each scan per the ALARA (As Low as Reasonably Achievable) protocol  COMPARISONS:  Outside facility CT abdomen/pelvis 07/06/2018.  FINDINGS: Patent central airways. Mild cardiomegaly. Three-vessel coronary artery disease. Trace pericardial effusion. No enlarged thoracic lymph node within the axillae,  mediastinum or ana. No noncalcified lung nodule or mass. Scattered evidence of old granulomatous disease in the lingula. No pleural effusion or pneumothorax. Incidental imaging of the upper abdomen reveals cholecystectomy clips and postcholecystectomy biliary ectasia; however, in the right lobe of the liver (axial series image #48 and 57) there are a couple of hypodense lesions that were not definitely present on the comparison scan from July 2018.      Impression: 1. No evidence of lung metastases. 2. Apparently new right lobe liver lesions require further characterization. This can be performed with MRI or CT liver mass protocol.  D:  09/25/2018 E:  09/25/2018      Xr Chest 1 View    Result Date: 9/20/2018  Narrative: EXAM:  XR Chest, 1 View EXAM DATE/TIME:  9/20/2018 5:44 PM CLINICAL HISTORY:  78 years old, female; Device placement; Other: Port; Prior surgery; Surgery date: Post-operative (0-2 days); Additional info: Colon cancer, port placement TECHNIQUE:  XR of the chest, 1 view. COMPARISON:  CR XR CHEST 2 VW 7/26/2018 2:00 PM FINDINGS:  Tubes, catheters and devices:  Port-A-Cath on the left subclavian catheter tip at the upper SVC.  Lungs:  There is no focal pulmonary consolidation.  Pleural space:  There is no pleural effusion or pneumothorax.  Heart/Mediastinum:  The cardiac silhouette is prominent, which in part may be related to portable technique. Atherosclerotic calcifications are noted in the aortic knob.  Bones/joints:  Degenerative changes at the shoulders bilaterally.     Impression: No acute cardiopulmonary disease. THIS DOCUMENT HAS BEEN ELECTRONICALLY SIGNED BY SAMRA PARRY MD    Fl C Arm During Surgery    Result Date: 9/21/2018  Narrative: EXAMINATION: FL C-ARM DURING SURGERY-09/20/2018:  INDICATION: Port placement.  TECHNIQUE: Intraoperative fluoroscopy for improved localization and treatment planning.  COMPARISON: Chest x-ray 07/26/2018.  FINDINGS: Intraoperative fluoroscopy with total  fluoroscopic time usage 6 seconds and 2 representative images saved during left chest wall Port-A-Cath placement. The catheter terminates at the cavoatrial junction.      Impression: Intraoperative fluoroscopy utilized during left chest wall Port-A-Cath placement.  D:  09/21/2018 E:  09/21/2018     This report was finalized on 9/21/2018 8:43 AM by Dr. Josafat Brown.      Ct Outside Films    Result Date: 8/29/2018  Narrative: This procedure was auto-finalized with no dictation required.      ASSESSMENT: The patient is a very pleasant 78 y.o. 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  Ascending colon adenocarcinoma T3 N1 M0 stage IIIa:  A.  Presenting with abdominal pain and irregular bowel movements  B.  Diagnosed after colonoscopy with a biopsy done on June 27, 2018  C.  Status post right hemicolectomy done by Dr. Wong August 01, 2018  D.  Final pathology revealed with 1 out of 25 positive mesenteric lymph nodes  E. Started adjuvant chemotherapy using Xeloda with oxaliplatin September 25, 2018  E. CEA normal at diagnosis  2.  Hypertension  3.  Normocytic anemia  4.  Two hypodense right hepatic lobe liver lesions.     PLAN:   1. I will proceed with treatment as scheduled today Xeloda with oxaliplatin cycle #1.    2.  The patient will follow-up with me in 3 weeks for cycle #2 out of 8 planned.  3.  I will monitor patient blood work including blood counts kidney function liver enzymes and electrolytes.  4.  I will repeat patient's scans in 3 months to follow-up on 2 small right hepatic lobe liver lesions.  5.  I did go over potential side effects of treatment including diarrhea and skin rash as well as peripheral neuropathy.  I will set her up for premedication with my nurse practitioner.  6.  I will start the patient on Zofran as needed for chemotherapy induced nausea.  7.  We will monitor patient blood pressure while she is on active chemotherapy.  I  might have to hold her antihypertensive agent.  8.  I explained to the patient there is no value repeating her CEA level since it was normal preoperatively.  9.  We'll continue port care.  Schuyler Coleman MD  9/25/2018

## 2018-09-25 NOTE — PROGRESS NOTES
Oral Chemotherapy Teaching      Patient Name/:  Su Pedraza   1940  Oral Chemotherapy Regimen:  Xeloda 1500mg PO BID x 14 days, followed by 7 days off + IV Oxaliplatin IV on Day 1 of a 21 day cycle    Date Started Medication:   Cycle 1:  18      Additional Notes:  Pt seen in APRN clinic for education. Pt presented today to infusion center for Cycle 1 of [facility]. Provided patient with written calendar. Reports medication on hand and plans to start Xeloda this afternoon. Reports higher copay with Cycle 1. Alerted Financial Navigator to assist if any funding available. Pt voiced appreciation.  Obtained signed CCA.

## 2018-09-27 NOTE — PROGRESS NOTES
Oncology Nutrition Screening    Patient Name:  Su Pedraza  YOB: 1940  MRN: [id number]  Date:  09/27/18  Physician:  Dr. Coleman    Type of Cancer Treatment:   Surgery:   Right hemicolectomy (8/1/18)  Chemotherapy:  Xeloda - po bid days 1-14 / Oxaliplatin IV day 1 - every 21 days    Patient Active Problem List   Diagnosis   • Colon cancer (CMS/HCC)   • Essential hypertension   • Arthritis   • Malignant neoplasm of colon (CMS/HCC)       Current Outpatient Prescriptions   Medication Sig Dispense Refill   • atenolol (TENORMIN) 50 MG tablet Take 50 mg by mouth Daily.     • capecitabine (XELODA) 500 MG chemo tablet Take 3 tab(s) by mouth in the AM and 3 tab(s) by mouth in the PM on days 1-14, then off for 7 days. 84 tablet 3   • lidocaine-prilocaine (EMLA) 2.5-2.5 % cream Apply  topically to the appropriate area as directed As Needed (45-60 minutes prior to port access.  Cover with saran/plastic wrap.). 30 g 3   • raNITIdine (ZANTAC) 150 MG tablet Take 150 mg by mouth As Needed for Heartburn.       No current facility-administered medications for this visit.        Glycemic Risk:   n/a    Weight:   Height: 63 inches  Weight: 139 lbs.  Usual Body Weight: same lbs.   BMI: 24.8  Normal  Weight has been stable    Oral Food Intake:  Regular Diet - No Restrictions  Ensure - 1/day    Hydration Status:   How many 8 ounce glass of water of fluid do you drink per day?  Patient reports drinking mostly coffee    Enteral Feeding:   n/a    Nutrition Symptoms:   Altered Apetite  Fatigue    Activity:   Not assessed at this time     reports that she has never smoked. She has never used smokeless tobacco. She reports that she does not drink alcohol or use drugs.    Evaluation of Nutritional Risk:   Patient has been identified at nutritional risk due to diagnosis and treatment plan.  Met with patient and her niece during her initial chemotherapy infusion appointment.  Patient reports healing well from surgery; states her  "bowels are functioning normally; and states her appetite is slowly improving.    Discussed the importance of good nutrition during her treatment course focusing on adequate calorie, protein, nutrient and fluid intake to maintain her nutritional/weight status.  Advised her to be consuming smaller more frequent snacks throughout the day to aid with potential nausea management.  Emphasized the importance of protein and its role in the diet; reviewed high protein foods; and recommended she have a protein source at each meal/snack.  Also emphasized the importance of hydration; reviewed good hydrating fluid options; and recommended she drink at least 64 ounces daily.  Discussed nutritional supplements and their role in the diet and provided samples and coupons of Ensure Enlive.  Reviewed the ACS nutrition booklet and suggested they use it for symptom management as needed.  Provided and reviewed written diet material \"Oxaliplatin\" and discussed tips to avoid cold sensitivities.    Answered their questions and both voiced understanding of information discussed.  RD's contact information provided and encouraged them to call with questions.  Will monitor as needed during her treatment course.    Electronically signed by:  Eden Hall RD  9:13 AM  "

## 2018-10-15 ENCOUNTER — TELEPHONE (OUTPATIENT)
Dept: ONCOLOGY | Facility: CLINIC | Age: 78
End: 2018-10-15

## 2018-10-15 NOTE — TELEPHONE ENCOUNTER
Advised to keep appt tomorrow as scheduled for assessment. Pt not running a fever, or having other symptoms per her niece

## 2018-10-15 NOTE — TELEPHONE ENCOUNTER
----- Message from Christiane Chapin sent at 10/15/2018 12:55 PM EDT -----  Regarding: BANDAR - PATIENT HAS A COLD AND COUGH  Contact: [phone]  THE PATIENT HAS A COLD AND COUGH AND HAS TREATMENT TOMORROW. IS IT OK TO STILL TREAT WITH HER BEING ILL?

## 2018-10-16 ENCOUNTER — DOCUMENTATION (OUTPATIENT)
Dept: NUTRITION | Facility: HOSPITAL | Age: 78
End: 2018-10-16

## 2018-10-16 ENCOUNTER — APPOINTMENT (OUTPATIENT)
Dept: ONCOLOGY | Facility: HOSPITAL | Age: 78
End: 2018-10-16
Attending: INTERNAL MEDICINE

## 2018-10-16 ENCOUNTER — INFUSION (OUTPATIENT)
Dept: ONCOLOGY | Facility: HOSPITAL | Age: 78
End: 2018-10-16
Attending: INTERNAL MEDICINE

## 2018-10-16 ENCOUNTER — OFFICE VISIT (OUTPATIENT)
Dept: ONCOLOGY | Facility: CLINIC | Age: 78
End: 2018-10-16

## 2018-10-16 VITALS
RESPIRATION RATE: 18 BRPM | SYSTOLIC BLOOD PRESSURE: 173 MMHG | DIASTOLIC BLOOD PRESSURE: 85 MMHG | BODY MASS INDEX: 25.07 KG/M2 | TEMPERATURE: 98 F | WEIGHT: 141.5 LBS | HEART RATE: 72 BPM | HEIGHT: 63 IN | OXYGEN SATURATION: 98 %

## 2018-10-16 DIAGNOSIS — C18.9 MALIGNANT NEOPLASM OF COLON, UNSPECIFIED PART OF COLON (HCC): Primary | ICD-10-CM

## 2018-10-16 LAB
CREAT BLDA-MCNC: 0.8 MG/DL (ref 0.6–1.3)
ERYTHROCYTE [DISTWIDTH] IN BLOOD BY AUTOMATED COUNT: 19.6 % (ref 11.3–14.5)
HCT VFR BLD AUTO: 35.2 % (ref 34.5–44)
HGB BLD-MCNC: 11.7 G/DL (ref 11.5–15.5)
LYMPHOCYTES # BLD AUTO: 4.9 10*3/MM3 (ref 0.6–4.8)
LYMPHOCYTES NFR BLD AUTO: 47.4 % (ref 24–44)
MCH RBC QN AUTO: 30.8 PG (ref 27–31)
MCHC RBC AUTO-ENTMCNC: 33.2 G/DL (ref 32–36)
MCV RBC AUTO: 92.7 FL (ref 80–99)
MONOCYTES # BLD AUTO: 0.4 10*3/MM3 (ref 0–1)
MONOCYTES NFR BLD AUTO: 4.3 % (ref 0–12)
NEUTROPHILS # BLD AUTO: 5 10*3/MM3 (ref 1.5–8.3)
NEUTROPHILS NFR BLD AUTO: 48.3 % (ref 41–71)
PLATELET # BLD AUTO: 216 10*3/MM3 (ref 150–450)
PMV BLD AUTO: 7.8 FL (ref 6–12)
RBC # BLD AUTO: 3.79 10*6/MM3 (ref 3.89–5.14)
WBC NRBC COR # BLD: 10.3 10*3/MM3 (ref 3.5–10.8)

## 2018-10-16 PROCEDURE — 99214 OFFICE O/P EST MOD 30 MIN: CPT | Performed by: NURSE PRACTITIONER

## 2018-10-16 PROCEDURE — 96375 TX/PRO/DX INJ NEW DRUG ADDON: CPT

## 2018-10-16 PROCEDURE — 96415 CHEMO IV INFUSION ADDL HR: CPT

## 2018-10-16 PROCEDURE — 96413 CHEMO IV INFUSION 1 HR: CPT

## 2018-10-16 PROCEDURE — 25010000002 OXALIPLATIN PER 0.5 MG: Performed by: INTERNAL MEDICINE

## 2018-10-16 PROCEDURE — 25010000002 DEXAMETHASONE PER 1 MG: Performed by: INTERNAL MEDICINE

## 2018-10-16 PROCEDURE — 82565 ASSAY OF CREATININE: CPT

## 2018-10-16 PROCEDURE — 25010000002 ALTEPLASE 2 MG RECONSTITUTED SOLUTION: Performed by: INTERNAL MEDICINE

## 2018-10-16 PROCEDURE — 85025 COMPLETE CBC W/AUTO DIFF WBC: CPT

## 2018-10-16 PROCEDURE — 25010000002 PALONOSETRON PER 25 MCG: Performed by: INTERNAL MEDICINE

## 2018-10-16 PROCEDURE — 36593 DECLOT VASCULAR DEVICE: CPT

## 2018-10-16 RX ORDER — SODIUM CHLORIDE 0.9 % (FLUSH) 0.9 %
10 SYRINGE (ML) INJECTION AS NEEDED
Status: CANCELLED | OUTPATIENT
Start: 2018-10-16

## 2018-10-16 RX ORDER — SODIUM CHLORIDE 0.9 % (FLUSH) 0.9 %
10 SYRINGE (ML) INJECTION AS NEEDED
Status: DISCONTINUED | OUTPATIENT
Start: 2018-10-16 | End: 2018-10-16 | Stop reason: HOSPADM

## 2018-10-16 RX ORDER — PALONOSETRON 0.05 MG/ML
0.25 INJECTION, SOLUTION INTRAVENOUS ONCE
Status: COMPLETED | OUTPATIENT
Start: 2018-10-16 | End: 2018-10-16

## 2018-10-16 RX ORDER — DEXTROSE MONOHYDRATE 50 MG/ML
250 INJECTION, SOLUTION INTRAVENOUS ONCE
Status: COMPLETED | OUTPATIENT
Start: 2018-10-16 | End: 2018-10-16

## 2018-10-16 RX ADMIN — Medication 10 ML: at 15:39

## 2018-10-16 RX ADMIN — OXALIPLATIN 150 MG: 5 INJECTION, SOLUTION INTRAVENOUS at 13:38

## 2018-10-16 RX ADMIN — DEXTROSE MONOHYDRATE 250 ML: 50 INJECTION, SOLUTION INTRAVENOUS at 13:22

## 2018-10-16 RX ADMIN — DEXAMETHASONE SODIUM PHOSPHATE 12 MG: 4 INJECTION, SOLUTION INTRAMUSCULAR; INTRAVENOUS at 13:22

## 2018-10-16 RX ADMIN — PALONOSETRON HYDROCHLORIDE 0.25 MG: 0.25 INJECTION, SOLUTION INTRAVENOUS at 13:22

## 2018-10-16 RX ADMIN — HEPARIN 500 UNITS: 100 SYRINGE at 15:39

## 2018-10-16 RX ADMIN — ALTEPLASE 2 MG: 2.2 INJECTION, POWDER, LYOPHILIZED, FOR SOLUTION INTRAVENOUS at 12:45

## 2018-10-16 NOTE — PROGRESS NOTES
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     HISTORY OF PRESENT ILLNESS: The patient is a very pleasant 78 y.o. female  with past medical history significant for colon cancer diagnosed 07/04/18. The patient presented to [facility] for gastritis and right lower quadrant pain.  This has been going on for the last few months associated with nausea but no vomiting.  She's had been having melena as well, which was a new problem for her. She had previous negative colonoscopy.  Colon polyp was found and biopsied. Surgical pathology 07/04/18 revealed invasive poorly differentiated adenocarcinoma in ascending colon. CT abdomen and pelvis 07/06/18 showed mass within the ascending colon with large mercy mass in the adjacent mesentery and prominent nodes around the cecum. The patient presented to Dr Juarez for elective right hemicolectomy on 08/01/18. Pathology 08/01/18 from colon and terminal ileum resection revealed poorly differentiated colonic adenocarcinoma ([id number]) with metastatic adenocarcinoma to 1 of 25 lymph nodes.  She was started on adjuvant chemotherapy using oxaliplatin with Xeloda September 25, 2018.  The patient is here today for follow up visit with treatment cycle #2.        SUBJECTIVE: The patient is doing fairly well today. She tolerated her first dose of chemotherapy without significant side effects. She noticed some mild cold intolerance but this only lasted for a couple days following her infusion. She denies numbness or tingling. She has no nausea or vomiting. She has had a cold with some mild nasal congestion over the past two days. She denies fever or chills.     PAST MEDICAL HISTORY/SOCIAL HISTORY/FAMILY HISTORY: Reviewed by me and unchanged from my documentation done on 09/17/18.    Review of Systems   Constitutional: Negative for activity change, appetite change, chills, fatigue, fever  "and unexpected weight change.   HENT: Negative for hearing loss, mouth sores, nosebleeds, sore throat and trouble swallowing.         Taste disturbance, nasal congestion   Eyes: Negative for visual disturbance.   Respiratory: Negative for cough, chest tightness, shortness of breath and wheezing.    Cardiovascular: Negative for chest pain, palpitations and leg swelling.   Gastrointestinal: Negative for abdominal distention, abdominal pain, blood in stool, constipation, diarrhea, nausea, rectal pain and vomiting.   Endocrine: Negative for cold intolerance and heat intolerance.   Genitourinary: Negative for difficulty urinating, dysuria, frequency and urgency.   Musculoskeletal: Negative for arthralgias, back pain, gait problem, joint swelling and myalgias.   Skin: Negative for rash.   Neurological: Negative for dizziness, tremors, syncope, weakness, light-headedness, numbness and headaches.   Hematological: Negative for adenopathy. Does not bruise/bleed easily.   Psychiatric/Behavioral: Negative for confusion, sleep disturbance and suicidal ideas. The patient is not nervous/anxious.          Current Outpatient Prescriptions:   •  atenolol (TENORMIN) 50 MG tablet, Take 50 mg by mouth Daily., Disp: , Rfl:   •  capecitabine (XELODA) 500 MG chemo tablet, Take 3 tab(s) by mouth in the AM and 3 tab(s) by mouth in the PM on days 1-14, then off for 7 days., Disp: 84 tablet, Rfl: 3  •  lidocaine-prilocaine (EMLA) 2.5-2.5 % cream, Apply  topically to the appropriate area as directed As Needed (45-60 minutes prior to port access.  Cover with saran/plastic wrap.)., Disp: 30 g, Rfl: 3  •  raNITIdine (ZANTAC) 150 MG tablet, Take 150 mg by mouth As Needed for Heartburn., Disp: , Rfl:     PHYSICAL EXAMINATION:   /85   Pulse 72   Temp 98 °F (36.7 °C) (Temporal Artery )   Resp 18   Ht 160 cm (62.99\")   Wt 64.2 kg (141 lb 8 oz)   SpO2 98% Comment: RA  BMI 25.07 kg/m²    ECOG Performance Status: 1 - Symptomatic but " completely ambulatory  General Appearance:  alert, cooperative, no apparent distress and appears stated age   Neurologic/Psychiatric: A&O x 3, gait steady, appropriate affect, strength 5/5 in all muscle groups   HEENT:  Normocephalic, without obvious abnormality, mucous membranes moist   Neck: Supple, symmetrical, trachea midline, no adenopathy;  No thyromegaly, masses, or tenderness   Lungs:   Clear to auscultation bilaterally; respirations regular, even, and unlabored bilaterally   Heart:  Regular rate and rhythm, no murmurs appreciated   Abdomen:   Soft, non-tender, non-distended and no organomegaly   Lymph nodes: No cervical, supraclavicular, inguinal or axillary adenopathy noted   Extremities: Normal, atraumatic; no clubbing, cyanosis, or edema    Skin: No rashes, ulcers, or suspicious lesions noted     No visits with results within 2 Week(s) from this visit.   Latest known visit with results is:   Hospital Outpatient Visit on 09/25/2018   Component Date Value Ref Range Status   • Creatinine 09/25/2018 0.80  0.60 - 1.30 mg/dL Final    Serial Number: [id number]:  [id number]        Ct Chest With Contrast    Result Date: 9/25/2018  Narrative: EXAMINATION: CT CHEST W CONTRAST-  INDICATION: Followup scan; C18.9-Malignant neoplasm of colon, unspecified.  TECHNIQUE:  Axial CT data of the chest were acquired helically with contrast.  Multiplanar reformatted images were generated and reviewed. The radiation dose reduction device was turned on for each scan per the ALARA (As Low as Reasonably Achievable) protocol  COMPARISONS:  Outside facility CT abdomen/pelvis 07/06/2018.  FINDINGS: Patent central airways. Mild cardiomegaly. Three-vessel coronary artery disease. Trace pericardial effusion. No enlarged thoracic lymph node within the axillae, mediastinum or ana. No noncalcified lung nodule or mass. Scattered evidence of old granulomatous disease in the lingula. No pleural effusion or pneumothorax. Incidental imaging of  the upper abdomen reveals cholecystectomy clips and postcholecystectomy biliary ectasia; however, in the right lobe of the liver (axial series image #48 and 57) there are a couple of hypodense lesions that were not definitely present on the comparison scan from July 2018.      Impression: 1. No evidence of lung metastases. 2. Apparently new right lobe liver lesions require further characterization. This can be performed with MRI or CT liver mass protocol.  D:  09/25/2018 E:  09/25/2018  This report was finalized on 9/25/2018 2:09 PM by Noam Bolden.      Xr Chest 1 View    Result Date: 9/20/2018  Narrative: EXAM:  XR Chest, 1 View EXAM DATE/TIME:  9/20/2018 5:44 PM CLINICAL HISTORY:  78 years old, female; Device placement; Other: Port; Prior surgery; Surgery date: Post-operative (0-2 days); Additional info: Colon cancer, port placement TECHNIQUE:  XR of the chest, 1 view. COMPARISON:  CR XR CHEST 2 VW 7/26/2018 2:00 PM FINDINGS:  Tubes, catheters and devices:  Port-A-Cath on the left subclavian catheter tip at the upper SVC.  Lungs:  There is no focal pulmonary consolidation.  Pleural space:  There is no pleural effusion or pneumothorax.  Heart/Mediastinum:  The cardiac silhouette is prominent, which in part may be related to portable technique. Atherosclerotic calcifications are noted in the aortic knob.  Bones/joints:  Degenerative changes at the shoulders bilaterally.     Impression: No acute cardiopulmonary disease. THIS DOCUMENT HAS BEEN ELECTRONICALLY SIGNED BY SAMRA PARRY MD    Fl C Arm During Surgery    Result Date: 9/21/2018  Narrative: EXAMINATION: FL C-ARM DURING SURGERY-09/20/2018:  INDICATION: Port placement.  TECHNIQUE: Intraoperative fluoroscopy for improved localization and treatment planning.  COMPARISON: Chest x-ray 07/26/2018.  FINDINGS: Intraoperative fluoroscopy with total fluoroscopic time usage 6 seconds and 2 representative images saved during left chest wall Port-A-Cath placement. The  catheter terminates at the cavoatrial junction.      Impression: Intraoperative fluoroscopy utilized during left chest wall Port-A-Cath placement.  D:  09/21/2018 E:  09/21/2018     This report was finalized on 9/21/2018 8:43 AM by Dr. Josafat Brown.        ASSESSMENT: The patient is a very pleasant 78 y.o. 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  Ascending colon adenocarcinoma T3 N1 M0 stage IIIa:  A.  Presenting with abdominal pain and irregular bowel movements  B.  Diagnosed after colonoscopy with a biopsy done on June 27, 2018  C.  Status post right hemicolectomy done by Dr. Wong August 01, 2018  D.  Final pathology revealed with 1 out of 25 positive mesenteric lymph nodes  E. Started adjuvant chemotherapy using Xeloda with oxaliplatin September 25, 2018, status post 1 cycle of treatment.   E. CEA normal at diagnosis  2.  Hypertension  3.  Normocytic anemia  4.  Two hypodense right hepatic lobe liver lesions.     PLAN:   1. I will proceed with treatment as scheduled today Xeloda with oxaliplatin cycle #2.    2.  The patient will follow-up with me in 3 weeks for cycle #3 out of 8 planned.  3.  I will monitor patient blood work including blood counts, kidney function, liver enzymes and electrolytes.  4.  I will repeat patient's scans in 3 months to follow-up on 2 small right hepatic lobe liver lesions, which will be due 12/25/2018.  5.  I did go over potential side effects of treatment including diarrhea and skin rash as well as peripheral neuropathy.  I will set her up for premedication with my nurse practitioner.  6.  I will start the patient on Zofran as needed for chemotherapy induced nausea.  7.  We will monitor patient blood pressure while she is on active chemotherapy.  I might have to hold her antihypertensive agent.  8.  I explained to the patient there is no value repeating her CEA level since it was normal preoperatively.  9.  We'll  continue routine port care with each infusion.  10. We will have Candido, our financial counselor, touch base with them regarding financial assistance with Xeloda.     Liana Cervantes, APRN  10/16/2018

## 2018-10-16 NOTE — PROGRESS NOTES
Onc Nutrition     Patient Name:  Su Pedraza  YOB: 1940    Weight: 141#; stable  Nutrition Symptoms: none    Follow up with patient and her niece during her chemo infusion appointment.  She denies significant nutritional complaints at this time.  She states her appetite is about the same and reports she continues to drink ~1 Ensure daily to supplement her oral intake.    Encouraged her to continue to improve her oral intake and to supplement with Ensure as needed to aid with calorie and protein intake.  She denies nutritional questions or concerns at this time.  Advised her to call RD if questions arise.  She voiced understanding of information discussed.  Will follow up as indicated.    Electronically signed by:  Eden Hall RD  10/16/18 2:37 PM

## 2018-10-17 ENCOUNTER — SPECIALTY PHARMACY (OUTPATIENT)
Dept: ONCOLOGY | Facility: HOSPITAL | Age: 78
End: 2018-10-17

## 2018-10-17 NOTE — PROGRESS NOTES
Oral Chemotherapy Teaching      Patient Name/:  Su Pedraza   1940  Oral Chemotherapy Regimen:  Xeloda 1500mg PO BID x 14 days, followed by 7 days off + IV Oxaliplatin IV on Day 1 of a 21 day cycle    Date Started Medication:   Cycle 1:  18  Cycle 2: 10/16/18  Cycle 3: anticipate 18      Additional Notes:  Mailing patient calendar per request for Cycle 2. Will plan to see in infusion on 18 for Cycle 3.

## 2018-11-05 ENCOUNTER — OFFICE VISIT (OUTPATIENT)
Dept: ONCOLOGY | Facility: CLINIC | Age: 78
End: 2018-11-05

## 2018-11-05 ENCOUNTER — INFUSION (OUTPATIENT)
Dept: ONCOLOGY | Facility: HOSPITAL | Age: 78
End: 2018-11-05
Attending: INTERNAL MEDICINE

## 2018-11-05 VITALS
TEMPERATURE: 98.6 F | DIASTOLIC BLOOD PRESSURE: 74 MMHG | BODY MASS INDEX: 23.85 KG/M2 | HEIGHT: 63 IN | SYSTOLIC BLOOD PRESSURE: 125 MMHG | HEART RATE: 59 BPM | WEIGHT: 134.6 LBS | RESPIRATION RATE: 18 BRPM

## 2018-11-05 DIAGNOSIS — C18.9 MALIGNANT NEOPLASM OF COLON, UNSPECIFIED PART OF COLON (HCC): Primary | ICD-10-CM

## 2018-11-05 LAB
ALBUMIN SERPL-MCNC: 3.56 G/DL (ref 3.2–4.8)
ALBUMIN/GLOB SERPL: 1.3 G/DL (ref 1.5–2.5)
ALP SERPL-CCNC: 78 U/L (ref 25–100)
ALT SERPL W P-5'-P-CCNC: 9 U/L (ref 7–40)
ANION GAP SERPL CALCULATED.3IONS-SCNC: 6 MMOL/L (ref 3–11)
AST SERPL-CCNC: 15 U/L (ref 0–33)
BILIRUB SERPL-MCNC: 0.7 MG/DL (ref 0.3–1.2)
BUN BLD-MCNC: 17 MG/DL (ref 9–23)
BUN/CREAT SERPL: 21 (ref 7–25)
CALCIUM SPEC-SCNC: 8 MG/DL (ref 8.7–10.4)
CHLORIDE SERPL-SCNC: 97 MMOL/L (ref 99–109)
CO2 SERPL-SCNC: 27 MMOL/L (ref 20–31)
CREAT BLD-MCNC: 0.81 MG/DL (ref 0.6–1.3)
ERYTHROCYTE [DISTWIDTH] IN BLOOD BY AUTOMATED COUNT: 19.6 % (ref 11.3–14.5)
GFR SERPL CREATININE-BSD FRML MDRD: 68 ML/MIN/1.73
GLOBULIN UR ELPH-MCNC: 2.6 GM/DL
GLUCOSE BLD-MCNC: 93 MG/DL (ref 70–100)
HCT VFR BLD AUTO: 34.2 % (ref 34.5–44)
HGB BLD-MCNC: 11.4 G/DL (ref 11.5–15.5)
LYMPHOCYTES # BLD AUTO: 7.7 10*3/MM3 (ref 0.6–4.8)
LYMPHOCYTES NFR BLD AUTO: 62.3 % (ref 24–44)
MCH RBC QN AUTO: 31.4 PG (ref 27–31)
MCHC RBC AUTO-ENTMCNC: 33.4 G/DL (ref 32–36)
MCV RBC AUTO: 94.1 FL (ref 80–99)
MONOCYTES # BLD AUTO: 0.3 10*3/MM3 (ref 0–1)
MONOCYTES NFR BLD AUTO: 2.5 % (ref 0–12)
NEUTROPHILS # BLD AUTO: 4.4 10*3/MM3 (ref 1.5–8.3)
NEUTROPHILS NFR BLD AUTO: 35.2 % (ref 41–71)
PLATELET # BLD AUTO: 187 10*3/MM3 (ref 150–450)
PMV BLD AUTO: 7.7 FL (ref 6–12)
POTASSIUM BLD-SCNC: 3.9 MMOL/L (ref 3.5–5.5)
PROT SERPL-MCNC: 6.2 G/DL (ref 5.7–8.2)
RBC # BLD AUTO: 3.64 10*6/MM3 (ref 3.89–5.14)
SODIUM BLD-SCNC: 130 MMOL/L (ref 132–146)
WBC NRBC COR # BLD: 12.4 10*3/MM3 (ref 3.5–10.8)

## 2018-11-05 PROCEDURE — 96361 HYDRATE IV INFUSION ADD-ON: CPT

## 2018-11-05 PROCEDURE — 96375 TX/PRO/DX INJ NEW DRUG ADDON: CPT

## 2018-11-05 PROCEDURE — 96374 THER/PROPH/DIAG INJ IV PUSH: CPT

## 2018-11-05 PROCEDURE — 96360 HYDRATION IV INFUSION INIT: CPT

## 2018-11-05 PROCEDURE — 85025 COMPLETE CBC W/AUTO DIFF WBC: CPT

## 2018-11-05 PROCEDURE — 80053 COMPREHEN METABOLIC PANEL: CPT

## 2018-11-05 PROCEDURE — 25010000002 ONDANSETRON PER 1 MG: Performed by: INTERNAL MEDICINE

## 2018-11-05 PROCEDURE — 99214 OFFICE O/P EST MOD 30 MIN: CPT | Performed by: NURSE PRACTITIONER

## 2018-11-05 RX ORDER — SODIUM CHLORIDE 0.9 % (FLUSH) 0.9 %
10 SYRINGE (ML) INJECTION AS NEEDED
Status: CANCELLED | OUTPATIENT
Start: 2018-11-05

## 2018-11-05 RX ORDER — PALONOSETRON 0.05 MG/ML
0.25 INJECTION, SOLUTION INTRAVENOUS ONCE
Status: CANCELLED | OUTPATIENT
Start: 2018-11-12

## 2018-11-05 RX ORDER — DEXTROSE MONOHYDRATE 50 MG/ML
250 INJECTION, SOLUTION INTRAVENOUS ONCE
Status: CANCELLED | OUTPATIENT
Start: 2018-11-12

## 2018-11-05 RX ADMIN — ONDANSETRON HYDROCHLORIDE 12 MG: 2 INJECTION, SOLUTION INTRAMUSCULAR; INTRAVENOUS at 13:50

## 2018-11-05 RX ADMIN — HEPARIN 500 UNITS: 100 SYRINGE at 14:33

## 2018-11-05 RX ADMIN — SODIUM CHLORIDE 1000 ML: 9 INJECTION, SOLUTION INTRAVENOUS at 12:50

## 2018-11-05 NOTE — PROGRESS NOTES
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     HISTORY OF PRESENT ILLNESS: The patient is a very pleasant 78 y.o. female  with past medical history significant for colon cancer diagnosed 07/04/18. The patient presented to [facility] for gastritis and right lower quadrant pain.  This has been going on for the last few months associated with nausea but no vomiting.  She's had been having melena as well, which was a new problem for her. She had previous negative colonoscopy.  Colon polyp was found and biopsied. Surgical pathology 07/04/18 revealed invasive poorly differentiated adenocarcinoma in ascending colon. CT abdomen and pelvis 07/06/18 showed mass within the ascending colon with large mercy mass in the adjacent mesentery and prominent nodes around the cecum. The patient presented to Dr Juarez for elective right hemicolectomy on 08/01/18. Pathology 08/01/18 from colon and terminal ileum resection revealed poorly differentiated colonic adenocarcinoma ([id number]) with metastatic adenocarcinoma to 1 of 25 lymph nodes.  She was started on adjuvant chemotherapy using oxaliplatin with Xeloda September 25, 2018.  The patient is here today for follow up visit with treatment cycle #3.        SUBJECTIVE: The patient is not feeling well today. She has had an acute onset of abdominal cramping, nausea, vomiting, and diarrhea over the weekend. This was accompanied by chills and fever. She has not vomited today, however her abdominal cramping as continued. She feels like her fever broke this morning as she had an episodes of sweating this morning. She has lost about 7 lbs since her last visit. She notes food doesn't taste good, and she doesn't have a good appetite. She is feels tired and weak today.     PAST MEDICAL HISTORY/SOCIAL HISTORY/FAMILY HISTORY: Reviewed by me and unchanged from my documentation done on  09/17/18.    Review of Systems   Constitutional: Positive for appetite change and unexpected weight change. Negative for activity change, chills, fatigue and fever.   HENT: Negative for hearing loss, mouth sores, nosebleeds, sore throat and trouble swallowing.    Eyes: Negative for visual disturbance.   Respiratory: Negative for cough, chest tightness, shortness of breath and wheezing.    Cardiovascular: Negative for chest pain, palpitations and leg swelling.   Gastrointestinal: Positive for abdominal pain, diarrhea, nausea and vomiting. Negative for abdominal distention, blood in stool, constipation and rectal pain.   Endocrine: Negative for cold intolerance and heat intolerance.   Genitourinary: Negative for difficulty urinating, dysuria, frequency and urgency.   Musculoskeletal: Negative for arthralgias, back pain, gait problem, joint swelling and myalgias.   Skin: Negative for rash.   Neurological: Negative for dizziness, tremors, syncope, weakness, light-headedness, numbness and headaches.   Hematological: Negative for adenopathy. Does not bruise/bleed easily.   Psychiatric/Behavioral: Negative for confusion, sleep disturbance and suicidal ideas. The patient is not nervous/anxious.          Current Outpatient Prescriptions:   •  atenolol (TENORMIN) 50 MG tablet, Take 50 mg by mouth Daily., Disp: , Rfl:   •  capecitabine (XELODA) 500 MG chemo tablet, Take 3 tab(s) by mouth in the AM and 3 tab(s) by mouth in the PM on days 1-14, then off for 7 days., Disp: 84 tablet, Rfl: 3  •  lidocaine-prilocaine (EMLA) 2.5-2.5 % cream, Apply  topically to the appropriate area as directed As Needed (45-60 minutes prior to port access.  Cover with saran/plastic wrap.)., Disp: 30 g, Rfl: 3  •  raNITIdine (ZANTAC) 150 MG tablet, Take 150 mg by mouth As Needed for Heartburn., Disp: , Rfl:     PHYSICAL EXAMINATION:   There were no vitals taken for this visit.   ECOG Performance Status: 1 - Symptomatic but completely  ambulatory  General Appearance:  alert, cooperative, no apparent distress and appears stated age   Neurologic/Psychiatric: A&O x 3, gait steady, appropriate affect, strength 5/5 in all muscle groups   HEENT:  Normocephalic, without obvious abnormality, mucous membranes moist   Neck: Supple, symmetrical, trachea midline, no adenopathy;  No thyromegaly, masses, or tenderness   Lungs:   Clear to auscultation bilaterally; respirations regular, even, and unlabored bilaterally   Heart:  Regular rate and rhythm, no murmurs appreciated   Abdomen:   Soft, non-tender, non-distended and no organomegaly   Lymph nodes: No cervical, supraclavicular, inguinal or axillary adenopathy noted   Extremities: Normal, atraumatic; no clubbing, cyanosis, or edema    Skin: No rashes, ulcers, or suspicious lesions noted     No visits with results within 2 Week(s) from this visit.   Latest known visit with results is:   Infusion on 10/16/2018   Component Date Value Ref Range Status   • WBC 10/16/2018 10.30  3.50 - 10.80 10*3/mm3 Final   • RBC 10/16/2018 3.79* 3.89 - 5.14 10*6/mm3 Final   • Hemoglobin 10/16/2018 11.7  11.5 - 15.5 g/dL Final   • Hematocrit 10/16/2018 35.2  34.5 - 44.0 % Final   • RDW 10/16/2018 19.6* 11.3 - 14.5 % Final   • MCV 10/16/2018 92.7  80.0 - 99.0 fL Final   • MCH 10/16/2018 30.8  27.0 - 31.0 pg Final   • MCHC 10/16/2018 33.2  32.0 - 36.0 g/dL Final   • MPV 10/16/2018 7.8  6.0 - 12.0 fL Final   • Platelets 10/16/2018 216  150 - 450 10*3/mm3 Final   • Neutrophil % 10/16/2018 48.3  41.0 - 71.0 % Final   • Lymphocyte % 10/16/2018 47.4* 24.0 - 44.0 % Final   • Monocyte % 10/16/2018 4.3  0.0 - 12.0 % Final   • Neutrophils, Absolute 10/16/2018 5.00  1.50 - 8.30 10*3/mm3 Final   • Lymphocytes, Absolute 10/16/2018 4.90* 0.60 - 4.80 10*3/mm3 Final   • Monocytes, Absolute 10/16/2018 0.40  0.00 - 1.00 10*3/mm3 Final   • Creatinine 10/16/2018 0.80  0.60 - 1.30 mg/dL Final    Serial Number: [id number]:  [id number]   • Creatinine  10/16/2018 0.80  0.60 - 1.30 mg/dL Final    Serial Number: [id number]:  [id number]   • Creatinine 10/16/2018 0.80  0.60 - 1.30 mg/dL Final    Serial Number: [id number]:  [id number]        No results found.    ASSESSMENT: The patient is a very pleasant 78 y.o. 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  Ascending colon adenocarcinoma T3 N1 M0 stage IIIa:  A.  Presenting with abdominal pain and irregular bowel movements  B.  Diagnosed after colonoscopy with a biopsy done on June 27, 2018  C.  Status post right hemicolectomy done by Dr. Wong August 01, 2018  D.  Final pathology revealed with 1 out of 25 positive mesenteric lymph nodes  E. Started adjuvant chemotherapy using Xeloda with oxaliplatin September 25, 2018, status post 2 cycle of treatment.   E. CEA normal at diagnosis  2.  Hypertension  3.  Normocytic anemia  4.  Two hypodense right hepatic lobe liver lesions.  5. Gastroenteritis    PLAN:   1. We will delay treatment by one week secondary to recent GI illness, dehydration, and fever.   2. We will see the patient back in 1 week for cycle # 3 of Xeloda Oxaliplatin. She will start her Xeloda pills that day as well.   3. We will give the patient 1 L IVF today with Zofran to help with dehydration and nausea.   4. We will check CBC and CMP today. We will continue to monitor labs throughout treatment including blood counts, kidney function, and liver functions. Her CEA was normal preoperatively.   5. We will consider adding Megace if needed for weight loss and poor appetite while on chemotherapy.   6. She will continue Zofran as needed for chemotherapy induced nausea.   7. We will continue routine port care with each infusion.   8. I asked the patient to call us with ongoing symptoms of diarrhea, nausea, vomiting, and fever.   9. We will due 3 month follow up CAT scans due to 2 small right hepatic lobe liver lesions. This will be due  12/25/2018.      Liana Cervantes, APRN  11/5/2018

## 2018-11-12 ENCOUNTER — OFFICE VISIT (OUTPATIENT)
Dept: ONCOLOGY | Facility: CLINIC | Age: 78
End: 2018-11-12

## 2018-11-12 ENCOUNTER — INFUSION (OUTPATIENT)
Dept: ONCOLOGY | Facility: HOSPITAL | Age: 78
End: 2018-11-12
Attending: INTERNAL MEDICINE

## 2018-11-12 VITALS
TEMPERATURE: 97.8 F | DIASTOLIC BLOOD PRESSURE: 76 MMHG | OXYGEN SATURATION: 99 % | HEART RATE: 63 BPM | HEIGHT: 63 IN | BODY MASS INDEX: 24.47 KG/M2 | RESPIRATION RATE: 18 BRPM | WEIGHT: 138.1 LBS | SYSTOLIC BLOOD PRESSURE: 142 MMHG

## 2018-11-12 DIAGNOSIS — C18.9 MALIGNANT NEOPLASM OF COLON, UNSPECIFIED PART OF COLON (HCC): Primary | ICD-10-CM

## 2018-11-12 LAB
ALBUMIN SERPL-MCNC: 3.22 G/DL (ref 3.2–4.8)
ALBUMIN/GLOB SERPL: 1.2 G/DL (ref 1.5–2.5)
ALP SERPL-CCNC: 121 U/L (ref 25–100)
ALT SERPL W P-5'-P-CCNC: 26 U/L (ref 7–40)
ANION GAP SERPL CALCULATED.3IONS-SCNC: 5 MMOL/L (ref 3–11)
AST SERPL-CCNC: 26 U/L (ref 0–33)
BILIRUB SERPL-MCNC: 0.4 MG/DL (ref 0.3–1.2)
BUN BLD-MCNC: 12 MG/DL (ref 9–23)
BUN/CREAT SERPL: 17.4 (ref 7–25)
CALCIUM SPEC-SCNC: 7.9 MG/DL (ref 8.7–10.4)
CHLORIDE SERPL-SCNC: 98 MMOL/L (ref 99–109)
CO2 SERPL-SCNC: 32 MMOL/L (ref 20–31)
CREAT BLD-MCNC: 0.69 MG/DL (ref 0.6–1.3)
CREAT BLDA-MCNC: 0.8 MG/DL (ref 0.6–1.3)
ERYTHROCYTE [DISTWIDTH] IN BLOOD BY AUTOMATED COUNT: 21.9 % (ref 11.3–14.5)
GFR SERPL CREATININE-BSD FRML MDRD: 82 ML/MIN/1.73
GLOBULIN UR ELPH-MCNC: 2.6 GM/DL
GLUCOSE BLD-MCNC: 96 MG/DL (ref 70–100)
HCT VFR BLD AUTO: 33.3 % (ref 34.5–44)
HGB BLD-MCNC: 11.1 G/DL (ref 11.5–15.5)
LYMPHOCYTES # BLD AUTO: 5.4 10*3/MM3 (ref 0.6–4.8)
LYMPHOCYTES NFR BLD AUTO: 52.5 % (ref 24–44)
MCH RBC QN AUTO: 31.3 PG (ref 27–31)
MCHC RBC AUTO-ENTMCNC: 33.2 G/DL (ref 32–36)
MCV RBC AUTO: 94.2 FL (ref 80–99)
MONOCYTES # BLD AUTO: 0.8 10*3/MM3 (ref 0–1)
MONOCYTES NFR BLD AUTO: 7.5 % (ref 0–12)
NEUTROPHILS # BLD AUTO: 4.1 10*3/MM3 (ref 1.5–8.3)
NEUTROPHILS NFR BLD AUTO: 40 % (ref 41–71)
PLATELET # BLD AUTO: 248 10*3/MM3 (ref 150–450)
PMV BLD AUTO: 7.5 FL (ref 6–12)
POTASSIUM BLD-SCNC: 3.4 MMOL/L (ref 3.5–5.5)
PROT SERPL-MCNC: 5.8 G/DL (ref 5.7–8.2)
RBC # BLD AUTO: 3.53 10*6/MM3 (ref 3.89–5.14)
SODIUM BLD-SCNC: 135 MMOL/L (ref 132–146)
WBC NRBC COR # BLD: 10.2 10*3/MM3 (ref 3.5–10.8)

## 2018-11-12 PROCEDURE — 85025 COMPLETE CBC W/AUTO DIFF WBC: CPT

## 2018-11-12 PROCEDURE — 82565 ASSAY OF CREATININE: CPT

## 2018-11-12 PROCEDURE — 25010000002 ALTEPLASE 2 MG RECONSTITUTED SOLUTION: Performed by: INTERNAL MEDICINE

## 2018-11-12 PROCEDURE — 80053 COMPREHEN METABOLIC PANEL: CPT

## 2018-11-12 PROCEDURE — 25010000002 OXALIPLATIN PER 0.5 MG: Performed by: NURSE PRACTITIONER

## 2018-11-12 PROCEDURE — 36593 DECLOT VASCULAR DEVICE: CPT

## 2018-11-12 PROCEDURE — 25010000002 DEXAMETHASONE PER 1 MG: Performed by: NURSE PRACTITIONER

## 2018-11-12 PROCEDURE — 96413 CHEMO IV INFUSION 1 HR: CPT

## 2018-11-12 PROCEDURE — 96375 TX/PRO/DX INJ NEW DRUG ADDON: CPT

## 2018-11-12 PROCEDURE — 99215 OFFICE O/P EST HI 40 MIN: CPT | Performed by: INTERNAL MEDICINE

## 2018-11-12 PROCEDURE — 96415 CHEMO IV INFUSION ADDL HR: CPT

## 2018-11-12 PROCEDURE — 25010000002 PALONOSETRON PER 25 MCG: Performed by: NURSE PRACTITIONER

## 2018-11-12 RX ORDER — PALONOSETRON 0.05 MG/ML
0.25 INJECTION, SOLUTION INTRAVENOUS ONCE
Status: COMPLETED | OUTPATIENT
Start: 2018-11-12 | End: 2018-11-12

## 2018-11-12 RX ORDER — SODIUM CHLORIDE 0.9 % (FLUSH) 0.9 %
10 SYRINGE (ML) INJECTION AS NEEDED
Status: CANCELLED | OUTPATIENT
Start: 2018-11-12

## 2018-11-12 RX ORDER — DEXTROSE MONOHYDRATE 50 MG/ML
250 INJECTION, SOLUTION INTRAVENOUS ONCE
Status: CANCELLED | OUTPATIENT
Start: 2018-12-03

## 2018-11-12 RX ORDER — PALONOSETRON 0.05 MG/ML
0.25 INJECTION, SOLUTION INTRAVENOUS ONCE
Status: CANCELLED | OUTPATIENT
Start: 2018-12-03

## 2018-11-12 RX ADMIN — OXALIPLATIN 150 MG: 5 INJECTION, SOLUTION INTRAVENOUS at 13:30

## 2018-11-12 RX ADMIN — ALTEPLASE 2 MG: 2.2 INJECTION, POWDER, LYOPHILIZED, FOR SOLUTION INTRAVENOUS at 12:22

## 2018-11-12 RX ADMIN — HEPARIN 500 UNITS: 100 SYRINGE at 15:32

## 2018-11-12 RX ADMIN — DEXAMETHASONE SODIUM PHOSPHATE 12 MG: 4 INJECTION, SOLUTION INTRA-ARTICULAR; INTRALESIONAL; INTRAMUSCULAR; INTRAVENOUS; SOFT TISSUE at 13:07

## 2018-11-12 RX ADMIN — PALONOSETRON HYDROCHLORIDE 0.25 MG: 0.25 INJECTION, SOLUTION INTRAVENOUS at 13:03

## 2018-11-12 NOTE — PROGRESS NOTES
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     HISTORY OF PRESENT ILLNESS: The patient is a very pleasant 78 y.o. female  with past medical history significant for colon cancer diagnosed 07/04/18. The patient presented to [facility] for gastritis and right lower quadrant pain.  This has been going on for the last few months associated with nausea but no vomiting.  She's been having one of dark stools.  Never had this problem before.  She had previous negative colonoscopy.  Colon polyp was found and biopsied. Surgical pathology 07/04/18 revealed invasive poorly differentiated adenocarcinoma in ascending colon. CT abdomen and pelvis 07/06/18 showed mass within the ascending colon with large mercy mass in the adjacent mesentery and prominent nodes around the cecum. The patient presented to Dr Juarez for elective right hemicolectomy on 08/01/18. Pathology 08/01/18 from colon and terminal ileum resection revealed poorly differentiated colonic adenocarcinoma ([id number]) with metastatic adenocarcinoma to 1 of 25 lymph nodes.  She was started on adjuvant chemotherapy using oxaliplatin with Xeloda September 25, 2018.  The patient is here today for follow up visit with treatment cycle #2.        SUBJECTIVE: The patient is her today with her daughter.  She is doing fairly well.  She had bad diarrhea with nausea for almost one week to have resolved a few days ago.  Denied any fevers.  She's been having on and off night sweats.    PAST MEDICAL HISTORY/SOCIAL HISTORY/FAMILY HISTORY: Reviewed by me and unchanged from my documentation done on 09/17/18.    Review of Systems   Constitutional: Negative for activity change, appetite change, chills, fatigue, fever and unexpected weight change.   HENT: Negative for hearing loss, mouth sores, nosebleeds, sore throat and trouble swallowing.    Eyes: Negative for visual disturbance.  "  Respiratory: Negative for cough, chest tightness, shortness of breath and wheezing.    Cardiovascular: Negative for chest pain, palpitations and leg swelling.   Gastrointestinal: Negative for abdominal distention, abdominal pain, blood in stool, constipation, diarrhea, nausea, rectal pain and vomiting.   Endocrine: Negative for cold intolerance and heat intolerance.   Genitourinary: Negative for difficulty urinating, dysuria, frequency and urgency.   Musculoskeletal: Negative for arthralgias, back pain, gait problem, joint swelling and myalgias.   Skin: Negative for rash.   Neurological: Negative for dizziness, tremors, syncope, weakness, light-headedness, numbness and headaches.   Hematological: Negative for adenopathy. Does not bruise/bleed easily.   Psychiatric/Behavioral: Negative for confusion, sleep disturbance and suicidal ideas. The patient is not nervous/anxious.          Current Outpatient Medications:   •  atenolol (TENORMIN) 50 MG tablet, Take 50 mg by mouth Daily., Disp: , Rfl:   •  capecitabine (XELODA) 500 MG chemo tablet, Take 3 tab(s) by mouth in the AM and 3 tab(s) by mouth in the PM on days 1-14, then off for 7 days., Disp: 84 tablet, Rfl: 3  •  lidocaine-prilocaine (EMLA) 2.5-2.5 % cream, Apply  topically to the appropriate area as directed As Needed (45-60 minutes prior to port access.  Cover with saran/plastic wrap.)., Disp: 30 g, Rfl: 3  •  raNITIdine (ZANTAC) 150 MG tablet, Take 150 mg by mouth As Needed for Heartburn., Disp: , Rfl:     PHYSICAL EXAMINATION:   /76   Pulse 63   Temp 97.8 °F (36.6 °C) (Temporal)   Resp 18   Ht 160 cm (62.99\")   Wt 62.6 kg (138 lb 1.6 oz)   SpO2 99% Comment: RA  BMI 24.47 kg/m²    ECOG Performance Status: 1 - Symptomatic but completely ambulatory  General Appearance:  alert, cooperative, no apparent distress and appears stated age   Neurologic/Psychiatric: A&O x 3, gait steady, appropriate affect, strength 5/5 in all muscle groups   HEENT:  " Normocephalic, without obvious abnormality, mucous membranes moist   Neck: Supple, symmetrical, trachea midline, no adenopathy;  No thyromegaly, masses, or tenderness   Lungs:   Clear to auscultation bilaterally; respirations regular, even, and unlabored bilaterally   Heart:  Regular rate and rhythm, no murmurs appreciated   Abdomen:   Soft, non-tender, non-distended and no organomegaly   Lymph nodes: No cervical, supraclavicular, inguinal or axillary adenopathy noted   Extremities: Normal, atraumatic; no clubbing, cyanosis, or edema    Skin: No rashes, ulcers, or suspicious lesions noted     Infusion on 11/05/2018   Component Date Value Ref Range Status   • Glucose 11/05/2018 93  70 - 100 mg/dL Final   • BUN 11/05/2018 17  9 - 23 mg/dL Final   • Creatinine 11/05/2018 0.81  0.60 - 1.30 mg/dL Final   • Sodium 11/05/2018 130* 132 - 146 mmol/L Final   • Potassium 11/05/2018 3.9  3.5 - 5.5 mmol/L Final   • Chloride 11/05/2018 97* 99 - 109 mmol/L Final   • CO2 11/05/2018 27.0  20.0 - 31.0 mmol/L Final   • Calcium 11/05/2018 8.0* 8.7 - 10.4 mg/dL Final   • Total Protein 11/05/2018 6.2  5.7 - 8.2 g/dL Final   • Albumin 11/05/2018 3.56  3.20 - 4.80 g/dL Final   • ALT (SGPT) 11/05/2018 9  7 - 40 U/L Final   • AST (SGOT) 11/05/2018 15  0 - 33 U/L Final   • Alkaline Phosphatase 11/05/2018 78  25 - 100 U/L Final   • Total Bilirubin 11/05/2018 0.7  0.3 - 1.2 mg/dL Final   • eGFR Non  Amer 11/05/2018 68  >60 mL/min/1.73 Final   • Globulin 11/05/2018 2.6  gm/dL Final   • A/G Ratio 11/05/2018 1.3* 1.5 - 2.5 g/dL Final   • BUN/Creatinine Ratio 11/05/2018 21.0  7.0 - 25.0 Final   • Anion Gap 11/05/2018 6.0  3.0 - 11.0 mmol/L Final   • WBC 11/05/2018 12.40* 3.50 - 10.80 10*3/mm3 Final   • RBC 11/05/2018 3.64* 3.89 - 5.14 10*6/mm3 Final   • Hemoglobin 11/05/2018 11.4* 11.5 - 15.5 g/dL Final   • Hematocrit 11/05/2018 34.2* 34.5 - 44.0 % Final   • RDW 11/05/2018 19.6* 11.3 - 14.5 % Final   • MCV 11/05/2018 94.1  80.0 - 99.0 fL  Final   • MCH 11/05/2018 31.4* 27.0 - 31.0 pg Final   • MCHC 11/05/2018 33.4  32.0 - 36.0 g/dL Final   • MPV 11/05/2018 7.7  6.0 - 12.0 fL Final   • Platelets 11/05/2018 187  150 - 450 10*3/mm3 Final   • Neutrophil % 11/05/2018 35.2* 41.0 - 71.0 % Final   • Lymphocyte % 11/05/2018 62.3* 24.0 - 44.0 % Final   • Monocyte % 11/05/2018 2.5  0.0 - 12.0 % Final   • Neutrophils, Absolute 11/05/2018 4.40  1.50 - 8.30 10*3/mm3 Final   • Lymphocytes, Absolute 11/05/2018 7.70* 0.60 - 4.80 10*3/mm3 Final   • Monocytes, Absolute 11/05/2018 0.30  0.00 - 1.00 10*3/mm3 Final        No results found.    ASSESSMENT: The patient is a very pleasant 78 y.o. 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  Ascending colon adenocarcinoma T3 N1 M0 stage IIIa:  A.  Presenting with abdominal pain and irregular bowel movements  B.  Diagnosed after colonoscopy with a biopsy done on June 27, 2018  C.  Status post right hemicolectomy done by Dr. Wong August 01, 2018  D.  Final pathology revealed with 1 out of 25 positive mesenteric lymph nodes  E. Started adjuvant chemotherapy using Xeloda with oxaliplatin September 25, 2018, status post 2 cycles  E. CEA normal at diagnosis  2.  Hypertension  3.  Normocytic anemia  4.  Two hypodense right hepatic lobe liver lesions.  5.  Chemotherapy-induced nausea  6.  Chemotherapy used diarrhea     PLAN:   1. I will proceed with treatment as scheduled today Xeloda with oxaliplatin cycle #3.    2.  The patient will follow-up with me in 3 weeks for cycle #4 out of 8 planned.  3.  I will monitor patient blood work including blood counts kidney function liver enzymes and electrolytes.  4.  I will repeat patient's scans in 3 months to follow-up on 2 small right hepatic lobe liver lesions.  This would be due before cycle #5.  5.  I did go over potential side effects of treatment including diarrhea and skin rash as well as peripheral neuropathy.  I will set  her up for premedication with my nurse practitioner.  6.  I will start the patient on Zofran as needed for chemotherapy induced nausea.  7.  We will monitor patient blood pressure while she is on active chemotherapy.  I might have to hold her antihypertensive agent.  8.  I explained to the patient there is no value repeating her CEA level since it was normal preoperatively.  9.  We'll continue port care.  10.  I will start the patient on Imodium as needed for chemotherapy-induced diarrhea.  Schuyler Coleman MD  11/12/2018

## 2018-12-03 ENCOUNTER — INFUSION (OUTPATIENT)
Dept: ONCOLOGY | Facility: HOSPITAL | Age: 78
End: 2018-12-03
Attending: INTERNAL MEDICINE

## 2018-12-03 ENCOUNTER — OFFICE VISIT (OUTPATIENT)
Dept: ONCOLOGY | Facility: CLINIC | Age: 78
End: 2018-12-03

## 2018-12-03 VITALS
HEIGHT: 63 IN | TEMPERATURE: 98 F | BODY MASS INDEX: 23.04 KG/M2 | HEART RATE: 70 BPM | WEIGHT: 130 LBS | RESPIRATION RATE: 18 BRPM | SYSTOLIC BLOOD PRESSURE: 147 MMHG | OXYGEN SATURATION: 99 % | DIASTOLIC BLOOD PRESSURE: 77 MMHG

## 2018-12-03 DIAGNOSIS — C18.9 MALIGNANT NEOPLASM OF COLON, UNSPECIFIED PART OF COLON (HCC): ICD-10-CM

## 2018-12-03 DIAGNOSIS — C18.9 MALIGNANT NEOPLASM OF COLON, UNSPECIFIED PART OF COLON (HCC): Primary | ICD-10-CM

## 2018-12-03 LAB
ALBUMIN SERPL-MCNC: 3.51 G/DL (ref 3.2–4.8)
ALBUMIN/GLOB SERPL: 1.2 G/DL (ref 1.5–2.5)
ALP SERPL-CCNC: 100 U/L (ref 25–100)
ALT SERPL W P-5'-P-CCNC: 24 U/L (ref 7–40)
ANION GAP SERPL CALCULATED.3IONS-SCNC: 6 MMOL/L (ref 3–11)
AST SERPL-CCNC: 35 U/L (ref 0–33)
BILIRUB SERPL-MCNC: 0.6 MG/DL (ref 0.3–1.2)
BUN BLD-MCNC: 15 MG/DL (ref 9–23)
BUN/CREAT SERPL: 17.4 (ref 7–25)
CALCIUM SPEC-SCNC: 8.1 MG/DL (ref 8.7–10.4)
CHLORIDE SERPL-SCNC: 103 MMOL/L (ref 99–109)
CO2 SERPL-SCNC: 28 MMOL/L (ref 20–31)
CREAT BLD-MCNC: 0.86 MG/DL (ref 0.6–1.3)
CREAT BLDA-MCNC: 0.9 MG/DL (ref 0.6–1.3)
ERYTHROCYTE [DISTWIDTH] IN BLOOD BY AUTOMATED COUNT: 24.8 % (ref 11.3–14.5)
GFR SERPL CREATININE-BSD FRML MDRD: 64 ML/MIN/1.73
GLOBULIN UR ELPH-MCNC: 2.9 GM/DL
GLUCOSE BLD-MCNC: 97 MG/DL (ref 70–100)
HCT VFR BLD AUTO: 31.4 % (ref 34.5–44)
HGB BLD-MCNC: 10.7 G/DL (ref 11.5–15.5)
LYMPHOCYTES # BLD AUTO: 5.9 10*3/MM3 (ref 0.6–4.8)
LYMPHOCYTES NFR BLD AUTO: 60 % (ref 24–44)
MCH RBC QN AUTO: 32.9 PG (ref 27–31)
MCHC RBC AUTO-ENTMCNC: 34.3 G/DL (ref 32–36)
MCV RBC AUTO: 96 FL (ref 80–99)
MONOCYTES # BLD AUTO: 0.8 10*3/MM3 (ref 0–1)
MONOCYTES NFR BLD AUTO: 8.6 % (ref 0–12)
NEUTROPHILS # BLD AUTO: 3.1 10*3/MM3 (ref 1.5–8.3)
NEUTROPHILS NFR BLD AUTO: 31.4 % (ref 41–71)
PLATELET # BLD AUTO: 174 10*3/MM3 (ref 150–450)
PMV BLD AUTO: 7.6 FL (ref 6–12)
POTASSIUM BLD-SCNC: 3.7 MMOL/L (ref 3.5–5.5)
PROT SERPL-MCNC: 6.4 G/DL (ref 5.7–8.2)
RBC # BLD AUTO: 3.27 10*6/MM3 (ref 3.89–5.14)
SODIUM BLD-SCNC: 137 MMOL/L (ref 132–146)
WBC NRBC COR # BLD: 9.8 10*3/MM3 (ref 3.5–10.8)

## 2018-12-03 PROCEDURE — 96413 CHEMO IV INFUSION 1 HR: CPT

## 2018-12-03 PROCEDURE — 25010000002 PALONOSETRON PER 25 MCG: Performed by: INTERNAL MEDICINE

## 2018-12-03 PROCEDURE — 82565 ASSAY OF CREATININE: CPT

## 2018-12-03 PROCEDURE — 80053 COMPREHEN METABOLIC PANEL: CPT

## 2018-12-03 PROCEDURE — 25010000002 OXALIPLATIN PER 0.5 MG: Performed by: INTERNAL MEDICINE

## 2018-12-03 PROCEDURE — 96415 CHEMO IV INFUSION ADDL HR: CPT

## 2018-12-03 PROCEDURE — 85025 COMPLETE CBC W/AUTO DIFF WBC: CPT

## 2018-12-03 PROCEDURE — 96375 TX/PRO/DX INJ NEW DRUG ADDON: CPT

## 2018-12-03 PROCEDURE — 25010000002 DEXAMETHASONE PER 1 MG: Performed by: INTERNAL MEDICINE

## 2018-12-03 PROCEDURE — 99214 OFFICE O/P EST MOD 30 MIN: CPT | Performed by: NURSE PRACTITIONER

## 2018-12-03 RX ORDER — DEXTROSE MONOHYDRATE 50 MG/ML
250 INJECTION, SOLUTION INTRAVENOUS ONCE
Status: COMPLETED | OUTPATIENT
Start: 2018-12-03 | End: 2018-12-03

## 2018-12-03 RX ORDER — PALONOSETRON 0.05 MG/ML
0.25 INJECTION, SOLUTION INTRAVENOUS ONCE
Status: COMPLETED | OUTPATIENT
Start: 2018-12-03 | End: 2018-12-03

## 2018-12-03 RX ORDER — SODIUM CHLORIDE 0.9 % (FLUSH) 0.9 %
10 SYRINGE (ML) INJECTION AS NEEDED
Status: CANCELLED | OUTPATIENT
Start: 2018-12-03

## 2018-12-03 RX ORDER — ONDANSETRON HYDROCHLORIDE 8 MG/1
8 TABLET, FILM COATED ORAL EVERY 8 HOURS PRN
Qty: 60 TABLET | Refills: 5 | Status: SHIPPED | OUTPATIENT
Start: 2018-12-03 | End: 2019-10-22

## 2018-12-03 RX ADMIN — HEPARIN 500 UNITS: 100 SYRINGE at 15:29

## 2018-12-03 RX ADMIN — OXALIPLATIN 150 MG: 5 INJECTION, SOLUTION INTRAVENOUS at 13:12

## 2018-12-03 RX ADMIN — DEXTROSE MONOHYDRATE 250 ML: 50 INJECTION, SOLUTION INTRAVENOUS at 12:44

## 2018-12-03 RX ADMIN — DEXAMETHASONE SODIUM PHOSPHATE 12 MG: 4 INJECTION, SOLUTION INTRAMUSCULAR; INTRAVENOUS at 12:46

## 2018-12-03 RX ADMIN — PALONOSETRON HYDROCHLORIDE 0.25 MG: 0.25 INJECTION, SOLUTION INTRAVENOUS at 12:45

## 2018-12-03 NOTE — PROGRESS NOTES
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     HISTORY OF PRESENT ILLNESS: The patient is a very pleasant 78 y.o. female  with past medical history significant for colon cancer diagnosed 07/04/18. The patient presented to [facility] for gastritis and right lower quadrant pain.  This has been going on for the last few months associated with nausea but no vomiting.  She's been having one of dark stools.  Never had this problem before.  She had previous negative colonoscopy.  Colon polyp was found and biopsied. Surgical pathology 07/04/18 revealed invasive poorly differentiated adenocarcinoma in ascending colon. CT abdomen and pelvis 07/06/18 showed mass within the ascending colon with large mercy mass in the adjacent mesentery and prominent nodes around the cecum. The patient presented to Dr Juarez for elective right hemicolectomy on 08/01/18. Pathology 08/01/18 from colon and terminal ileum resection revealed poorly differentiated colonic adenocarcinoma ([id number]) with metastatic adenocarcinoma to 1 of 25 lymph nodes.  She was started on adjuvant chemotherapy using oxaliplatin with Xeloda September 25, 2018.  The patient is here today for follow up visit with treatment cycle #4.        SUBJECTIVE: The patient is here today with her daughter. She has been feeling fair. She complains of some nausea that has impaired her appetite. She did not receive her Zofran prescription from the pharmacy, and therefore has not taken anything for nausea at home. She has not vomited. She also complains of a skin rash to her arms and legs. It starts as red spots that are itchy, and then scab over. She complains of dryness to her skin. She has used topical hydrocortisone cream occasionally for itching. She is on her second week of Xeloda currently. She denies redness or peeling to her hands or feet. She denies diarrhea.    PAST  MEDICAL HISTORY/SOCIAL HISTORY/FAMILY HISTORY: Reviewed by me and unchanged from my documentation done on 09/17/18.    Review of Systems   Constitutional: Positive for appetite change, fatigue and unexpected weight change. Negative for activity change, chills and fever.   HENT: Negative for hearing loss, mouth sores, nosebleeds, sore throat and trouble swallowing.    Eyes: Negative for visual disturbance.   Respiratory: Negative for cough, chest tightness, shortness of breath and wheezing.    Cardiovascular: Negative for chest pain, palpitations and leg swelling.   Gastrointestinal: Positive for nausea. Negative for abdominal distention, abdominal pain, blood in stool, constipation, diarrhea, rectal pain and vomiting.   Endocrine: Negative for cold intolerance and heat intolerance.   Genitourinary: Negative for difficulty urinating, dysuria, frequency and urgency.   Musculoskeletal: Negative for arthralgias, back pain, gait problem, joint swelling and myalgias.   Skin: Positive for rash.   Neurological: Negative for dizziness, tremors, syncope, weakness, light-headedness, numbness and headaches.   Hematological: Negative for adenopathy. Does not bruise/bleed easily.   Psychiatric/Behavioral: Negative for confusion, sleep disturbance and suicidal ideas. The patient is not nervous/anxious.          Current Outpatient Medications:   •  atenolol (TENORMIN) 50 MG tablet, Take 50 mg by mouth Daily., Disp: , Rfl:   •  capecitabine (XELODA) 500 MG chemo tablet, Take 3 tab(s) by mouth in the AM and 3 tab(s) by mouth in the PM on days 1-14, then off for 7 days., Disp: 84 tablet, Rfl: 3  •  lidocaine-prilocaine (EMLA) 2.5-2.5 % cream, Apply  topically to the appropriate area as directed As Needed (45-60 minutes prior to port access.  Cover with saran/plastic wrap.)., Disp: 30 g, Rfl: 3  •  raNITIdine (ZANTAC) 150 MG tablet, Take 150 mg by mouth As Needed for Heartburn., Disp: , Rfl:     PHYSICAL EXAMINATION:   /77    "Pulse 70   Temp 98 °F (36.7 °C) (Temporal)   Resp 18   Ht 160 cm (62.99\")   Wt 59 kg (130 lb)   SpO2 99%   BMI 23.03 kg/m²    ECOG Performance Status: 1 - Symptomatic but completely ambulatory  General Appearance:  alert, cooperative, no apparent distress and appears stated age   Neurologic/Psychiatric: A&O x 3, gait steady, appropriate affect, strength 5/5 in all muscle groups   HEENT:  Normocephalic, without obvious abnormality, mucous membranes moist   Neck: Supple, symmetrical, trachea midline, no adenopathy;  No thyromegaly, masses, or tenderness   Lungs:   Clear to auscultation bilaterally; respirations regular, even, and unlabored bilaterally   Heart:  Regular rate and rhythm, no murmurs appreciated   Abdomen:   Soft, non-tender, non-distended and no organomegaly   Lymph nodes: No cervical, supraclavicular, inguinal or axillary adenopathy noted   Extremities: Normal, atraumatic; no clubbing, cyanosis, or edema    Skin: Macular erythematous lesions scattered to arms and legs, some with surrounding ecchymosis, several scabbed lesions to her lower legs.      No visits with results within 2 Week(s) from this visit.   Latest known visit with results is:   Infusion on 11/12/2018   Component Date Value Ref Range Status   • Glucose 11/12/2018 96  70 - 100 mg/dL Final   • BUN 11/12/2018 12  9 - 23 mg/dL Final   • Creatinine 11/12/2018 0.69  0.60 - 1.30 mg/dL Final   • Sodium 11/12/2018 135  132 - 146 mmol/L Final   • Potassium 11/12/2018 3.4* 3.5 - 5.5 mmol/L Final   • Chloride 11/12/2018 98* 99 - 109 mmol/L Final   • CO2 11/12/2018 32.0* 20.0 - 31.0 mmol/L Final   • Calcium 11/12/2018 7.9* 8.7 - 10.4 mg/dL Final   • Total Protein 11/12/2018 5.8  5.7 - 8.2 g/dL Final   • Albumin 11/12/2018 3.22  3.20 - 4.80 g/dL Final   • ALT (SGPT) 11/12/2018 26  7 - 40 U/L Final   • AST (SGOT) 11/12/2018 26  0 - 33 U/L Final   • Alkaline Phosphatase 11/12/2018 121* 25 - 100 U/L Final   • Total Bilirubin 11/12/2018 0.4  0.3 - " 1.2 mg/dL Final   • eGFR Non African Amer 11/12/2018 82  >60 mL/min/1.73 Final   • Globulin 11/12/2018 2.6  gm/dL Final   • A/G Ratio 11/12/2018 1.2* 1.5 - 2.5 g/dL Final   • BUN/Creatinine Ratio 11/12/2018 17.4  7.0 - 25.0 Final   • Anion Gap 11/12/2018 5.0  3.0 - 11.0 mmol/L Final   • WBC 11/12/2018 10.20  3.50 - 10.80 10*3/mm3 Final   • RBC 11/12/2018 3.53* 3.89 - 5.14 10*6/mm3 Final   • Hemoglobin 11/12/2018 11.1* 11.5 - 15.5 g/dL Final   • Hematocrit 11/12/2018 33.3* 34.5 - 44.0 % Final   • RDW 11/12/2018 21.9* 11.3 - 14.5 % Final   • MCV 11/12/2018 94.2  80.0 - 99.0 fL Final   • MCH 11/12/2018 31.3* 27.0 - 31.0 pg Final   • MCHC 11/12/2018 33.2  32.0 - 36.0 g/dL Final   • MPV 11/12/2018 7.5  6.0 - 12.0 fL Final   • Platelets 11/12/2018 248  150 - 450 10*3/mm3 Final   • Neutrophil % 11/12/2018 40.0* 41.0 - 71.0 % Final   • Lymphocyte % 11/12/2018 52.5* 24.0 - 44.0 % Final   • Monocyte % 11/12/2018 7.5  0.0 - 12.0 % Final   • Neutrophils, Absolute 11/12/2018 4.10  1.50 - 8.30 10*3/mm3 Final   • Lymphocytes, Absolute 11/12/2018 5.40* 0.60 - 4.80 10*3/mm3 Final   • Monocytes, Absolute 11/12/2018 0.80  0.00 - 1.00 10*3/mm3 Final   • Creatinine 11/12/2018 0.80  0.60 - 1.30 mg/dL Final    Serial Number: [id number]:  [id number]        No results found.    ASSESSMENT: The patient is a very pleasant 78 y.o. 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  Ascending colon adenocarcinoma T3 N1 M0 stage IIIa:  A.  Presenting with abdominal pain and irregular bowel movements  B.  Diagnosed after colonoscopy with a biopsy done on June 27, 2018  C.  Status post right hemicolectomy done by Dr. Wong August 01, 2018  D.  Final pathology revealed with 1 out of 25 positive mesenteric lymph nodes  E. Started adjuvant chemotherapy using Xeloda with oxaliplatin September 25, 2018, status post 3 cycles  E. CEA normal at diagnosis  2.  Hypertension  3.  Normocytic anemia  4.   Two hypodense right hepatic lobe liver lesions.  5.  Chemotherapy-induced nausea  6.  Chemotherapy used diarrhea  7. Chemotherapy induced skin rash.    PLAN:   1. I will proceed with treatment as scheduled today Xeloda with oxaliplatin cycle #4.    2.  The patient will follow-up with me in 3 weeks for cycle #5 out of 8 planned.  3.  I will monitor patient blood work including blood counts kidney function liver enzymes and electrolytes.  4.  I will repeat patient's scans in 3 months to follow-up on 2 small right hepatic lobe liver lesions.  This would be due before cycle #5, and ordered for prior to return.  5.  I reviewed potential side effects of treatment including diarrhea and skin rash as well as peripheral neuropathy.  6.  I will start the patient on Zofran as needed for chemotherapy induced nausea. She was given a prescription for this today.   7.  We will monitor patient blood pressure while she is on active chemotherapy. We will adjust the dosing of her antihypertensives if needed while on active cancer treatment.   8.  We'll continue routine port care with each infusion.  9. Regarding the patient's skin rash, we will add Benadryl at bedtime as needed for itching, as well as daily use of Claritin. She will also try topical hydrocortisone cream and benadryl cream as needed for itching. I told her to keep her skin moisturized. I advised the patient to monitor her symptoms, and if they do not improve, for her to call us for consideration of adding Doxycycline versus dose reduction of her Xeloda. I discussed her symptoms with Dr. Coleman who agreed to this plan for management.   10. We will consider adding Megace if needed for poor appetite and weight loss. The patient will continue supplement with Ensure twice daily.     Liana Cervantes, APRN  12/3/2018

## 2018-12-04 ENCOUNTER — SPECIALTY PHARMACY (OUTPATIENT)
Dept: ONCOLOGY | Facility: HOSPITAL | Age: 78
End: 2018-12-04

## 2018-12-04 DIAGNOSIS — C18.9 MALIGNANT NEOPLASM OF COLON, UNSPECIFIED PART OF COLON (HCC): ICD-10-CM

## 2018-12-04 DIAGNOSIS — C18.9 MALIGNANT NEOPLASM OF COLON, UNSPECIFIED PART OF COLON (HCC): Primary | ICD-10-CM

## 2018-12-04 RX ORDER — CAPECITABINE 500 MG/1
TABLET, FILM COATED ORAL
Qty: 84 TABLET | Refills: 3 | Status: SHIPPED | OUTPATIENT
Start: 2018-12-04 | End: 2018-12-28 | Stop reason: SDUPTHER

## 2018-12-04 RX ORDER — CAPECITABINE 500 MG/1
TABLET, FILM COATED ORAL
Qty: 84 TABLET | Refills: 0 | OUTPATIENT
Start: 2018-12-04

## 2018-12-28 ENCOUNTER — SPECIALTY PHARMACY (OUTPATIENT)
Dept: ONCOLOGY | Facility: HOSPITAL | Age: 78
End: 2018-12-28

## 2018-12-28 ENCOUNTER — OFFICE VISIT (OUTPATIENT)
Dept: ONCOLOGY | Facility: CLINIC | Age: 78
End: 2018-12-28

## 2018-12-28 ENCOUNTER — HOSPITAL ENCOUNTER (OUTPATIENT)
Dept: ONCOLOGY | Facility: HOSPITAL | Age: 78
Setting detail: INFUSION SERIES
Discharge: HOME | End: 2018-12-28
Attending: INTERNAL MEDICINE

## 2018-12-28 ENCOUNTER — HOSPITAL ENCOUNTER (OUTPATIENT)
Dept: CT IMAGING | Facility: HOSPITAL | Age: 78
Discharge: HOME | End: 2018-12-28
Admitting: NURSE PRACTITIONER

## 2018-12-28 VITALS
HEART RATE: 77 BPM | BODY MASS INDEX: 23.92 KG/M2 | SYSTOLIC BLOOD PRESSURE: 166 MMHG | HEIGHT: 63 IN | RESPIRATION RATE: 16 BRPM | WEIGHT: 135 LBS | TEMPERATURE: 97.8 F | DIASTOLIC BLOOD PRESSURE: 77 MMHG

## 2018-12-28 VITALS
HEART RATE: 77 BPM | TEMPERATURE: 97.8 F | OXYGEN SATURATION: 99 % | SYSTOLIC BLOOD PRESSURE: 166 MMHG | RESPIRATION RATE: 16 BRPM | BODY MASS INDEX: 23.92 KG/M2 | HEIGHT: 63 IN | WEIGHT: 135 LBS | DIASTOLIC BLOOD PRESSURE: 77 MMHG

## 2018-12-28 DIAGNOSIS — C18.9 MALIGNANT NEOPLASM OF COLON, UNSPECIFIED PART OF COLON (HCC): ICD-10-CM

## 2018-12-28 DIAGNOSIS — C18.9 MALIGNANT NEOPLASM OF COLON, UNSPECIFIED PART OF COLON (HCC): Primary | ICD-10-CM

## 2018-12-28 LAB
ALBUMIN SERPL-MCNC: 3.78 G/DL (ref 3.2–4.8)
ALBUMIN/GLOB SERPL: 1.2 G/DL (ref 1.5–2.5)
ALP SERPL-CCNC: 100 U/L (ref 25–100)
ALT SERPL W P-5'-P-CCNC: 17 U/L (ref 7–40)
ANION GAP SERPL CALCULATED.3IONS-SCNC: 7 MMOL/L (ref 3–11)
AST SERPL-CCNC: 32 U/L (ref 0–33)
BILIRUB SERPL-MCNC: 0.5 MG/DL (ref 0.3–1.2)
BUN BLD-MCNC: 9 MG/DL (ref 9–23)
BUN/CREAT SERPL: 12.2 (ref 7–25)
CALCIUM SPEC-SCNC: 8.6 MG/DL (ref 8.7–10.4)
CHLORIDE SERPL-SCNC: 105 MMOL/L (ref 99–109)
CO2 SERPL-SCNC: 26 MMOL/L (ref 20–31)
CREAT BLD-MCNC: 0.74 MG/DL (ref 0.6–1.3)
ERYTHROCYTE [DISTWIDTH] IN BLOOD BY AUTOMATED COUNT: 26.8 % (ref 11.3–14.5)
GFR SERPL CREATININE-BSD FRML MDRD: 76 ML/MIN/1.73
GLOBULIN UR ELPH-MCNC: 3.1 GM/DL
GLUCOSE BLD-MCNC: 96 MG/DL (ref 70–100)
HCT VFR BLD AUTO: 31.7 % (ref 34.5–44)
HGB BLD-MCNC: 10.6 G/DL (ref 11.5–15.5)
LYMPHOCYTES # BLD AUTO: 6.7 10*3/MM3 (ref 0.6–4.8)
LYMPHOCYTES NFR BLD AUTO: 55.6 % (ref 24–44)
MCH RBC QN AUTO: 35.3 PG (ref 27–31)
MCHC RBC AUTO-ENTMCNC: 33.6 G/DL (ref 32–36)
MCV RBC AUTO: 105.1 FL (ref 80–99)
MONOCYTES # BLD AUTO: 0.7 10*3/MM3 (ref 0–1)
MONOCYTES NFR BLD AUTO: 5.8 % (ref 0–12)
NEUTROPHILS # BLD AUTO: 4.6 10*3/MM3 (ref 1.5–8.3)
NEUTROPHILS NFR BLD AUTO: 38.6 % (ref 41–71)
PLATELET # BLD AUTO: 146 10*3/MM3 (ref 150–450)
PMV BLD AUTO: 8 FL (ref 6–12)
POTASSIUM BLD-SCNC: 4.1 MMOL/L (ref 3.5–5.5)
PROT SERPL-MCNC: 6.9 G/DL (ref 5.7–8.2)
RBC # BLD AUTO: 3.01 10*6/MM3 (ref 3.89–5.14)
SODIUM BLD-SCNC: 138 MMOL/L (ref 132–146)
WBC NRBC COR # BLD: 12 10*3/MM3 (ref 3.5–10.8)

## 2018-12-28 PROCEDURE — 36593 DECLOT VASCULAR DEVICE: CPT

## 2018-12-28 PROCEDURE — 25010000002 DEXAMETHASONE PER 1 MG: Performed by: NURSE PRACTITIONER

## 2018-12-28 PROCEDURE — 96375 TX/PRO/DX INJ NEW DRUG ADDON: CPT

## 2018-12-28 PROCEDURE — 25010000002 OXALIPLATIN PER 0.5 MG: Performed by: NURSE PRACTITIONER

## 2018-12-28 PROCEDURE — 25010000002 PALONOSETRON PER 25 MCG: Performed by: NURSE PRACTITIONER

## 2018-12-28 PROCEDURE — 96415 CHEMO IV INFUSION ADDL HR: CPT

## 2018-12-28 PROCEDURE — 25010000002 ALTEPLASE 2 MG RECONSTITUTED SOLUTION: Performed by: INTERNAL MEDICINE

## 2018-12-28 PROCEDURE — 74177 CT ABD & PELVIS W/CONTRAST: CPT

## 2018-12-28 PROCEDURE — 71260 CT THORAX DX C+: CPT

## 2018-12-28 PROCEDURE — 80053 COMPREHEN METABOLIC PANEL: CPT | Performed by: INTERNAL MEDICINE

## 2018-12-28 PROCEDURE — 96413 CHEMO IV INFUSION 1 HR: CPT

## 2018-12-28 PROCEDURE — 99214 OFFICE O/P EST MOD 30 MIN: CPT | Performed by: NURSE PRACTITIONER

## 2018-12-28 PROCEDURE — 85025 COMPLETE CBC W/AUTO DIFF WBC: CPT | Performed by: INTERNAL MEDICINE

## 2018-12-28 PROCEDURE — 25010000002 IOPAMIDOL 61 % SOLUTION: Performed by: NURSE PRACTITIONER

## 2018-12-28 RX ORDER — SODIUM CHLORIDE 0.9 % (FLUSH) 0.9 %
10 SYRINGE (ML) INJECTION AS NEEDED
Status: DISCONTINUED | OUTPATIENT
Start: 2018-12-28 | End: 2018-12-29 | Stop reason: HOSPADM

## 2018-12-28 RX ORDER — PALONOSETRON 0.05 MG/ML
0.25 INJECTION, SOLUTION INTRAVENOUS ONCE
Status: CANCELLED | OUTPATIENT
Start: 2018-12-28

## 2018-12-28 RX ORDER — DEXTROSE MONOHYDRATE 50 MG/ML
250 INJECTION, SOLUTION INTRAVENOUS ONCE
Status: COMPLETED | OUTPATIENT
Start: 2018-12-28 | End: 2018-12-28

## 2018-12-28 RX ORDER — SODIUM CHLORIDE 0.9 % (FLUSH) 0.9 %
10 SYRINGE (ML) INJECTION AS NEEDED
Status: CANCELLED | OUTPATIENT
Start: 2018-12-28

## 2018-12-28 RX ORDER — PALONOSETRON 0.05 MG/ML
0.25 INJECTION, SOLUTION INTRAVENOUS ONCE
Status: COMPLETED | OUTPATIENT
Start: 2018-12-28 | End: 2018-12-28

## 2018-12-28 RX ORDER — CAPECITABINE 500 MG/1
TABLET, FILM COATED ORAL
Qty: 56 TABLET | Refills: 3 | Status: SHIPPED | OUTPATIENT
Start: 2018-12-28 | End: 2019-01-18

## 2018-12-28 RX ORDER — DEXTROSE MONOHYDRATE 50 MG/ML
250 INJECTION, SOLUTION INTRAVENOUS ONCE
Status: CANCELLED | OUTPATIENT
Start: 2018-12-28

## 2018-12-28 RX ADMIN — ALTEPLASE: 2.2 INJECTION, POWDER, LYOPHILIZED, FOR SOLUTION INTRAVENOUS at 10:05

## 2018-12-28 RX ADMIN — DEXTROSE MONOHYDRATE 250 ML: 50 INJECTION, SOLUTION INTRAVENOUS at 12:42

## 2018-12-28 RX ADMIN — SODIUM CHLORIDE, PRESERVATIVE FREE 10 ML: 5 INJECTION INTRAVENOUS at 15:09

## 2018-12-28 RX ADMIN — OXALIPLATIN 150 MG: 5 INJECTION, SOLUTION INTRAVENOUS at 13:04

## 2018-12-28 RX ADMIN — IOPAMIDOL 85 ML: 612 INJECTION, SOLUTION INTRAVENOUS at 09:14

## 2018-12-28 RX ADMIN — DEXAMETHASONE SODIUM PHOSPHATE 4 MG: 4 INJECTION, SOLUTION INTRAMUSCULAR; INTRAVENOUS at 12:45

## 2018-12-28 RX ADMIN — SODIUM CHLORIDE, PRESERVATIVE FREE 500 UNITS: 5 INJECTION INTRAVENOUS at 15:09

## 2018-12-28 RX ADMIN — PALONOSETRON HYDROCHLORIDE 0.25 MG: 0.25 INJECTION, SOLUTION INTRAVENOUS at 12:42

## 2018-12-28 NOTE — PROGRESS NOTES
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     HISTORY OF PRESENT ILLNESS: The patient is a very pleasant 78 y.o. female  with past medical history significant for colon cancer diagnosed 07/04/18. The patient presented to [facility] for gastritis and right lower quadrant pain.  This has been going on for the last few months associated with nausea but no vomiting.  She's been having one of dark stools.  Never had this problem before.  She had previous negative colonoscopy.  Colon polyp was found and biopsied. Surgical pathology 07/04/18 revealed invasive poorly differentiated adenocarcinoma in ascending colon. CT abdomen and pelvis 07/06/18 showed mass within the ascending colon with large mercy mass in the adjacent mesentery and prominent nodes around the cecum. The patient presented to Dr Juarez for elective right hemicolectomy on 08/01/18. Pathology 08/01/18 from colon and terminal ileum resection revealed poorly differentiated colonic adenocarcinoma ([id number]) with metastatic adenocarcinoma to 1 of 25 lymph nodes.  She was started on adjuvant chemotherapy using oxaliplatin with Xeloda September 25, 2018.  The patient is here today for follow up visit with treatment cycle #5.        SUBJECTIVE: The patient is here today with her daughter. She complains of increased skin issues related to her Xeloda. She is now having tenderness to her hands and feet as well as some mild peeling and blisters to her big toes. She is on her off week of Xeloda currently, due to start back Monday. She is using bag balm to her hands and feet daily. She is trying to be careful regarding exposure to extreme temperatures. Her nausea has improved with use of Zofran. She is eating and drinking more, and has actually gained some weight since her last visit. She has had no fever or chills.      PAST MEDICAL HISTORY/SOCIAL  HISTORY/FAMILY HISTORY: Reviewed by me and unchanged from my documentation done on 09/17/18.    Review of Systems   Constitutional: Positive for fatigue. Negative for activity change, appetite change, chills, fever and unexpected weight change.   HENT: Negative for hearing loss, mouth sores, nosebleeds, sore throat and trouble swallowing.    Eyes: Negative for visual disturbance.   Respiratory: Negative for cough, chest tightness, shortness of breath and wheezing.    Cardiovascular: Negative for chest pain, palpitations and leg swelling.   Gastrointestinal: Negative for abdominal distention, abdominal pain, blood in stool, constipation, diarrhea, nausea, rectal pain and vomiting.   Endocrine: Negative for cold intolerance and heat intolerance.   Genitourinary: Negative for difficulty urinating, dysuria, frequency and urgency.   Musculoskeletal: Negative for arthralgias, back pain, gait problem, joint swelling and myalgias.   Skin: Positive for rash.        Soreness to hands and feels, peeling and blisters   Neurological: Negative for dizziness, tremors, syncope, weakness, light-headedness, numbness and headaches.   Hematological: Negative for adenopathy. Does not bruise/bleed easily.   Psychiatric/Behavioral: Negative for confusion, sleep disturbance and suicidal ideas. The patient is not nervous/anxious.          Current Outpatient Medications:   •  atenolol (TENORMIN) 50 MG tablet, Take 50 mg by mouth Daily., Disp: , Rfl:   •  capecitabine (XELODA) 500 MG chemo tablet, Take 3 tab(s) by mouth in the AM and 3 tab(s) by mouth in the PM on days 1-14, then off for 7 days., Disp: 84 tablet, Rfl: 3  •  lidocaine-prilocaine (EMLA) 2.5-2.5 % cream, Apply  topically to the appropriate area as directed As Needed (45-60 minutes prior to port access.  Cover with saran/plastic wrap.)., Disp: 30 g, Rfl: 3  •  ondansetron (ZOFRAN) 8 MG tablet, Take 1 tablet by mouth Every 8 (Eight) Hours As Needed for Nausea., Disp: 60 tablet,  "Rfl: 5  •  raNITIdine (ZANTAC) 150 MG tablet, Take 150 mg by mouth As Needed for Heartburn., Disp: , Rfl:   No current facility-administered medications for this visit.     Facility-Administered Medications Ordered in Other Visits:   •  heparin flush (porcine) 100 UNIT/ML injection 500 Units, 500 Units, Intravenous, PRN, Schuyler Coleman MD  •  sodium chloride 0.9 % flush 10 mL, 10 mL, Intravenous, PRN, Schuyler Coleman MD    PHYSICAL EXAMINATION:   /77   Pulse 77   Temp 97.8 °F (36.6 °C) (Temporal)   Resp 16   Ht 160 cm (62.99\")   Wt 61.2 kg (135 lb)   SpO2 99%   BMI 23.92 kg/m²    ECOG Performance Status: 1 - Symptomatic but completely ambulatory  General Appearance:  alert, cooperative, no apparent distress and appears stated age   Neurologic/Psychiatric: A&O x 3, gait steady, appropriate affect, strength 5/5 in all muscle groups   HEENT:  Normocephalic, without obvious abnormality, mucous membranes moist   Neck: Supple, symmetrical, trachea midline, no adenopathy;  No thyromegaly, masses, or tenderness   Lungs:   Clear to auscultation bilaterally; respirations regular, even, and unlabored bilaterally   Heart:  Regular rate and rhythm, no murmurs appreciated   Abdomen:   Soft, non-tender, non-distended and no organomegaly   Lymph nodes: No cervical, supraclavicular, inguinal or axillary adenopathy noted   Extremities: Normal, atraumatic; no clubbing, cyanosis, or edema    Skin: Macular erythematous lesions scattered to arms and legs, some with surrounding ecchymosis, several scabbed lesions to her lower legs.      Hospital Outpatient Visit on 12/28/2018   Component Date Value Ref Range Status   • Glucose 12/28/2018 96  70 - 100 mg/dL Final   • BUN 12/28/2018 9  9 - 23 mg/dL Final   • Creatinine 12/28/2018 0.74  0.60 - 1.30 mg/dL Final   • Sodium 12/28/2018 138  132 - 146 mmol/L Final   • Potassium 12/28/2018 4.1  3.5 - 5.5 mmol/L Final   • Chloride 12/28/2018 105  99 - 109 mmol/L Final   • CO2 " 12/28/2018 26.0  20.0 - 31.0 mmol/L Final   • Calcium 12/28/2018 8.6* 8.7 - 10.4 mg/dL Final   • Total Protein 12/28/2018 6.9  5.7 - 8.2 g/dL Final   • Albumin 12/28/2018 3.78  3.20 - 4.80 g/dL Final   • ALT (SGPT) 12/28/2018 17  7 - 40 U/L Final   • AST (SGOT) 12/28/2018 32  0 - 33 U/L Final   • Alkaline Phosphatase 12/28/2018 100  25 - 100 U/L Final   • Total Bilirubin 12/28/2018 0.5  0.3 - 1.2 mg/dL Final   • eGFR Non African Amer 12/28/2018 76  >60 mL/min/1.73 Final   • Globulin 12/28/2018 3.1  gm/dL Final   • A/G Ratio 12/28/2018 1.2* 1.5 - 2.5 g/dL Final   • BUN/Creatinine Ratio 12/28/2018 12.2  7.0 - 25.0 Final   • Anion Gap 12/28/2018 7.0  3.0 - 11.0 mmol/L Final   • WBC 12/28/2018 12.00* 3.50 - 10.80 10*3/mm3 Final   • RBC 12/28/2018 3.01* 3.89 - 5.14 10*6/mm3 Final   • Hemoglobin 12/28/2018 10.6* 11.5 - 15.5 g/dL Final   • Hematocrit 12/28/2018 31.7* 34.5 - 44.0 % Final   • RDW 12/28/2018 26.8* 11.3 - 14.5 % Final   • MCV 12/28/2018 105.1* 80.0 - 99.0 fL Final   • MCH 12/28/2018 35.3* 27.0 - 31.0 pg Final   • MCHC 12/28/2018 33.6  32.0 - 36.0 g/dL Final   • MPV 12/28/2018 8.0  6.0 - 12.0 fL Final   • Platelets 12/28/2018 146* 150 - 450 10*3/mm3 Final   • Neutrophil % 12/28/2018 38.6* 41.0 - 71.0 % Final   • Lymphocyte % 12/28/2018 55.6* 24.0 - 44.0 % Final   • Monocyte % 12/28/2018 5.8  0.0 - 12.0 % Final   • Neutrophils, Absolute 12/28/2018 4.60  1.50 - 8.30 10*3/mm3 Final   • Lymphocytes, Absolute 12/28/2018 6.70* 0.60 - 4.80 10*3/mm3 Final   • Monocytes, Absolute 12/28/2018 0.70  0.00 - 1.00 10*3/mm3 Final        Ct Chest With Contrast    Result Date: 12/28/2018  Narrative: EXAMINATION: CT CHEST WITH CONTRAST-, CT ABDOMEN AND PELVIS WITH CONTRAST-12/28/2018:  INDICATION: Follow-up scan; C18.9-Malignant neoplasm of colon, unspecified, followup colon cancer with chemotherapy.  TECHNIQUE: Multiple axial CT imaging was obtained of the abdomen and pelvis from the lung bases through the pubic symphysis  following the administration of intravenous contrast.  The radiation dose reduction device was turned on for each scan per the ALARA (As Low as Reasonably Achievable) protocol.  COMPARISON: 09/25/2018.  FINDINGS:  CHEST: The thyroid is homogeneous. No mediastinal mass or adenopathy. The cardiac chambers are within normal limits. No pericardial effusion. No bulky hilar or axillary lymphadenopathy. Calcified granuloma identified within the left lower lobe. Also calcified granuloma identified in the left upper lobe. The lung parenchyma is otherwise clear. No parenchymal consolidation, pulmonary mass or nodule. No pleural effusion or pneumothorax. Degenerative changes seen within the spine.  ABDOMEN: The liver is homogeneous in appearance. Mild distention identified of the common bile duct. Surgical clips seen in the right upper quadrant from prior cholecystectomy. Cysts seen in both kidneys. The spleen is homogeneous. The abdominal portions of the gastrointestinal tract are within normal limits. The pancreas is homogeneous. No abnormal mass or fluid collection is identified. No free fluid or free air.  PELVIS: The pelvic organs are unremarkable. The pelvic portions of the gastrointestinal tract are within normal limits. Surgical clips seen in the right lower quadrant. Nonrotation of the right kidney. No free fluid or free air. No abnormal mass or fluid collection is identified.  Delayed imaging reveals contrast seen in the renal collecting systems bilaterally as well as within the ureters and bladder. There is no evidence of obstruction. Small right inguinal hernia containing bowel and fat. The bony structures reveal no evidence of osseous abnormality.      Impression: There is no evidence of metastatic or recurrent disease. Distention seen of the common bile duct with removal of the gallbladder. No obstruction of the kidneys. Postsurgical changes seen in the right lower quadrant with right inguinal hernia with no  evidence of strangulation. The findings are stable.  D:  12/28/2018 E:  12/28/2018    This report was finalized on 12/28/2018 11:29 AM by Dr. Eden Grant MD.      Ct Abdomen Pelvis With Contrast    Result Date: 12/28/2018  Narrative: EXAMINATION: CT CHEST WITH CONTRAST-, CT ABDOMEN AND PELVIS WITH CONTRAST-12/28/2018:  INDICATION: Follow-up scan; C18.9-Malignant neoplasm of colon, unspecified, followup colon cancer with chemotherapy.  TECHNIQUE: Multiple axial CT imaging was obtained of the abdomen and pelvis from the lung bases through the pubic symphysis following the administration of intravenous contrast.  The radiation dose reduction device was turned on for each scan per the ALARA (As Low as Reasonably Achievable) protocol.  COMPARISON: 09/25/2018.  FINDINGS:  CHEST: The thyroid is homogeneous. No mediastinal mass or adenopathy. The cardiac chambers are within normal limits. No pericardial effusion. No bulky hilar or axillary lymphadenopathy. Calcified granuloma identified within the left lower lobe. Also calcified granuloma identified in the left upper lobe. The lung parenchyma is otherwise clear. No parenchymal consolidation, pulmonary mass or nodule. No pleural effusion or pneumothorax. Degenerative changes seen within the spine.  ABDOMEN: The liver is homogeneous in appearance. Mild distention identified of the common bile duct. Surgical clips seen in the right upper quadrant from prior cholecystectomy. Cysts seen in both kidneys. The spleen is homogeneous. The abdominal portions of the gastrointestinal tract are within normal limits. The pancreas is homogeneous. No abnormal mass or fluid collection is identified. No free fluid or free air.  PELVIS: The pelvic organs are unremarkable. The pelvic portions of the gastrointestinal tract are within normal limits. Surgical clips seen in the right lower quadrant. Nonrotation of the right kidney. No free fluid or free air. No abnormal mass or fluid  collection is identified.  Delayed imaging reveals contrast seen in the renal collecting systems bilaterally as well as within the ureters and bladder. There is no evidence of obstruction. Small right inguinal hernia containing bowel and fat. The bony structures reveal no evidence of osseous abnormality.      Impression: There is no evidence of metastatic or recurrent disease. Distention seen of the common bile duct with removal of the gallbladder. No obstruction of the kidneys. Postsurgical changes seen in the right lower quadrant with right inguinal hernia with no evidence of strangulation. The findings are stable.  D:  12/28/2018 E:  12/28/2018    This report was finalized on 12/28/2018 11:29 AM by Dr. Eden Grant MD.        ASSESSMENT: The patient is a very pleasant 78 y.o. 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  Ascending colon adenocarcinoma T3 N1 M0 stage IIIa:  A.  Presenting with abdominal pain and irregular bowel movements  B.  Diagnosed after colonoscopy with a biopsy done on June 27, 2018  C.  Status post right hemicolectomy done by Dr. Wong August 01, 2018  D.  Final pathology revealed with 1 out of 25 positive mesenteric lymph nodes  E. Started adjuvant chemotherapy using Xeloda with oxaliplatin September 25, 2018, status post 3 cycles  E. CEA normal at diagnosis  2.  Hypertension  3.  Normocytic anemia  4.  Two hypodense right hepatic lobe liver lesions.  5.  Chemotherapy-induced nausea  6.  Chemotherapy used diarrhea  7. Hand-foot syndrome    PLAN:   1. I will proceed with treatment as scheduled today with oxaliplatin cycle #5.    2. We will change the dosing of Xeloda to 1000 mg twice a day, 2 weeks on, 1 week off. She will hold off on starting treatment for 1 week secondary to hand-foot syndrome.   3. I asked her to call us next week if her symptoms have not improved following treatment delay with Xeloda.   4.  I will  monitor patient blood work including blood counts kidney function liver enzymes and electrolytes.  5. I reviewed the CAT scan results with the patient and her daughter. I reviewed the images myself. I told the patient she has no evidence of any new or progressive disease. The hepatic lesions in the liver noted on previous CAT scan were stable and benign appearing.   6. She will continue Zofran as needed for chemotherapy induced nausea.   7. I encouraged the patient to continue to moisturize her skin secondary to dermatitis.   8.  I reviewed potential side effects of treatment including diarrhea and skin rash as well as peripheral neuropathy.  9.  We will monitor patient blood pressure while she is on active chemotherapy. We will adjust the dosing of her antihypertensives if needed while on active cancer treatment.   10.  We'll continue routine port care with each infusion.  11. We will consider adding Megace if needed for poor appetite and weight loss. The patient will continue supplement with Ensure twice daily.     Liana Cervantes, APRN  12/28/2018

## 2018-12-28 NOTE — PROGRESS NOTES
Oral Chemotherapy Teaching      Patient Name/:  Su Pedraza   1940  Oral Chemotherapy Regimen:  Capecitabine 1000mg PO BID 14 days on/7 days off + oxaliplatin  Date Started Medication: Cycle 5: 2019     Additional Notes:  Reducing dosage of capecitabine from 1500mg PO BID 14 days on/7 days off to 1000mg PO BID 14 days on/7 days off due to HFS.  Patient will also be delaying this cycle by 1 week due to side effects.  E-scribed a new RX to [facility] to begin processing.

## 2019-01-09 ENCOUNTER — TELEPHONE (OUTPATIENT)
Dept: ONCOLOGY | Facility: CLINIC | Age: 79
End: 2019-01-09

## 2019-01-10 ENCOUNTER — TELEPHONE (OUTPATIENT)
Dept: ONCOLOGY | Facility: CLINIC | Age: 79
End: 2019-01-10

## 2019-01-10 NOTE — TELEPHONE ENCOUNTER
Patient called stating that she resumed the Xeloda on Monday, and on Tuesday she noticed she was breaking out in hives. Yesterday, she woke up and her eye was swollen shut. No respiratory symptoms. The only change she has made recently was starting back on the Xeloda pills. Discussed with Dr Coleman, and he advises patient to stop the Xeloda again until she is seen next week. May continue benadryl in the meantime, and advised to call back if symptoms do not improve or if they worsen.

## 2019-01-17 DIAGNOSIS — C18.9 MALIGNANT NEOPLASM OF COLON, UNSPECIFIED PART OF COLON (HCC): ICD-10-CM

## 2019-01-18 ENCOUNTER — HOSPITAL ENCOUNTER (OUTPATIENT)
Dept: ONCOLOGY | Facility: HOSPITAL | Age: 79
Setting detail: INFUSION SERIES
Discharge: HOME | End: 2019-01-18

## 2019-01-18 ENCOUNTER — OFFICE VISIT (OUTPATIENT)
Dept: ONCOLOGY | Facility: CLINIC | Age: 79
End: 2019-01-18

## 2019-01-18 VITALS
TEMPERATURE: 98.6 F | HEART RATE: 79 BPM | HEIGHT: 63 IN | DIASTOLIC BLOOD PRESSURE: 84 MMHG | WEIGHT: 132 LBS | SYSTOLIC BLOOD PRESSURE: 174 MMHG | RESPIRATION RATE: 12 BRPM | BODY MASS INDEX: 23.39 KG/M2 | OXYGEN SATURATION: 99 %

## 2019-01-18 DIAGNOSIS — C18.9 MALIGNANT NEOPLASM OF COLON, UNSPECIFIED PART OF COLON (HCC): Primary | ICD-10-CM

## 2019-01-18 PROCEDURE — 96523 IRRIG DRUG DELIVERY DEVICE: CPT

## 2019-01-18 PROCEDURE — 99214 OFFICE O/P EST MOD 30 MIN: CPT | Performed by: INTERNAL MEDICINE

## 2019-01-18 RX ORDER — DEXTROSE MONOHYDRATE 50 MG/ML
250 INJECTION, SOLUTION INTRAVENOUS ONCE
Status: CANCELLED | OUTPATIENT
Start: 2019-02-25

## 2019-01-18 RX ORDER — PALONOSETRON 0.05 MG/ML
0.25 INJECTION, SOLUTION INTRAVENOUS ONCE
Status: CANCELLED | OUTPATIENT
Start: 2019-01-18

## 2019-01-18 RX ORDER — SODIUM CHLORIDE 0.9 % (FLUSH) 0.9 %
10 SYRINGE (ML) INJECTION AS NEEDED
Status: CANCELLED | OUTPATIENT
Start: 2019-01-18

## 2019-01-18 RX ORDER — PALONOSETRON 0.05 MG/ML
0.25 INJECTION, SOLUTION INTRAVENOUS ONCE
Status: CANCELLED | OUTPATIENT
Start: 2019-02-04

## 2019-01-18 RX ORDER — PALONOSETRON 0.05 MG/ML
0.25 INJECTION, SOLUTION INTRAVENOUS ONCE
Status: CANCELLED | OUTPATIENT
Start: 2019-02-25

## 2019-01-18 RX ORDER — DEXTROSE MONOHYDRATE 50 MG/ML
250 INJECTION, SOLUTION INTRAVENOUS ONCE
Status: CANCELLED | OUTPATIENT
Start: 2019-01-18

## 2019-01-18 RX ORDER — DEXTROSE MONOHYDRATE 50 MG/ML
250 INJECTION, SOLUTION INTRAVENOUS ONCE
Status: CANCELLED | OUTPATIENT
Start: 2019-02-04

## 2019-01-18 RX ADMIN — HEPARIN 500 UNITS: 100 SYRINGE at 12:18

## 2019-01-18 NOTE — PROGRESS NOTES
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     HISTORY OF PRESENT ILLNESS: The patient is a very pleasant 78 y.o. female  with past medical history significant for colon cancer diagnosed 07/04/18. The patient presented to [facility] for gastritis and right lower quadrant pain.  This has been going on for the last few months associated with nausea but no vomiting.  She's been having one of dark stools.  Never had this problem before.  She had previous negative colonoscopy.  Colon polyp was found and biopsied. Surgical pathology 07/04/18 revealed invasive poorly differentiated adenocarcinoma in ascending colon. CT abdomen and pelvis 07/06/18 showed mass within the ascending colon with large mercy mass in the adjacent mesentery and prominent nodes around the cecum. The patient presented to Dr Juarez for elective right hemicolectomy on 08/01/18. Pathology 08/01/18 from colon and terminal ileum resection revealed poorly differentiated colonic adenocarcinoma ([id number]) with metastatic adenocarcinoma to 1 of 25 lymph nodes.  She was started on adjuvant chemotherapy using oxaliplatin with Xeloda September 25, 2018.  The patient completed 5 cycles and then decide to stop secondary to multiple side effects.  The patient is here today for follow up visit.        SUBJECTIVE: The patient is her today with her daughter.  She is still recovering from her severe xeloda reaction  with diffuse skin enlargement.     PAST MEDICAL HISTORY/SOCIAL HISTORY/FAMILY HISTORY: Reviewed by me and unchanged from my documentation done on 09/17/18.    Review of Systems   Constitutional: Positive for fatigue. Negative for activity change, appetite change, chills, fever and unexpected weight change.   HENT: Negative for hearing loss, mouth sores, nosebleeds, sore throat and trouble swallowing.    Eyes: Negative for visual disturbance.  "  Respiratory: Negative for cough, chest tightness, shortness of breath and wheezing.    Cardiovascular: Negative for chest pain, palpitations and leg swelling.   Gastrointestinal: Positive for diarrhea and nausea. Negative for abdominal distention, abdominal pain, blood in stool, constipation, rectal pain and vomiting.   Endocrine: Negative for cold intolerance and heat intolerance.   Genitourinary: Negative for difficulty urinating, dysuria, frequency and urgency.   Musculoskeletal: Negative for arthralgias, back pain, gait problem, joint swelling and myalgias.   Skin: Positive for rash.   Neurological: Negative for dizziness, tremors, syncope, weakness, light-headedness, numbness and headaches.   Hematological: Negative for adenopathy. Does not bruise/bleed easily.   Psychiatric/Behavioral: Negative for confusion, sleep disturbance and suicidal ideas. The patient is not nervous/anxious.          Current Outpatient Medications:   •  atenolol (TENORMIN) 50 MG tablet, Take 50 mg by mouth Daily., Disp: , Rfl:   •  lidocaine-prilocaine (EMLA) 2.5-2.5 % cream, Apply  topically to the appropriate area as directed As Needed (45-60 minutes prior to port access.  Cover with saran/plastic wrap.)., Disp: 30 g, Rfl: 3  •  ondansetron (ZOFRAN) 8 MG tablet, Take 1 tablet by mouth Every 8 (Eight) Hours As Needed for Nausea., Disp: 60 tablet, Rfl: 5  •  raNITIdine (ZANTAC) 150 MG tablet, Take 150 mg by mouth As Needed for Heartburn., Disp: , Rfl:     PHYSICAL EXAMINATION:   /84   Pulse 79   Temp 98.6 °F (37 °C) (Temporal)   Resp 12   Ht 160 cm (62.99\")   Wt 59.9 kg (132 lb)   SpO2 99%   BMI 23.39 kg/m²    ECOG Performance Status: 1 - Symptomatic but completely ambulatory  General Appearance:  alert, cooperative, no apparent distress and appears stated age   Neurologic/Psychiatric: A&O x 3, gait steady, appropriate affect, strength 5/5 in all muscle groups   HEENT:  Normocephalic, without obvious abnormality, mucous " membranes moist   Neck: Supple, symmetrical, trachea midline, no adenopathy;  No thyromegaly, masses, or tenderness   Lungs:   Clear to auscultation bilaterally; respirations regular, even, and unlabored bilaterally   Heart:  Regular rate and rhythm, no murmurs appreciated   Abdomen:   Soft, non-tender, non-distended and no organomegaly   Lymph nodes: No cervical, supraclavicular, inguinal or axillary adenopathy noted   Extremities: Normal, atraumatic; no clubbing, cyanosis, or edema    Skin: No rashes, ulcers, or suspicious lesions noted     No visits with results within 2 Week(s) from this visit.   Latest known visit with results is:   Hospital Outpatient Visit on 12/28/2018   Component Date Value Ref Range Status   • Glucose 12/28/2018 96  70 - 100 mg/dL Final   • BUN 12/28/2018 9  9 - 23 mg/dL Final   • Creatinine 12/28/2018 0.74  0.60 - 1.30 mg/dL Final   • Sodium 12/28/2018 138  132 - 146 mmol/L Final   • Potassium 12/28/2018 4.1  3.5 - 5.5 mmol/L Final   • Chloride 12/28/2018 105  99 - 109 mmol/L Final   • CO2 12/28/2018 26.0  20.0 - 31.0 mmol/L Final   • Calcium 12/28/2018 8.6* 8.7 - 10.4 mg/dL Final   • Total Protein 12/28/2018 6.9  5.7 - 8.2 g/dL Final   • Albumin 12/28/2018 3.78  3.20 - 4.80 g/dL Final   • ALT (SGPT) 12/28/2018 17  7 - 40 U/L Final   • AST (SGOT) 12/28/2018 32  0 - 33 U/L Final   • Alkaline Phosphatase 12/28/2018 100  25 - 100 U/L Final   • Total Bilirubin 12/28/2018 0.5  0.3 - 1.2 mg/dL Final   • eGFR Non African Amer 12/28/2018 76  >60 mL/min/1.73 Final   • Globulin 12/28/2018 3.1  gm/dL Final   • A/G Ratio 12/28/2018 1.2* 1.5 - 2.5 g/dL Final   • BUN/Creatinine Ratio 12/28/2018 12.2  7.0 - 25.0 Final   • Anion Gap 12/28/2018 7.0  3.0 - 11.0 mmol/L Final   • WBC 12/28/2018 12.00* 3.50 - 10.80 10*3/mm3 Final   • RBC 12/28/2018 3.01* 3.89 - 5.14 10*6/mm3 Final   • Hemoglobin 12/28/2018 10.6* 11.5 - 15.5 g/dL Final   • Hematocrit 12/28/2018 31.7* 34.5 - 44.0 % Final   • RDW 12/28/2018  26.8* 11.3 - 14.5 % Final   • MCV 12/28/2018 105.1* 80.0 - 99.0 fL Final   • MCH 12/28/2018 35.3* 27.0 - 31.0 pg Final   • MCHC 12/28/2018 33.6  32.0 - 36.0 g/dL Final   • MPV 12/28/2018 8.0  6.0 - 12.0 fL Final   • Platelets 12/28/2018 146* 150 - 450 10*3/mm3 Final   • Neutrophil % 12/28/2018 38.6* 41.0 - 71.0 % Final   • Lymphocyte % 12/28/2018 55.6* 24.0 - 44.0 % Final   • Monocyte % 12/28/2018 5.8  0.0 - 12.0 % Final   • Neutrophils, Absolute 12/28/2018 4.60  1.50 - 8.30 10*3/mm3 Final   • Lymphocytes, Absolute 12/28/2018 6.70* 0.60 - 4.80 10*3/mm3 Final   • Monocytes, Absolute 12/28/2018 0.70  0.00 - 1.00 10*3/mm3 Final        Ct Chest With Contrast    Result Date: 12/28/2018  Narrative: EXAMINATION: CT CHEST WITH CONTRAST-, CT ABDOMEN AND PELVIS WITH CONTRAST-12/28/2018:  INDICATION: Follow-up scan; C18.9-Malignant neoplasm of colon, unspecified, followup colon cancer with chemotherapy.  TECHNIQUE: Multiple axial CT imaging was obtained of the abdomen and pelvis from the lung bases through the pubic symphysis following the administration of intravenous contrast.  The radiation dose reduction device was turned on for each scan per the ALARA (As Low as Reasonably Achievable) protocol.  COMPARISON: 09/25/2018.  FINDINGS:  CHEST: The thyroid is homogeneous. No mediastinal mass or adenopathy. The cardiac chambers are within normal limits. No pericardial effusion. No bulky hilar or axillary lymphadenopathy. Calcified granuloma identified within the left lower lobe. Also calcified granuloma identified in the left upper lobe. The lung parenchyma is otherwise clear. No parenchymal consolidation, pulmonary mass or nodule. No pleural effusion or pneumothorax. Degenerative changes seen within the spine.  ABDOMEN: The liver is homogeneous in appearance. Mild distention identified of the common bile duct. Surgical clips seen in the right upper quadrant from prior cholecystectomy. Cysts seen in both kidneys. The spleen is  homogeneous. The abdominal portions of the gastrointestinal tract are within normal limits. The pancreas is homogeneous. No abnormal mass or fluid collection is identified. No free fluid or free air.  PELVIS: The pelvic organs are unremarkable. The pelvic portions of the gastrointestinal tract are within normal limits. Surgical clips seen in the right lower quadrant. Nonrotation of the right kidney. No free fluid or free air. No abnormal mass or fluid collection is identified.  Delayed imaging reveals contrast seen in the renal collecting systems bilaterally as well as within the ureters and bladder. There is no evidence of obstruction. Small right inguinal hernia containing bowel and fat. The bony structures reveal no evidence of osseous abnormality.      Impression: There is no evidence of metastatic or recurrent disease. Distention seen of the common bile duct with removal of the gallbladder. No obstruction of the kidneys. Postsurgical changes seen in the right lower quadrant with right inguinal hernia with no evidence of strangulation. The findings are stable.  D:  12/28/2018 E:  12/28/2018    This report was finalized on 12/28/2018 11:29 AM by Dr. Eden Grant MD.      Ct Abdomen Pelvis With Contrast    Result Date: 12/28/2018  Narrative: EXAMINATION: CT CHEST WITH CONTRAST-, CT ABDOMEN AND PELVIS WITH CONTRAST-12/28/2018:  INDICATION: Follow-up scan; C18.9-Malignant neoplasm of colon, unspecified, followup colon cancer with chemotherapy.  TECHNIQUE: Multiple axial CT imaging was obtained of the abdomen and pelvis from the lung bases through the pubic symphysis following the administration of intravenous contrast.  The radiation dose reduction device was turned on for each scan per the ALARA (As Low as Reasonably Achievable) protocol.  COMPARISON: 09/25/2018.  FINDINGS:  CHEST: The thyroid is homogeneous. No mediastinal mass or adenopathy. The cardiac chambers are within normal limits. No pericardial  effusion. No bulky hilar or axillary lymphadenopathy. Calcified granuloma identified within the left lower lobe. Also calcified granuloma identified in the left upper lobe. The lung parenchyma is otherwise clear. No parenchymal consolidation, pulmonary mass or nodule. No pleural effusion or pneumothorax. Degenerative changes seen within the spine.  ABDOMEN: The liver is homogeneous in appearance. Mild distention identified of the common bile duct. Surgical clips seen in the right upper quadrant from prior cholecystectomy. Cysts seen in both kidneys. The spleen is homogeneous. The abdominal portions of the gastrointestinal tract are within normal limits. The pancreas is homogeneous. No abnormal mass or fluid collection is identified. No free fluid or free air.  PELVIS: The pelvic organs are unremarkable. The pelvic portions of the gastrointestinal tract are within normal limits. Surgical clips seen in the right lower quadrant. Nonrotation of the right kidney. No free fluid or free air. No abnormal mass or fluid collection is identified.  Delayed imaging reveals contrast seen in the renal collecting systems bilaterally as well as within the ureters and bladder. There is no evidence of obstruction. Small right inguinal hernia containing bowel and fat. The bony structures reveal no evidence of osseous abnormality.      Impression: There is no evidence of metastatic or recurrent disease. Distention seen of the common bile duct with removal of the gallbladder. No obstruction of the kidneys. Postsurgical changes seen in the right lower quadrant with right inguinal hernia with no evidence of strangulation. The findings are stable.  D:  12/28/2018 E:  12/28/2018    This report was finalized on 12/28/2018 11:29 AM by Dr. Eden Grant MD.        ASSESSMENT: The patient is a very pleasant 78 y.o. 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  Ascending colon  adenocarcinoma T3 N1 M0 stage IIIa:  A.  Presenting with abdominal pain and irregular bowel movements  B.  Diagnosed after colonoscopy with a biopsy done on June 27, 2018  C.  Status post right hemicolectomy done by Dr. Wong August 01, 2018  D.  Final pathology revealed with 1 out of 25 positive mesenteric lymph nodes  E. Started adjuvant chemotherapy using Xeloda with oxaliplatin September 25, 2018, status post 5 cycles  E. CEA normal at diagnosis  2.  Hypertension  3.  Normocytic anemia  4.  Two hypodense right hepatic lobe liver lesions.  5.  Chemotherapy-induced nausea  6.  Chemotherapy used diarrhea  7.  Chemotherapy-induced dermatitis     PLAN:   1. I did go over the scan results with the patient and her daughter I reassured her is no evidence of residual or recurrent disease per  2.  The patient would like to stop chemotherapy at this point given her multiple side effects including severe dermatitis from Xeloda.  3.  I will arrange for Port-A-Cath removal with Dr. Ku.  4.  The patient will follow-up with me in 3 months with repeated blood work.  5.  I will repeat her scans in 6 months which would be due July 2019.  6.  The patient would need to have one-year follow-up colonoscopy which would be due August 2019.  7.  I'll flush the patient port today with blood work.    Schuyler Coleman MD  1/18/2019

## 2019-03-01 ENCOUNTER — APPOINTMENT (OUTPATIENT)
Dept: ONCOLOGY | Facility: HOSPITAL | Age: 79
End: 2019-03-01

## 2019-03-12 ENCOUNTER — LAB (OUTPATIENT)
Dept: LAB | Facility: HOSPITAL | Age: 79
End: 2019-03-12

## 2019-03-12 ENCOUNTER — OFFICE VISIT (OUTPATIENT)
Dept: ONCOLOGY | Facility: CLINIC | Age: 79
End: 2019-03-12

## 2019-03-12 VITALS
SYSTOLIC BLOOD PRESSURE: 175 MMHG | WEIGHT: 131 LBS | OXYGEN SATURATION: 98 % | BODY MASS INDEX: 23.21 KG/M2 | TEMPERATURE: 98.3 F | DIASTOLIC BLOOD PRESSURE: 89 MMHG | HEART RATE: 65 BPM | HEIGHT: 63 IN | RESPIRATION RATE: 12 BRPM

## 2019-03-12 DIAGNOSIS — C18.9 MALIGNANT NEOPLASM OF COLON, UNSPECIFIED PART OF COLON (HCC): ICD-10-CM

## 2019-03-12 DIAGNOSIS — C18.9 MALIGNANT NEOPLASM OF COLON, UNSPECIFIED PART OF COLON (HCC): Primary | ICD-10-CM

## 2019-03-12 LAB — CEA SERPL-MCNC: 8.3 NG/ML (ref 0–2.5)

## 2019-03-12 PROCEDURE — 82378 CARCINOEMBRYONIC ANTIGEN: CPT

## 2019-03-12 PROCEDURE — 99214 OFFICE O/P EST MOD 30 MIN: CPT | Performed by: INTERNAL MEDICINE

## 2019-03-12 PROCEDURE — 36415 COLL VENOUS BLD VENIPUNCTURE: CPT

## 2019-03-12 NOTE — PROGRESS NOTES
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     HISTORY OF PRESENT ILLNESS: The patient is a very pleasant 78 y.o. female  with past medical history significant for colon cancer diagnosed 07/04/18. The patient presented to [facility] for gastritis and right lower quadrant pain.  This has been going on for the last few months associated with nausea but no vomiting.  She's been having one of dark stools.  Never had this problem before.  She had previous negative colonoscopy.  Colon polyp was found and biopsied. Surgical pathology 07/04/18 revealed invasive poorly differentiated adenocarcinoma in ascending colon. CT abdomen and pelvis 07/06/18 showed mass within the ascending colon with large mercy mass in the adjacent mesentery and prominent nodes around the cecum. The patient presented to Dr Juarez for elective right hemicolectomy on 08/01/18. Pathology 08/01/18 from colon and terminal ileum resection revealed poorly differentiated colonic adenocarcinoma ([id number]) with metastatic adenocarcinoma to 1 of 25 lymph nodes.  She was started on adjuvant chemotherapy using oxaliplatin with Xeloda September 25, 2018.  The patient completed 5 cycles and then decide to stop secondary to multiple side effects.  The patient is here today for follow up visit.        SUBJECTIVE: The patient is her today with her daughter.  She is complaining of a poor appetite and weight loss but has been having abdominal pain, right upper quadrant.    PAST MEDICAL HISTORY/SOCIAL HISTORY/FAMILY HISTORY: Reviewed by me and unchanged from my documentation done on 09/17/18.    Review of Systems   Constitutional: Positive for fatigue. Negative for activity change, appetite change, chills, fever and unexpected weight change.   HENT: Negative for hearing loss, mouth sores, nosebleeds, sore throat and trouble swallowing.    Eyes: Negative for  "visual disturbance.   Respiratory: Negative for cough, chest tightness, shortness of breath and wheezing.    Cardiovascular: Negative for chest pain, palpitations and leg swelling.   Gastrointestinal: Positive for diarrhea and nausea. Negative for abdominal distention, abdominal pain, blood in stool, constipation, rectal pain and vomiting.   Endocrine: Negative for cold intolerance and heat intolerance.   Genitourinary: Negative for difficulty urinating, dysuria, frequency and urgency.   Musculoskeletal: Negative for arthralgias, back pain, gait problem, joint swelling and myalgias.   Skin: Positive for rash.   Neurological: Negative for dizziness, tremors, syncope, weakness, light-headedness, numbness and headaches.   Hematological: Negative for adenopathy. Does not bruise/bleed easily.   Psychiatric/Behavioral: Negative for confusion, sleep disturbance and suicidal ideas. The patient is not nervous/anxious.          Current Outpatient Medications:   •  atenolol (TENORMIN) 50 MG tablet, Take 50 mg by mouth Daily., Disp: , Rfl:   •  lidocaine-prilocaine (EMLA) 2.5-2.5 % cream, Apply  topically to the appropriate area as directed As Needed (45-60 minutes prior to port access.  Cover with saran/plastic wrap.)., Disp: 30 g, Rfl: 3  •  ondansetron (ZOFRAN) 8 MG tablet, Take 1 tablet by mouth Every 8 (Eight) Hours As Needed for Nausea., Disp: 60 tablet, Rfl: 5  •  raNITIdine (ZANTAC) 150 MG tablet, Take 150 mg by mouth As Needed for Heartburn., Disp: , Rfl:     PHYSICAL EXAMINATION:   /89   Pulse 65   Temp 98.3 °F (36.8 °C) (Oral)   Resp 12   Ht 160 cm (62.99\")   Wt 59.4 kg (131 lb)   SpO2 98%   BMI 23.21 kg/m²    ECOG Performance Status: 1 - Symptomatic but completely ambulatory  General Appearance:  alert, cooperative, no apparent distress and appears stated age   Neurologic/Psychiatric: A&O x 3, gait steady, appropriate affect, strength 5/5 in all muscle groups   HEENT:  Normocephalic, without obvious " abnormality, mucous membranes moist   Neck: Supple, symmetrical, trachea midline, no adenopathy;  No thyromegaly, masses, or tenderness   Lungs:   Clear to auscultation bilaterally; respirations regular, even, and unlabored bilaterally   Heart:  Regular rate and rhythm, no murmurs appreciated   Abdomen:   Soft, non-tender, non-distended and no organomegaly   Lymph nodes: No cervical, supraclavicular, inguinal or axillary adenopathy noted   Extremities: Normal, atraumatic; no clubbing, cyanosis, or edema    Skin: No rashes, ulcers, or suspicious lesions noted     No visits with results within 2 Week(s) from this visit.   Latest known visit with results is:   Hospital Outpatient Visit on 12/28/2018   Component Date Value Ref Range Status   • Glucose 12/28/2018 96  70 - 100 mg/dL Final   • BUN 12/28/2018 9  9 - 23 mg/dL Final   • Creatinine 12/28/2018 0.74  0.60 - 1.30 mg/dL Final   • Sodium 12/28/2018 138  132 - 146 mmol/L Final   • Potassium 12/28/2018 4.1  3.5 - 5.5 mmol/L Final   • Chloride 12/28/2018 105  99 - 109 mmol/L Final   • CO2 12/28/2018 26.0  20.0 - 31.0 mmol/L Final   • Calcium 12/28/2018 8.6* 8.7 - 10.4 mg/dL Final   • Total Protein 12/28/2018 6.9  5.7 - 8.2 g/dL Final   • Albumin 12/28/2018 3.78  3.20 - 4.80 g/dL Final   • ALT (SGPT) 12/28/2018 17  7 - 40 U/L Final   • AST (SGOT) 12/28/2018 32  0 - 33 U/L Final   • Alkaline Phosphatase 12/28/2018 100  25 - 100 U/L Final   • Total Bilirubin 12/28/2018 0.5  0.3 - 1.2 mg/dL Final   • eGFR Non African Amer 12/28/2018 76  >60 mL/min/1.73 Final   • Globulin 12/28/2018 3.1  gm/dL Final   • A/G Ratio 12/28/2018 1.2* 1.5 - 2.5 g/dL Final   • BUN/Creatinine Ratio 12/28/2018 12.2  7.0 - 25.0 Final   • Anion Gap 12/28/2018 7.0  3.0 - 11.0 mmol/L Final   • WBC 12/28/2018 12.00* 3.50 - 10.80 10*3/mm3 Final   • RBC 12/28/2018 3.01* 3.89 - 5.14 10*6/mm3 Final   • Hemoglobin 12/28/2018 10.6* 11.5 - 15.5 g/dL Final   • Hematocrit 12/28/2018 31.7* 34.5 - 44.0 % Final   •  RDW 12/28/2018 26.8* 11.3 - 14.5 % Final   • MCV 12/28/2018 105.1* 80.0 - 99.0 fL Final   • MCH 12/28/2018 35.3* 27.0 - 31.0 pg Final   • MCHC 12/28/2018 33.6  32.0 - 36.0 g/dL Final   • MPV 12/28/2018 8.0  6.0 - 12.0 fL Final   • Platelets 12/28/2018 146* 150 - 450 10*3/mm3 Final   • Neutrophil % 12/28/2018 38.6* 41.0 - 71.0 % Final   • Lymphocyte % 12/28/2018 55.6* 24.0 - 44.0 % Final   • Monocyte % 12/28/2018 5.8  0.0 - 12.0 % Final   • Neutrophils, Absolute 12/28/2018 4.60  1.50 - 8.30 10*3/mm3 Final   • Lymphocytes, Absolute 12/28/2018 6.70* 0.60 - 4.80 10*3/mm3 Final   • Monocytes, Absolute 12/28/2018 0.70  0.00 - 1.00 10*3/mm3 Final        No results found.    ASSESSMENT: The patient is a very pleasant 78 y.o. 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  Ascending colon adenocarcinoma T3 N1 M0 stage IIIa:  A.  Presenting with abdominal pain and irregular bowel movements  B.  Diagnosed after colonoscopy with a biopsy done on June 27, 2018  C.  Status post right hemicolectomy done by Dr. Wong August 01, 2018  D.  Final pathology revealed with 1 out of 25 positive mesenteric lymph nodes  E. Started adjuvant chemotherapy using Xeloda with oxaliplatin September 25, 2018, status post 5 cycles  E. CEA normal at diagnosis  2.  Hypertension  3.  Normocytic anemia  4.  Two hypodense right hepatic lobe liver lesions.  5.  Chemotherapy-induced nausea       PLAN:   1. I did go over the blood work results with the patient and her daughter, unfortunately her CEA is elevated at 17 with elevated liver enzymes.  Those findings are very worrisome for recurrent colon cancer with possibly liver metastases.  2.  I will order CT scans with IV contrast.  3.  The patient will follow up with me in 1 week to go over the results.    4.    The patient would need to have one-year follow-up colonoscopy which would be due August 2019.  5.  I will repeat patient blood work  including serum CEA.  6.  We will continue Zofran as needed for nausea.  7.  I did go over the last scans done in December 2019 and reassured the patient at that time she did not have any evidence of relapsed disease.  Schuyler Coleman MD  3/12/2019

## 2019-03-16 ENCOUNTER — APPOINTMENT (OUTPATIENT)
Dept: CT IMAGING | Facility: HOSPITAL | Age: 79
End: 2019-03-16

## 2019-03-16 ENCOUNTER — HOSPITAL ENCOUNTER (EMERGENCY)
Facility: HOSPITAL | Age: 79
Discharge: HOME | End: 2019-03-16
Attending: EMERGENCY MEDICINE | Admitting: EMERGENCY MEDICINE

## 2019-03-16 ENCOUNTER — APPOINTMENT (OUTPATIENT)
Dept: GENERAL RADIOLOGY | Facility: HOSPITAL | Age: 79
End: 2019-03-16

## 2019-03-16 VITALS
OXYGEN SATURATION: 96 % | HEIGHT: 63 IN | WEIGHT: 130 LBS | RESPIRATION RATE: 16 BRPM | HEART RATE: 67 BPM | BODY MASS INDEX: 23.04 KG/M2 | SYSTOLIC BLOOD PRESSURE: 117 MMHG | DIASTOLIC BLOOD PRESSURE: 71 MMHG | TEMPERATURE: 98.6 F

## 2019-03-16 DIAGNOSIS — C78.7 COLON CANCER METASTASIZED TO LIVER (HCC): ICD-10-CM

## 2019-03-16 DIAGNOSIS — C18.9 COLON CANCER METASTASIZED TO LIVER (HCC): ICD-10-CM

## 2019-03-16 DIAGNOSIS — R10.11 RUQ ABDOMINAL PAIN: Primary | ICD-10-CM

## 2019-03-16 LAB
ALBUMIN SERPL-MCNC: 3.92 G/DL (ref 3.2–4.8)
ALBUMIN/GLOB SERPL: 0.7 G/DL (ref 1.5–2.5)
ALP SERPL-CCNC: 291 U/L (ref 25–100)
ALT SERPL W P-5'-P-CCNC: 29 U/L (ref 7–40)
ANION GAP SERPL CALCULATED.3IONS-SCNC: 7 MMOL/L (ref 3–11)
AST SERPL-CCNC: 60 U/L (ref 0–33)
BACTERIA UR QL AUTO: ABNORMAL /HPF
BASOPHILS # BLD MANUAL: 0 10*3/MM3 (ref 0–0.2)
BASOPHILS NFR BLD AUTO: 0 % (ref 0–1)
BILIRUB SERPL-MCNC: 0.8 MG/DL (ref 0.3–1.2)
BILIRUB UR QL STRIP: NEGATIVE
BUN BLD-MCNC: 11 MG/DL (ref 9–23)
BUN/CREAT SERPL: 13.8 (ref 7–25)
CALCIUM SPEC-SCNC: 9.1 MG/DL (ref 8.7–10.4)
CHLORIDE SERPL-SCNC: 95 MMOL/L (ref 99–109)
CLARITY UR: CLEAR
CO2 SERPL-SCNC: 25 MMOL/L (ref 20–31)
COLOR UR: YELLOW
CREAT BLD-MCNC: 0.8 MG/DL (ref 0.6–1.3)
D-LACTATE SERPL-SCNC: 0.9 MMOL/L (ref 0.5–2)
DEPRECATED RDW RBC AUTO: 50.5 FL (ref 37–54)
EOSINOPHIL # BLD MANUAL: 0 10*3/MM3 (ref 0.1–0.3)
EOSINOPHIL NFR BLD MANUAL: 0 % (ref 0–3)
ERYTHROCYTE [DISTWIDTH] IN BLOOD BY AUTOMATED COUNT: 15 % (ref 11.3–14.5)
GFR SERPL CREATININE-BSD FRML MDRD: 69 ML/MIN/1.73
GLOBULIN UR ELPH-MCNC: 5.3 GM/DL
GLUCOSE BLD-MCNC: 110 MG/DL (ref 70–100)
GLUCOSE UR STRIP-MCNC: NEGATIVE MG/DL
HCT VFR BLD AUTO: 38.5 % (ref 34.5–44)
HGB BLD-MCNC: 12.4 G/DL (ref 11.5–15.5)
HGB UR QL STRIP.AUTO: NEGATIVE
HOLD SPECIMEN: NORMAL
HOLD SPECIMEN: NORMAL
HYALINE CASTS UR QL AUTO: ABNORMAL /LPF
KETONES UR QL STRIP: NEGATIVE
LEUKOCYTE ESTERASE UR QL STRIP.AUTO: ABNORMAL
LIPASE SERPL-CCNC: 29 U/L (ref 6–51)
LYMPHOCYTES # BLD MANUAL: 2.95 10*3/MM3 (ref 0.6–4.8)
LYMPHOCYTES NFR BLD MANUAL: 1 % (ref 0–12)
LYMPHOCYTES NFR BLD MANUAL: 15 % (ref 24–44)
MCH RBC QN AUTO: 30.5 PG (ref 27–31)
MCHC RBC AUTO-ENTMCNC: 32.2 G/DL (ref 32–36)
MCV RBC AUTO: 94.6 FL (ref 80–99)
MONOCYTES # BLD AUTO: 0.2 10*3/MM3 (ref 0–1)
NEUTROPHILS # BLD AUTO: 14.18 10*3/MM3 (ref 1.5–8.3)
NEUTROPHILS NFR BLD MANUAL: 72 % (ref 41–71)
NITRITE UR QL STRIP: NEGATIVE
PH UR STRIP.AUTO: 5.5 [PH] (ref 5–8)
PLAT MORPH BLD: NORMAL
PLATELET # BLD AUTO: 264 10*3/MM3 (ref 150–450)
PMV BLD AUTO: 10.3 FL (ref 6–12)
POTASSIUM BLD-SCNC: 4.3 MMOL/L (ref 3.5–5.5)
PROT SERPL-MCNC: 9.2 G/DL (ref 5.7–8.2)
PROT UR QL STRIP: NEGATIVE
RBC # BLD AUTO: 4.07 10*6/MM3 (ref 3.89–5.14)
RBC # UR: ABNORMAL /HPF
RBC MORPH BLD: NORMAL
REF LAB TEST METHOD: ABNORMAL
SMUDGE CELLS IN BLOOD BY LIGHT MICROSCOPY: 4 /100 WBC
SODIUM BLD-SCNC: 127 MMOL/L (ref 132–146)
SP GR UR STRIP: 1.02 (ref 1–1.03)
SQUAMOUS #/AREA URNS HPF: ABNORMAL /HPF
TROPONIN I SERPL-MCNC: <0.006 NG/ML
UROBILINOGEN UR QL STRIP: ABNORMAL
VARIANT LYMPHS NFR BLD MANUAL: 12 % (ref 0–5)
WBC MORPH BLD: NORMAL
WBC NRBC COR # BLD: 19.69 10*3/MM3 (ref 3.5–10.8)
WBC UR QL AUTO: ABNORMAL /HPF
WHOLE BLOOD HOLD SPECIMEN: NORMAL
WHOLE BLOOD HOLD SPECIMEN: NORMAL

## 2019-03-16 PROCEDURE — 83605 ASSAY OF LACTIC ACID: CPT | Performed by: EMERGENCY MEDICINE

## 2019-03-16 PROCEDURE — 0 IOPAMIDOL PER 1 ML: Performed by: EMERGENCY MEDICINE

## 2019-03-16 PROCEDURE — 81001 URINALYSIS AUTO W/SCOPE: CPT | Performed by: EMERGENCY MEDICINE

## 2019-03-16 PROCEDURE — 85025 COMPLETE CBC W/AUTO DIFF WBC: CPT | Performed by: EMERGENCY MEDICINE

## 2019-03-16 PROCEDURE — 85007 BL SMEAR W/DIFF WBC COUNT: CPT | Performed by: EMERGENCY MEDICINE

## 2019-03-16 PROCEDURE — 71275 CT ANGIOGRAPHY CHEST: CPT

## 2019-03-16 PROCEDURE — 99284 EMERGENCY DEPT VISIT MOD MDM: CPT

## 2019-03-16 PROCEDURE — 74177 CT ABD & PELVIS W/CONTRAST: CPT

## 2019-03-16 PROCEDURE — 25010000002 ONDANSETRON PER 1 MG: Performed by: EMERGENCY MEDICINE

## 2019-03-16 PROCEDURE — 83690 ASSAY OF LIPASE: CPT | Performed by: EMERGENCY MEDICINE

## 2019-03-16 PROCEDURE — 80053 COMPREHEN METABOLIC PANEL: CPT | Performed by: EMERGENCY MEDICINE

## 2019-03-16 PROCEDURE — 96374 THER/PROPH/DIAG INJ IV PUSH: CPT

## 2019-03-16 PROCEDURE — 96375 TX/PRO/DX INJ NEW DRUG ADDON: CPT

## 2019-03-16 PROCEDURE — 25010000002 HYDROMORPHONE PER 4 MG: Performed by: EMERGENCY MEDICINE

## 2019-03-16 PROCEDURE — 93005 ELECTROCARDIOGRAM TRACING: CPT | Performed by: EMERGENCY MEDICINE

## 2019-03-16 PROCEDURE — 84484 ASSAY OF TROPONIN QUANT: CPT | Performed by: EMERGENCY MEDICINE

## 2019-03-16 RX ORDER — ONDANSETRON 2 MG/ML
4 INJECTION INTRAMUSCULAR; INTRAVENOUS ONCE
Status: COMPLETED | OUTPATIENT
Start: 2019-03-16 | End: 2019-03-16

## 2019-03-16 RX ORDER — SODIUM CHLORIDE 0.9 % (FLUSH) 0.9 %
10 SYRINGE (ML) INJECTION AS NEEDED
Status: DISCONTINUED | OUTPATIENT
Start: 2019-03-16 | End: 2019-03-17 | Stop reason: HOSPADM

## 2019-03-16 RX ORDER — HYDROMORPHONE HYDROCHLORIDE 1 MG/ML
0.5 INJECTION, SOLUTION INTRAMUSCULAR; INTRAVENOUS; SUBCUTANEOUS ONCE
Status: COMPLETED | OUTPATIENT
Start: 2019-03-16 | End: 2019-03-16

## 2019-03-16 RX ORDER — HYDROCODONE BITARTRATE AND ACETAMINOPHEN 7.5; 325 MG/1; MG/1
1 TABLET ORAL EVERY 4 HOURS PRN
Qty: 20 TABLET | Refills: 0 | Status: SHIPPED | OUTPATIENT
Start: 2019-03-16 | End: 2019-03-18 | Stop reason: DRUGHIGH

## 2019-03-16 RX ORDER — ONDANSETRON 4 MG/1
4 TABLET, ORALLY DISINTEGRATING ORAL EVERY 6 HOURS PRN
Qty: 15 TABLET | Refills: 0 | Status: SHIPPED | OUTPATIENT
Start: 2019-03-16 | End: 2019-07-01

## 2019-03-16 RX ADMIN — IOPAMIDOL 120 ML: 755 INJECTION, SOLUTION INTRAVENOUS at 20:38

## 2019-03-16 RX ADMIN — HYDROMORPHONE HYDROCHLORIDE 0.5 MG: 1 INJECTION, SOLUTION INTRAMUSCULAR; INTRAVENOUS; SUBCUTANEOUS at 20:46

## 2019-03-16 RX ADMIN — ONDANSETRON 4 MG: 2 INJECTION INTRAMUSCULAR; INTRAVENOUS at 20:45

## 2019-03-16 NOTE — ED PROVIDER NOTES
"Subjective   Su Pedraza is a 78 y.o female who presents to the ED with complaints of abdominal pain. She states that the pain began 3 days ago but worsened today. The pain is located in the right upper quadrant and radiates \"straight through\" to her back. The pain is worsened by breathing. The pain has not changed location since onset. She has had similar pain prior to her colectomy in August 2018, which she underwent for colon cancer.  She underwent chemotherapy after that but had to stop the treatment due to side effects. She recently has had blood work done with Dr. Coleman on 3/15 but has not received the results. She has had decreased appetite but eating does not exacerbate her pain. Patient denies nausea, diarrhea, constipation and fever. She has not had hematuria or difficulty urinating. There are no other acute symptoms at this time.         History provided by:  Patient  Abdominal Pain   Pain location:  RUQ  Pain radiates to:  Back  Duration:  3 days  Timing:  Unable to specify  Progression:  Worsening  Chronicity:  New  Relieved by:  None tried  Worsened by:  Deep breathing  Ineffective treatments:  None tried  Associated symptoms: no constipation, no diarrhea, no fever, no hematuria, no nausea and no vomiting        Review of Systems   Constitutional: Positive for appetite change (decreased). Negative for fever.   Gastrointestinal: Positive for abdominal pain. Negative for constipation, diarrhea, nausea and vomiting.   Genitourinary: Negative for hematuria.   All other systems reviewed and are negative.      Past Medical History:   Diagnosis Date   • Arthritis     hands and legs    • Cancer (CMS/HCC)     colon cancer, uterine cancer   • Full dentures    • Hypertension        Allergies   Allergen Reactions   • Codeine Sulfate Nausea And Vomiting   • Adhesive Tape Rash       Past Surgical History:   Procedure Laterality Date   • APPENDECTOMY     • CARPAL TUNNEL RELEASE      bilat    • CATARACT EXTRACTION " Bilateral    • CHOLECYSTECTOMY     • COLON RESECTION Right 8/1/2018    Procedure: extended right hemicolectomy with end bloc node dissection;  Surgeon: Clovis Juarez MD;  Location: [facility] OR;  Service: General   • COLONOSCOPY     • ENDOSCOPY     • HYSTERECTOMY      partial still have both ovaries    • TONSILLECTOMY AND ADENOIDECTOMY     • VENOUS ACCESS DEVICE (PORT) INSERTION N/A 9/20/2018    Procedure: PORT PLACEMENT;  Surgeon: Dereck Hogue MD;  Location: [facility] OR;  Service: General   • WISDOM TOOTH EXTRACTION         History reviewed. No pertinent family history.    Social History     Socioeconomic History   • Marital status:      Spouse name: Not on file   • Number of children: Not on file   • Years of education: Not on file   • Highest education level: Not on file   Tobacco Use   • Smoking status: Never Smoker   • Smokeless tobacco: Never Used   Substance and Sexual Activity   • Alcohol use: No   • Drug use: No   • Sexual activity: Defer         Objective   Physical Exam   Constitutional: She is oriented to person, place, and time. She appears well-developed and well-nourished. No distress.   HENT:   Head: Normocephalic and atraumatic.   Nose: Nose normal.   Eyes: Conjunctivae are normal. No scleral icterus.   Neck: Normal range of motion. Neck supple.   Cardiovascular: Normal rate and regular rhythm.   Murmur heard.   Systolic murmur is present with a grade of 1/6.  Pulmonary/Chest: Effort normal and breath sounds normal. No respiratory distress.   Abdominal: Soft. Bowel sounds are normal. There is tenderness in the right upper quadrant and right lower quadrant. There is no rebound and no guarding.   Musculoskeletal: Normal range of motion. She exhibits no edema.   Neurological: She is alert and oriented to person, place, and time.   Skin: Skin is warm and dry.   Psychiatric: She has a normal mood and affect. Her behavior is normal.   Nursing note and vitals reviewed.      Procedures          ED Course     EKG SR.  Leukocytosis.  Pain much better with single dose of morphine.  Unfortunately her CT shows new, large, extensive liver mets that were not present less than three months ago.  I discussed this with Dr Coleman.  She has appt with him on Monday.  Discussed findings with pt and family.  As her pain is well controlled with meds we will let her go home and she can see Dr Coleman in the office in 2 days to discuss potential treatment options.                MDM  Number of Diagnoses or Management Options  Colon cancer metastasized to liver (CMS/HCC):   RUQ abdominal pain:      Amount and/or Complexity of Data Reviewed  Clinical lab tests: reviewed and ordered  Tests in the radiology section of CPT®: reviewed and ordered  Decide to obtain previous medical records or to obtain history from someone other than the patient: yes  Obtain history from someone other than the patient: yes  Review and summarize past medical records: yes  Discuss the patient with other providers: yes  Independent visualization of images, tracings, or specimens: yes        Final diagnoses:   RUQ abdominal pain   Colon cancer metastasized to liver (CMS/HCC)       Documentation assistance provided by rin Salcido.  Information recorded by the scribe was done at my direction and has been verified and validated by me.     Diego Salcido  03/16/19 2031       Margarito Ortiz MD  03/16/19 [id number]

## 2019-03-18 ENCOUNTER — HOSPITAL ENCOUNTER (OUTPATIENT)
Dept: CT IMAGING | Facility: HOSPITAL | Age: 79
End: 2019-03-18

## 2019-03-18 ENCOUNTER — OFFICE VISIT (OUTPATIENT)
Dept: ONCOLOGY | Facility: CLINIC | Age: 79
End: 2019-03-18

## 2019-03-18 VITALS
HEIGHT: 63 IN | RESPIRATION RATE: 16 BRPM | TEMPERATURE: 97.3 F | DIASTOLIC BLOOD PRESSURE: 78 MMHG | BODY MASS INDEX: 23.92 KG/M2 | OXYGEN SATURATION: 99 % | SYSTOLIC BLOOD PRESSURE: 150 MMHG | WEIGHT: 135 LBS | HEART RATE: 56 BPM

## 2019-03-18 DIAGNOSIS — C18.9 MALIGNANT NEOPLASM OF COLON, UNSPECIFIED PART OF COLON (HCC): Primary | ICD-10-CM

## 2019-03-18 PROCEDURE — 99215 OFFICE O/P EST HI 40 MIN: CPT | Performed by: INTERNAL MEDICINE

## 2019-03-18 RX ORDER — MEGESTROL ACETATE 40 MG/ML
800 SUSPENSION ORAL DAILY
Qty: 480 ML | Refills: 5 | Status: SHIPPED | OUTPATIENT
Start: 2019-03-18 | End: 2019-12-03

## 2019-03-18 RX ORDER — ATROPINE SULFATE 1 MG/ML
0.25 INJECTION, SOLUTION INTRAMUSCULAR; INTRAVENOUS; SUBCUTANEOUS
Status: CANCELLED | OUTPATIENT
Start: 2019-03-25

## 2019-03-18 RX ORDER — HYDROCODONE BITARTRATE AND ACETAMINOPHEN 10; 325 MG/1; MG/1
1 TABLET ORAL EVERY 6 HOURS PRN
Qty: 120 TABLET | Refills: 0 | Status: SHIPPED | OUTPATIENT
Start: 2019-03-18 | End: 2019-07-01

## 2019-03-18 RX ORDER — PALONOSETRON 0.05 MG/ML
0.25 INJECTION, SOLUTION INTRAVENOUS ONCE
Status: CANCELLED | OUTPATIENT
Start: 2019-03-25

## 2019-03-18 RX ORDER — FLUOROURACIL 50 MG/ML
400 INJECTION, SOLUTION INTRAVENOUS ONCE
Status: CANCELLED | OUTPATIENT
Start: 2019-04-08

## 2019-03-18 RX ORDER — SODIUM CHLORIDE 9 MG/ML
250 INJECTION, SOLUTION INTRAVENOUS ONCE
Status: CANCELLED | OUTPATIENT
Start: 2019-03-25

## 2019-03-18 RX ORDER — HYDROCODONE BITARTRATE AND ACETAMINOPHEN 10; 325 MG/1; MG/1
1 TABLET ORAL EVERY 4 HOURS PRN
Qty: 100 TABLET | Refills: 0 | Status: SHIPPED | OUTPATIENT
Start: 2019-03-18 | End: 2019-03-18 | Stop reason: SDUPTHER

## 2019-03-18 RX ORDER — FLUOROURACIL 50 MG/ML
400 INJECTION, SOLUTION INTRAVENOUS ONCE
Status: CANCELLED | OUTPATIENT
Start: 2019-03-25

## 2019-03-18 RX ORDER — SODIUM CHLORIDE 9 MG/ML
250 INJECTION, SOLUTION INTRAVENOUS ONCE
Status: CANCELLED | OUTPATIENT
Start: 2019-04-08

## 2019-03-18 RX ORDER — PALONOSETRON 0.05 MG/ML
0.25 INJECTION, SOLUTION INTRAVENOUS ONCE
Status: CANCELLED | OUTPATIENT
Start: 2019-04-08

## 2019-03-18 RX ORDER — ATROPINE SULFATE 1 MG/ML
0.25 INJECTION, SOLUTION INTRAMUSCULAR; INTRAVENOUS; SUBCUTANEOUS
Status: CANCELLED | OUTPATIENT
Start: 2019-04-08

## 2019-03-18 NOTE — PROGRESS NOTES
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     HISTORY OF PRESENT ILLNESS: The patient is a very pleasant 78 y.o. female  with past medical history significant for colon cancer diagnosed 07/04/18. The patient presented to [facility] for gastritis and right lower quadrant pain.  This has been going on for the last few months associated with nausea but no vomiting.  She's been having one of dark stools.  Never had this problem before.  She had previous negative colonoscopy.  Colon polyp was found and biopsied. Surgical pathology 07/04/18 revealed invasive poorly differentiated adenocarcinoma in ascending colon. CT abdomen and pelvis 07/06/18 showed mass within the ascending colon with large mercy mass in the adjacent mesentery and prominent nodes around the cecum. The patient presented to Dr Juarez for elective right hemicolectomy on 08/01/18. Pathology 08/01/18 from colon and terminal ileum resection revealed poorly differentiated colonic adenocarcinoma ([id number]) with metastatic adenocarcinoma to 1 of 25 lymph nodes.  She was started on adjuvant chemotherapy using oxaliplatin with Xeloda September 25, 2018.  The patient completed 5 cycles and then decide to stop secondary to multiple side effects.  Repeated scans done on March 16, 2019 revealed new liver metastases.  The patient is here today for follow up visit.        SUBJECTIVE: The patient is her today with her family including 2 daughters and 2 grandchildren.  She is complaining of pain right upper quadrant, hydrocodone 7.5 is helping but she has to take it every 4 hours.     PAST MEDICAL HISTORY/SOCIAL HISTORY/FAMILY HISTORY: Reviewed by me and unchanged from my documentation done on 09/17/18.    Review of Systems   Constitutional: Positive for fatigue. Negative for activity change, appetite change, chills, fever and unexpected weight change.   HENT:  Negative for hearing loss, mouth sores, nosebleeds, sore throat and trouble swallowing.    Eyes: Negative for visual disturbance.   Respiratory: Negative for cough, chest tightness, shortness of breath and wheezing.    Cardiovascular: Negative for chest pain, palpitations and leg swelling.   Gastrointestinal: Positive for diarrhea and nausea. Negative for abdominal distention, abdominal pain, blood in stool, constipation, rectal pain and vomiting.   Endocrine: Negative for cold intolerance and heat intolerance.   Genitourinary: Negative for difficulty urinating, dysuria, frequency and urgency.   Musculoskeletal: Negative for arthralgias, back pain, gait problem, joint swelling and myalgias.   Skin: Positive for rash.   Neurological: Negative for dizziness, tremors, syncope, weakness, light-headedness, numbness and headaches.   Hematological: Negative for adenopathy. Does not bruise/bleed easily.   Psychiatric/Behavioral: Negative for confusion, sleep disturbance and suicidal ideas. The patient is not nervous/anxious.          Current Outpatient Medications:   •  atenolol (TENORMIN) 50 MG tablet, Take 50 mg by mouth Daily., Disp: , Rfl:   •  HYDROcodone-acetaminophen (NORCO)  MG per tablet, Take 1 tablet by mouth Every 6 (Six) Hours As Needed for Moderate Pain ., Disp: 120 tablet, Rfl: 0  •  lidocaine-prilocaine (EMLA) 2.5-2.5 % cream, Apply  topically to the appropriate area as directed As Needed (45-60 minutes prior to port access.  Cover with saran/plastic wrap.)., Disp: 30 g, Rfl: 3  •  megestrol (MEGACE) 40 MG/ML suspension, Take 20 mL by mouth Daily., Disp: 480 mL, Rfl: 5  •  ondansetron (ZOFRAN) 8 MG tablet, Take 1 tablet by mouth Every 8 (Eight) Hours As Needed for Nausea., Disp: 60 tablet, Rfl: 5  •  ondansetron ODT (ZOFRAN-ODT) 4 MG disintegrating tablet, Take 1 tablet by mouth Every 6 (Six) Hours As Needed for Nausea or Vomiting., Disp: 15 tablet, Rfl: 0  •  raNITIdine (ZANTAC) 150 MG tablet, Take  "150 mg by mouth As Needed for Heartburn., Disp: , Rfl:     PHYSICAL EXAMINATION:   /78   Pulse 56   Temp 97.3 °F (36.3 °C) (Temporal)   Resp 16   Ht 160 cm (63\")   Wt 61.2 kg (135 lb)   SpO2 99%   BMI 23.91 kg/m²    ECOG Performance Status: 1 - Symptomatic but completely ambulatory  General Appearance:  alert, cooperative, no apparent distress and appears stated age   Neurologic/Psychiatric: A&O x 3, gait steady, appropriate affect, strength 5/5 in all muscle groups   HEENT:  Normocephalic, without obvious abnormality, mucous membranes moist   Neck: Supple, symmetrical, trachea midline, no adenopathy;  No thyromegaly, masses, or tenderness   Lungs:   Clear to auscultation bilaterally; respirations regular, even, and unlabored bilaterally   Heart:  Regular rate and rhythm, no murmurs appreciated   Abdomen:   Soft, non-tender, non-distended and no organomegaly   Lymph nodes: No cervical, supraclavicular, inguinal or axillary adenopathy noted   Extremities: Normal, atraumatic; no clubbing, cyanosis, or edema    Skin: No rashes, ulcers, or suspicious lesions noted     Admission on 03/16/2019, Discharged on 03/16/2019   Component Date Value Ref Range Status   • Glucose 03/16/2019 110* 70 - 100 mg/dL Final   • BUN 03/16/2019 11  9 - 23 mg/dL Final   • Creatinine 03/16/2019 0.80  0.60 - 1.30 mg/dL Final   • Sodium 03/16/2019 127* 132 - 146 mmol/L Final   • Potassium 03/16/2019 4.3  3.5 - 5.5 mmol/L Final   • Chloride 03/16/2019 95* 99 - 109 mmol/L Final   • CO2 03/16/2019 25.0  20.0 - 31.0 mmol/L Final   • Calcium 03/16/2019 9.1  8.7 - 10.4 mg/dL Final   • Total Protein 03/16/2019 9.2* 5.7 - 8.2 g/dL Final   • Albumin 03/16/2019 3.92  3.20 - 4.80 g/dL Final   • ALT (SGPT) 03/16/2019 29  7 - 40 U/L Final   • AST (SGOT) 03/16/2019 60* 0 - 33 U/L Final   • Alkaline Phosphatase 03/16/2019 291* 25 - 100 U/L Final   • Total Bilirubin 03/16/2019 0.8  0.3 - 1.2 mg/dL Final   • eGFR Non African Amer 03/16/2019 69  >60 " mL/min/1.73 Final   • Globulin 03/16/2019 5.3  gm/dL Final   • A/G Ratio 03/16/2019 0.7* 1.5 - 2.5 g/dL Final   • BUN/Creatinine Ratio 03/16/2019 13.8  7.0 - 25.0 Final   • Anion Gap 03/16/2019 7.0  3.0 - 11.0 mmol/L Final   • Lipase 03/16/2019 29  6 - 51 U/L Final   • Color, UA 03/16/2019 Yellow  Yellow, Straw Final   • Appearance, UA 03/16/2019 Clear  Clear Final   • pH, UA 03/16/2019 5.5  5.0 - 8.0 Final   • Specific Gravity, UA 03/16/2019 1.017  1.001 - 1.030 Final   • Glucose, UA 03/16/2019 Negative  Negative Final   • Ketones, UA 03/16/2019 Negative  Negative Final   • Bilirubin, UA 03/16/2019 Negative  Negative Final   • Blood, UA 03/16/2019 Negative  Negative Final   • Protein, UA 03/16/2019 Negative  Negative Final   • Leuk Esterase, UA 03/16/2019 Trace* Negative Final   • Nitrite, UA 03/16/2019 Negative  Negative Final   • Urobilinogen, UA 03/16/2019 0.2 E.U./dL  0.2 - 1.0 E.U./dL Final   • Lactate 03/16/2019 0.9  0.5 - 2.0 mmol/L Final    Falsely depressed results may occur on samples drawn from patients receiving N-Acetylcysteine (NAC) or Metamizole.   • Extra Tube 03/16/2019 hold for add-on   Final    Auto resulted   • Extra Tube 03/16/2019 Hold for add-ons.   Final    Auto resulted.   • Extra Tube 03/16/2019 hold for add-on   Final    Auto resulted   • Extra Tube 03/16/2019 Hold for add-ons.   Final    Auto resulted.   • WBC 03/16/2019 19.69* 3.50 - 10.80 10*3/mm3 Final   • RBC 03/16/2019 4.07  3.89 - 5.14 10*6/mm3 Final   • Hemoglobin 03/16/2019 12.4  11.5 - 15.5 g/dL Final   • Hematocrit 03/16/2019 38.5  34.5 - 44.0 % Final   • MCV 03/16/2019 94.6  80.0 - 99.0 fL Final   • MCH 03/16/2019 30.5  27.0 - 31.0 pg Final   • MCHC 03/16/2019 32.2  32.0 - 36.0 g/dL Final   • RDW 03/16/2019 15.0* 11.3 - 14.5 % Final   • RDW-SD 03/16/2019 50.5  37.0 - 54.0 fl Final   • MPV 03/16/2019 10.3  6.0 - 12.0 fL Final   • Platelets 03/16/2019 264  150 - 450 10*3/mm3 Final   • Troponin I 03/16/2019 <0.006  <=0.039 ng/mL  Final    Results may be falsely decreased if patient taking Biotin.   • RBC, UA 03/16/2019 0-2  None Seen, 0-2 /HPF Final   • WBC, UA 03/16/2019 3-5* None Seen, 0-2 /HPF Final   • Bacteria, UA 03/16/2019 None Seen  None Seen, Trace /HPF Final   • Squamous Epithelial Cells, UA 03/16/2019 0-2  None Seen, 0-2 /HPF Final   • Hyaline Casts, UA 03/16/2019 0-6  0 - 6 /LPF Final   • Methodology 03/16/2019 Automated Microscopy   Final   • Neutrophil % 03/16/2019 72.0* 41.0 - 71.0 % Final   • Lymphocyte % 03/16/2019 15.0* 24.0 - 44.0 % Final   • Monocyte % 03/16/2019 1.0  0.0 - 12.0 % Final   • Eosinophil % 03/16/2019 0.0  0.0 - 3.0 % Final   • Basophil % 03/16/2019 0.0  0.0 - 1.0 % Final   • Atypical Lymphocyte % 03/16/2019 12.0* 0.0 - 5.0 % Final   • Neutrophils Absolute 03/16/2019 14.18* 1.50 - 8.30 10*3/mm3 Final   • Lymphocytes Absolute 03/16/2019 2.95  0.60 - 4.80 10*3/mm3 Final   • Monocytes Absolute 03/16/2019 0.20  0.00 - 1.00 10*3/mm3 Final   • Eosinophils Absolute 03/16/2019 0.00* 0.10 - 0.30 10*3/mm3 Final   • Basophils Absolute 03/16/2019 0.00  0.00 - 0.20 10*3/mm3 Final   • Smudge Cells 03/16/2019 4.0  /100 WBC Final   • RBC Morphology 03/16/2019 Normal  Normal Final   • WBC Morphology 03/16/2019 Normal  Normal Final   • Platelet Morphology 03/16/2019 Normal  Normal Final   Lab on 03/12/2019   Component Date Value Ref Range Status   • CEA 03/12/2019 8.30* 0.00 - 2.50 ng/mL Final        Ct Abdomen Pelvis With Contrast    Result Date: 3/16/2019  Narrative: EXAM:  CT Abdomen and Pelvis With Contrast EXAM DATE/TIME:  3/16/2019 8:18 PM CLINICAL HISTORY:  78 years old, female; Pain; Abdominal pain; Prior surgery; Surgery date: 6+ months; Surgery type: Delma; Patient HX: Right sided pain; Additional info: Ruq/rlq pain, history of colon cancer with concern for recurrence TECHNIQUE:  Axial computed tomography images of the abdomen and pelvis with intravenous contrast.  All CT scans at this facility use at least one of  these dose optimization techniques: automated exposure control; mA and/or kV adjustment per patient size (includes targeted exams where dose is matched to clinical indication); or iterative reconstruction.  Coronal reformatted images were created and reviewed. CONTRAST:  Contrast Material: 120 ml of isovue 370; Contrast Route: IV COMPARISON:  No relevant prior studies available. FINDINGS:  Lower thorax:  Minimal bibasilar atelectasis and/or scarring. Small-sized hiatal hernia. ABDOMEN:  Liver:  Multiple (approximately 10) slightly heterogeneous, hypodense lesions throughout the hepatic parenchyma, the largest within the posterior segment right hepatic lobe measuring approximately 5 cm in diameter, most compatible with metastases.  Gallbladder and bile ducts:  Gallbladder is surgically absent. Mild dilation of the intra-and extrahepatic bile ducts, likely secondary to patient's age and postcholecystectomy state.  Pancreas: Normal.   Spleen: Normal.   Adrenals: Normal.   Kidneys and ureters:  3 cm simple left renal cyst. 3.3 cm simple right renal cyst. 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  Stomach and bowel:  Colonic diverticulosis. Postsurgical changes of prior subtotal right colectomy.  Appendix: No evidence of appendicitis. PELVIS:  Bladder: Unremarkable as visualized.  Reproductive: Unremarkable as visualized. ABDOMEN and PELVIS:  Intraperitoneal space: Normal. No free air. No significant fluid collection.  Bones/joints:  Degenerative changes of the hips and sacral iliac joints. Multilevel thoracolumbar spine degenerative changes.  Soft tissues: Normal.  Vasculature:  Atherosclerotic disease of the abdominal aorta and iliac arteries.  Lymph nodes: Normal. No enlarged lymph nodes.     Impression: Multiple (approximately 10) slightly heterogeneous, hypodense lesions throughout the hepatic parenchyma, the largest within the posterior segment right hepatic lobe measuring approximately 5 cm in diameter, most  compatible with metastases. THIS DOCUMENT HAS BEEN ELECTRONICALLY SIGNED BY CHARBEL DUMONT MD    Ct Angiogram Chest With Contrast    Result Date: 3/16/2019  Narrative: EXAM:  CT Angiography Chest With Contrast EXAM DATE/TIME:  3/16/2019 8:18 PM CLINICAL HISTORY:  78 years old, female; Pain; Chest pain; Patient HX: Pleura pain with HX of colon cancer. ; Additional info: Pleuritic r lower chest/ruq pain, history of colon cancer with concern for recurrence TECHNIQUE:  Axial computed tomographic angiography images of the chest with intravenous contrast using CT angiography protocol.  All CT scans at this facility use at least one of these dose optimization techniques: automated exposure control; mA and/or kV adjustment per patient size (includes targeted exams where dose is matched to clinical indication); or iterative reconstruction.  MIP reconstructed images were created and reviewed. CONTRAST:  Contrast Material: 120 ml of isovue 370; Contrast Route: IV COMPARISON:  CT CHEST W CONTRAST 12/28/2018 9:11 AM FINDINGS:  Pulmonary arteries:  No pulmonary emboli.  Aorta:  Atherosclerotic disease of the thoracic aorta, without aneurysm or dissection.  Lungs:  Calcified granulomas within the left upper lobe.  Pleural space: Normal. No pneumothorax. No pleural effusion.  Heart:  Moderate three-vessel coronary atherosclerotic calcification.  Mediastinum:  Small-sized hiatal hernia.  Liver:  Multiple low attenuation lesions throughout the liver, likely metastases, better evaluated on dedicated abdominal CT from same day.  Lymph nodes:  Few small lymph nodes with mediastinum, likely reactive.  Bones/joints:  Multilevel thoracic spine degenerative changes.  Soft tissues: Unremarkable.     Impression: 1. No pulmonary emboli. 2. Multiple low attenuation lesions throughout the liver, likely metastases, better evaluated on dedicated abdominal CT from same day. THIS DOCUMENT HAS BEEN ELECTRONICALLY SIGNED BY CHARBEL DUMONT  MD      ASSESSMENT: The patient is a very pleasant 78 y.o. 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  Ascending colon adenocarcinoma T3 N1 M0 stage IIIa:  A.  Presenting with abdominal pain and irregular bowel movements  B.  Diagnosed after colonoscopy with a biopsy done on June 27, 2018  C.  Status post right hemicolectomy done by Dr. Wong August 01, 2018  D.  Final pathology revealed with 1 out of 25 positive mesenteric lymph nodes  E. Started adjuvant chemotherapy using Xeloda with oxaliplatin September 25, 2018, status post 5 cycles  E. CEA normal at diagnosis  2.  Hypertension  3.  Normocytic anemia  4.  Two hypodense right hepatic lobe liver lesions.  5.  Chemotherapy-induced nausea       PLAN:   1. I did go over the scan results with the patient and her family, I reviewed the films myself, went over the pictures with them, unfortunately patient has evidence of relapsed disease with diffuse liver metastases.  2.  At this point gave patient 2 options either best supportive hughes, versus palliative chemotherapy.  Patient is interested in trying chemotherapy.  She will get started on full ferry we will add Avastin with cycle #2.  3.  I will arrange for PICC line placement.  4.  I will check molecular testing looking for  mutations as well as biomarkers for immunotherapy.  5.  We discussed potential side effects of chemotherapy including diarrhea colitis low blood counts infusion reactions dehydration infection, and potential death.  6.  I will change her pain medicine to Lortab 10/325 mg every 6 hours as needed for cancer related pain.  7.  I will start the patient on Zofran as needed for nausea.  8.  She will follow-up with me in 3 weeks for cycle #2.  9.  I will repeat the patient's scans before cycle #6.  10.  We will monitor the patient blood pressure we may have to adjust her treatment if needed.  11.  I will monitor patient blood work  including blood counts kidney function liver function and electrolytes.  We will also check CEA on day 1 of each cycle.  Schuyler Coleman MD  3/18/2019

## 2019-03-22 ENCOUNTER — HOSPITAL ENCOUNTER (OUTPATIENT)
Dept: INFUSION THERAPY | Facility: HOSPITAL | Age: 79
Discharge: HOME | End: 2019-03-22
Admitting: INTERNAL MEDICINE

## 2019-03-22 VITALS
RESPIRATION RATE: 18 BRPM | TEMPERATURE: 98.4 F | DIASTOLIC BLOOD PRESSURE: 70 MMHG | HEART RATE: 73 BPM | BODY MASS INDEX: 23.88 KG/M2 | WEIGHT: 134.8 LBS | SYSTOLIC BLOOD PRESSURE: 123 MMHG | OXYGEN SATURATION: 98 % | HEIGHT: 63 IN

## 2019-03-22 PROCEDURE — C1751 CATH, INF, PER/CENT/MIDLINE: HCPCS

## 2019-03-22 PROCEDURE — C1894 INTRO/SHEATH, NON-LASER: HCPCS

## 2019-03-22 RX ORDER — SODIUM CHLORIDE 0.9 % (FLUSH) 0.9 %
10 SYRINGE (ML) INJECTION AS NEEDED
Status: DISCONTINUED | OUTPATIENT
Start: 2019-03-22 | End: 2019-03-24 | Stop reason: HOSPADM

## 2019-03-25 ENCOUNTER — EDUCATION (OUTPATIENT)
Dept: ONCOLOGY | Facility: HOSPITAL | Age: 79
End: 2019-03-25

## 2019-03-25 ENCOUNTER — HOSPITAL ENCOUNTER (OUTPATIENT)
Dept: ONCOLOGY | Facility: HOSPITAL | Age: 79
Setting detail: INFUSION SERIES
Discharge: HOME | End: 2019-03-25

## 2019-03-25 ENCOUNTER — HOSPITAL ENCOUNTER (OUTPATIENT)
Dept: ONCOLOGY | Facility: HOSPITAL | Age: 79
Discharge: HOME | End: 2019-03-25

## 2019-03-25 ENCOUNTER — TELEPHONE (OUTPATIENT)
Dept: ONCOLOGY | Facility: CLINIC | Age: 79
End: 2019-03-25

## 2019-03-25 ENCOUNTER — DOCUMENTATION (OUTPATIENT)
Dept: NUTRITION | Facility: HOSPITAL | Age: 79
End: 2019-03-25

## 2019-03-25 VITALS
TEMPERATURE: 98.4 F | SYSTOLIC BLOOD PRESSURE: 122 MMHG | HEART RATE: 66 BPM | BODY MASS INDEX: 23.57 KG/M2 | HEIGHT: 63 IN | WEIGHT: 133 LBS | DIASTOLIC BLOOD PRESSURE: 61 MMHG | RESPIRATION RATE: 20 BRPM

## 2019-03-25 DIAGNOSIS — C18.9 MALIGNANT NEOPLASM OF COLON, UNSPECIFIED PART OF COLON (HCC): Primary | ICD-10-CM

## 2019-03-25 LAB
ALBUMIN SERPL-MCNC: 3.39 G/DL (ref 3.2–4.8)
ALBUMIN/GLOB SERPL: 0.8 G/DL (ref 1.5–2.5)
ALP SERPL-CCNC: 234 U/L (ref 25–100)
ALT SERPL W P-5'-P-CCNC: 16 U/L (ref 7–40)
ANION GAP SERPL CALCULATED.3IONS-SCNC: 6 MMOL/L (ref 3–11)
AST SERPL-CCNC: 26 U/L (ref 0–33)
BILIRUB SERPL-MCNC: 0.6 MG/DL (ref 0.3–1.2)
BUN BLD-MCNC: 13 MG/DL (ref 9–23)
BUN/CREAT SERPL: 17.6 (ref 7–25)
CALCIUM SPEC-SCNC: 8.3 MG/DL (ref 8.7–10.4)
CEA SERPL-MCNC: 17.8 NG/ML (ref 0–2.5)
CHLORIDE SERPL-SCNC: 102 MMOL/L (ref 99–109)
CO2 SERPL-SCNC: 24 MMOL/L (ref 20–31)
CREAT BLD-MCNC: 0.74 MG/DL (ref 0.6–1.3)
ERYTHROCYTE [DISTWIDTH] IN BLOOD BY AUTOMATED COUNT: 15 % (ref 11.3–14.5)
GFR SERPL CREATININE-BSD FRML MDRD: 76 ML/MIN/1.73
GLOBULIN UR ELPH-MCNC: 4 GM/DL
GLUCOSE BLD-MCNC: 102 MG/DL (ref 70–100)
HCT VFR BLD AUTO: 32.1 % (ref 34.5–44)
HGB BLD-MCNC: 10.5 G/DL (ref 11.5–15.5)
LYMPHOCYTES # BLD AUTO: 5.8 10*3/MM3 (ref 0.6–4.8)
LYMPHOCYTES NFR BLD AUTO: 35.6 % (ref 24–44)
MCH RBC QN AUTO: 30.1 PG (ref 27–31)
MCHC RBC AUTO-ENTMCNC: 32.7 G/DL (ref 32–36)
MCV RBC AUTO: 92 FL (ref 80–99)
MONOCYTES # BLD AUTO: 0.4 10*3/MM3 (ref 0–1)
MONOCYTES NFR BLD AUTO: 2.2 % (ref 0–12)
NEUTROPHILS # BLD AUTO: 10.1 10*3/MM3 (ref 1.5–8.3)
NEUTROPHILS NFR BLD AUTO: 62.2 % (ref 41–71)
PLATELET # BLD AUTO: 306 10*3/MM3 (ref 150–450)
PMV BLD AUTO: 7.6 FL (ref 6–12)
POTASSIUM BLD-SCNC: 4.5 MMOL/L (ref 3.5–5.5)
PROT SERPL-MCNC: 7.4 G/DL (ref 5.7–8.2)
RBC # BLD AUTO: 3.49 10*6/MM3 (ref 3.89–5.14)
SODIUM BLD-SCNC: 132 MMOL/L (ref 132–146)
WBC NRBC COR # BLD: 16.3 10*3/MM3 (ref 3.5–10.8)

## 2019-03-25 PROCEDURE — 96367 TX/PROPH/DG ADDL SEQ IV INF: CPT

## 2019-03-25 PROCEDURE — 96375 TX/PRO/DX INJ NEW DRUG ADDON: CPT

## 2019-03-25 PROCEDURE — 25010000002 FLUOROURACIL PER 500 MG: Performed by: INTERNAL MEDICINE

## 2019-03-25 PROCEDURE — 85025 COMPLETE CBC W/AUTO DIFF WBC: CPT | Performed by: INTERNAL MEDICINE

## 2019-03-25 PROCEDURE — 25010000002 DEXAMETHASONE PER 1 MG: Performed by: INTERNAL MEDICINE

## 2019-03-25 PROCEDURE — 96411 CHEMO IV PUSH ADDL DRUG: CPT

## 2019-03-25 PROCEDURE — 25010000002 LEUCOVORIN CALCIUM PER 50 MG: Performed by: INTERNAL MEDICINE

## 2019-03-25 PROCEDURE — 96409 CHEMO IV PUSH SNGL DRUG: CPT

## 2019-03-25 PROCEDURE — 25010000002 PALONOSETRON PER 25 MCG: Performed by: INTERNAL MEDICINE

## 2019-03-25 PROCEDURE — 96413 CHEMO IV INFUSION 1 HR: CPT

## 2019-03-25 PROCEDURE — 96368 THER/DIAG CONCURRENT INF: CPT

## 2019-03-25 PROCEDURE — 25010000002 IRINOTECAN PER 20 MG: Performed by: INTERNAL MEDICINE

## 2019-03-25 PROCEDURE — 96415 CHEMO IV INFUSION ADDL HR: CPT

## 2019-03-25 PROCEDURE — 80053 COMPREHEN METABOLIC PANEL: CPT | Performed by: INTERNAL MEDICINE

## 2019-03-25 PROCEDURE — 96416 CHEMO PROLONG INFUSE W/PUMP: CPT

## 2019-03-25 PROCEDURE — 82378 CARCINOEMBRYONIC ANTIGEN: CPT | Performed by: INTERNAL MEDICINE

## 2019-03-25 RX ORDER — FLUOROURACIL 50 MG/ML
400 INJECTION, SOLUTION INTRAVENOUS ONCE
Status: COMPLETED | OUTPATIENT
Start: 2019-03-25 | End: 2019-03-25

## 2019-03-25 RX ORDER — PALONOSETRON 0.05 MG/ML
0.25 INJECTION, SOLUTION INTRAVENOUS ONCE
Status: COMPLETED | OUTPATIENT
Start: 2019-03-25 | End: 2019-03-25

## 2019-03-25 RX ORDER — SODIUM CHLORIDE 9 MG/ML
250 INJECTION, SOLUTION INTRAVENOUS ONCE
Status: COMPLETED | OUTPATIENT
Start: 2019-03-25 | End: 2019-03-25

## 2019-03-25 RX ORDER — ATROPINE SULFATE 1 MG/ML
0.25 INJECTION, SOLUTION INTRAMUSCULAR; INTRAVENOUS; SUBCUTANEOUS
Status: DISCONTINUED | OUTPATIENT
Start: 2019-03-25 | End: 2019-03-26 | Stop reason: HOSPADM

## 2019-03-25 RX ADMIN — LEUCOVORIN CALCIUM 660 MG: 350 INJECTION, POWDER, LYOPHILIZED, FOR SOLUTION INTRAMUSCULAR; INTRAVENOUS at 09:47

## 2019-03-25 RX ADMIN — DEXTROSE MONOHYDRATE 300 MG: 50 INJECTION, SOLUTION INTRAVENOUS at 09:47

## 2019-03-25 RX ADMIN — SODIUM CHLORIDE 250 ML: 9 INJECTION, SOLUTION INTRAVENOUS at 09:15

## 2019-03-25 RX ADMIN — PALONOSETRON HYDROCHLORIDE 0.25 MG: 0.25 INJECTION, SOLUTION INTRAVENOUS at 09:15

## 2019-03-25 RX ADMIN — FLUOROURACIL 660 MG: 50 INJECTION, SOLUTION INTRAVENOUS at 12:12

## 2019-03-25 RX ADMIN — DEXAMETHASONE SODIUM PHOSPHATE 12 MG: 4 INJECTION, SOLUTION INTRA-ARTICULAR; INTRALESIONAL; INTRAMUSCULAR; INTRAVENOUS; SOFT TISSUE at 09:15

## 2019-03-25 RX ADMIN — ATROPINE SULFATE 0.25 MG: 1 INJECTION, SOLUTION INTRAMUSCULAR; INTRAVENOUS; SUBCUTANEOUS at 09:46

## 2019-03-25 RX ADMIN — FLUOROURACIL 4000 MG: 50 INJECTION, SOLUTION INTRAVENOUS at 12:19

## 2019-03-25 NOTE — TELEPHONE ENCOUNTER
----- Message from Sussy Mayorga RN sent at 3/25/2019  9:51 AM EDT -----  Regarding: Home Health   Mrs. Pedraza is here for C1 Folfiri and is asking since she lives in [place] to have home gerard disconnect the bulb as well as do PICC dressing changes weekly. Is this possible to be set up?    Thanks,   Sussy [id number]

## 2019-03-25 NOTE — PROGRESS NOTES
Onc Nutrition     Patient:  Su Pedraza  YOB: 1940    Diagnosis: recurrent colon cancer  Treatment:  FOLFIRI + Avastin(starting cycle 2) - every 14 days    Weight:  133#; fairly stable the past 4-5 months  Nutrition Symptoms:  Altered appetite and constipation    Met with patient and family during her chemotherapy infusion appointment.  Note patient has started on a new treatment plan due to recurrent disease.  She states her appetite has been improving recently; note patient started on Megace 3/18/19.  She also complains of occasional constipation for which she takes Miralax with results.      Reviewed the importance of good nutrition during her treatment course.  Advised her to be eating smaller more frequent meals/snacks throughout the day to aid with potential nausea management.  Emphasized the importance of protein and its role in the diet; reviewed high protein foods; and recommended she have a protein source at each meal/snack.  Also emphasized the importance of hydration; reviewed good hydrating fluid options; and strongly encouraged her to increase her fluid consumption.  Discussed nutritional supplements and their role in the diet; reviewed different types both commercial and homemade; and encouraged her to drink them as needed based on her appetite and oral intake.    Answered their questions and both voiced understanding of information discussed.  Encouraged her to call RD with questions.  Will follow up as indicated.    Eden Hall, ASHLEY  03/25/19

## 2019-03-25 NOTE — PLAN OF CARE
[facility] • [address]  • [address] • [place] [id number] • [phone]      CHEMOTHERAPY EDUCATION SHEET    NAME:  Su Pedraza      : 1940           DATE: 19    Booklets Given: Chemotherapy and You []  Eating Hints []    Sexuality/Fertility Books []     Chemotherapy/Biotherapy Education Sheets: (list all that apply)  Fluorouracil, Leucovorin, Irinotecan, Bevacizumab                                                                                                                                                                 Chemotherapy Regimen:  Fluorouracil, Leucovorin, Irinotecan, Bevacizumab (Flurouracil bolus day 1, 46 hour infusion day 1, removed day 3; Leucovorin day 1, irinotecan day 1, bevacizumab day 1) (Day 1 Cycle 1; 14-day cycle; 3/25/19)    TOPICS EDUCATION PROVIDED EDUCATION REINFORCED COMMENTS   ANEMIA:  role of RBC, cause, s/s, ways to manage, role of transfusion [x] [] Educated patient on possible fatigue.   THROMBOCYTOPENIA:  role of platelet, cause, s/s, ways to prevent bleeding, things to avoid, when to seek help [x] [] Educated patient on decreased platelet counts and risk of bleeding.   NEUTROPENIA:  role of WBC, cause, infection precautions, s/s of infection, when to call MD [x] [] Educated patient on increased risk of infection and methods to prevent infection   NUTRITION & APPETITE CHANGES:  importance of maintaining healthy diet & weight, ways to manage to improve intake, dietary consult, exercise regimen [] []    DIARRHEA:  causes, s/s of dehydration, ways to manage, dietary changes, when to call MD [x] [] Discussed immediate and delayed diarrhea from irinotecan and how to use imodium at home   CONSTIPATION:  causes, ways to manage, dietary changes, when to call MD [x] [] Discussed possibility of constipation.   NAUSEA & VOMITING:  cause, use of antiemetics, dietary changes, when to call MD [x] [] Educated patient that nausea and vomiting is common with these  agents and to use zofran if needed   MOUTH SORES:  causes, oral care, ways to manage [x] [] Taught patient to make soda/salt soln to protect mouth. Call if sores form.   ALOPECIA:  cause, ways to manage, resources [x] [] Informed patient that these agents may cause hair loss and that prescription wigs are available if need be.    INFERTILITY & SEXUALITY:  causes, fertility preservation options, sexuality changes, ways to manage, importance of birth control [] []    NERVOUS SYSTEM CHANGES:  causes, s/s, neuropathies, cognitive changes, ways to manage [] []    PAIN:  causes, ways to manage [] [] ????   SKIN & NAIL CHANGES:  cause, s/s, ways to manage [x] [] Informed patient that they may experience a range of dermatological toxicities and to call if they have something form.   ORGAN TOXICITIES:  cause, s/s, need for diagnostic tests, labs, when to notify MD [] []    SURVIVORSHIP:  distress, distress assessment, secondary malignancies, early/late effects, follow-up, social issues, social support [] []    HOME CARE:  use of spill kits, storing of PO chemo, how to manage bodily fluids [] []    MISCELLANEOUS:  drug interactions, administration, vesicant, et [x] [] Discussed fatigue with patient and that it may result from bevacizumab along with lowered red blood cells.     Referrals:        Notes:   Patient's sister was present for the conversation. Discussed side effects and presented them with a calendar. Patient's sister was concerned that she would become too fatigued and may be bed bound. Patient didn't think that would happen. I reinforced that treatment is always optional and that the patient can stop treatment at anytime if they want. Patient said she would pray about it. Patient has no further questions at this time.    Bill Ott, PharmD candidate

## 2019-03-27 ENCOUNTER — TELEPHONE (OUTPATIENT)
Dept: ONCOLOGY | Facility: CLINIC | Age: 79
End: 2019-03-27

## 2019-03-27 NOTE — TELEPHONE ENCOUNTER
----- Message from Hui Palacios sent at 3/27/2019  9:29 AM EDT -----  Regarding: [facility]  Contact: [phone]  Lisa with a  [facility] called and needs to know if the picc line needs to be flushed daily she is there now and needs to know?

## 2019-04-08 ENCOUNTER — HOSPITAL ENCOUNTER (OUTPATIENT)
Dept: ONCOLOGY | Facility: HOSPITAL | Age: 79
Setting detail: INFUSION SERIES
Discharge: HOME | End: 2019-04-08

## 2019-04-08 ENCOUNTER — OFFICE VISIT (OUTPATIENT)
Dept: ONCOLOGY | Facility: CLINIC | Age: 79
End: 2019-04-08

## 2019-04-08 ENCOUNTER — TELEPHONE (OUTPATIENT)
Dept: ONCOLOGY | Facility: CLINIC | Age: 79
End: 2019-04-08

## 2019-04-08 VITALS
OXYGEN SATURATION: 97 % | BODY MASS INDEX: 24.63 KG/M2 | RESPIRATION RATE: 12 BRPM | HEART RATE: 63 BPM | SYSTOLIC BLOOD PRESSURE: 127 MMHG | HEIGHT: 63 IN | DIASTOLIC BLOOD PRESSURE: 70 MMHG | TEMPERATURE: 98 F | WEIGHT: 139 LBS

## 2019-04-08 DIAGNOSIS — C18.9 MALIGNANT NEOPLASM OF COLON, UNSPECIFIED PART OF COLON (HCC): Primary | ICD-10-CM

## 2019-04-08 LAB
ALBUMIN SERPL-MCNC: 3.6 G/DL (ref 3.5–5.2)
ALBUMIN/GLOB SERPL: 0.8 G/DL
ALP SERPL-CCNC: 141 U/L (ref 39–117)
ALT SERPL W P-5'-P-CCNC: 7 U/L (ref 1–33)
ANION GAP SERPL CALCULATED.3IONS-SCNC: 9 MMOL/L
AST SERPL-CCNC: 18 U/L (ref 1–32)
BILIRUB SERPL-MCNC: 0.3 MG/DL (ref 0.2–1.2)
BILIRUB UR QL STRIP: NEGATIVE
BUN BLD-MCNC: 12 MG/DL (ref 8–23)
BUN/CREAT SERPL: 11.8 (ref 7–25)
CALCIUM SPEC-SCNC: 8.2 MG/DL (ref 8.6–10.5)
CEA SERPL-MCNC: 33.3 NG/ML
CHLORIDE SERPL-SCNC: 101 MMOL/L (ref 98–107)
CLARITY UR: CLEAR
CO2 SERPL-SCNC: 25 MMOL/L (ref 22–29)
COLOR UR: YELLOW
CREAT BLD-MCNC: 1.02 MG/DL (ref 0.57–1)
ERYTHROCYTE [DISTWIDTH] IN BLOOD BY AUTOMATED COUNT: 16.1 % (ref 12.3–15.4)
GFR SERPL CREATININE-BSD FRML MDRD: 52 ML/MIN/1.73
GLOBULIN UR ELPH-MCNC: 4.8 GM/DL
GLUCOSE BLD-MCNC: 88 MG/DL (ref 65–99)
GLUCOSE UR STRIP-MCNC: NEGATIVE MG/DL
HCT VFR BLD AUTO: 30.4 % (ref 34–46.6)
HGB BLD-MCNC: 10.2 G/DL (ref 12–15.9)
HGB UR QL STRIP.AUTO: NEGATIVE
KETONES UR QL STRIP: NEGATIVE
LEUKOCYTE ESTERASE UR QL STRIP.AUTO: NEGATIVE
LYMPHOCYTES # BLD AUTO: 6.4 10*3/MM3 (ref 0.7–3.1)
LYMPHOCYTES NFR BLD AUTO: 57.8 % (ref 19.6–45.3)
MCH RBC QN AUTO: 29.8 PG (ref 26.6–33)
MCHC RBC AUTO-ENTMCNC: 33.5 G/DL (ref 31.5–35.7)
MCV RBC AUTO: 89 FL (ref 79–97)
MONOCYTES # BLD AUTO: 0.8 10*3/MM3 (ref 0.1–0.9)
MONOCYTES NFR BLD AUTO: 7.4 % (ref 5–12)
NEUTROPHILS # BLD AUTO: 3.8 10*3/MM3 (ref 1.4–7)
NEUTROPHILS NFR BLD AUTO: 34.8 % (ref 42.7–76)
NITRITE UR QL STRIP: NEGATIVE
PH UR STRIP.AUTO: 6 [PH] (ref 5–8)
PLATELET # BLD AUTO: 281 10*3/MM3 (ref 140–450)
PMV BLD AUTO: 7.7 FL (ref 6–12)
POTASSIUM BLD-SCNC: 4.3 MMOL/L (ref 3.5–5.2)
PROT SERPL-MCNC: 8.4 G/DL (ref 6–8.5)
PROT UR QL STRIP: NEGATIVE
RBC # BLD AUTO: 3.42 10*6/MM3 (ref 3.77–5.28)
SODIUM BLD-SCNC: 135 MMOL/L (ref 136–145)
SP GR UR STRIP: 1.01 (ref 1–1.03)
UROBILINOGEN UR QL STRIP: NORMAL
WBC NRBC COR # BLD: 11 10*3/MM3 (ref 3.4–10.8)

## 2019-04-08 PROCEDURE — 96367 TX/PROPH/DG ADDL SEQ IV INF: CPT

## 2019-04-08 PROCEDURE — 99215 OFFICE O/P EST HI 40 MIN: CPT | Performed by: INTERNAL MEDICINE

## 2019-04-08 PROCEDURE — 96366 THER/PROPH/DIAG IV INF ADDON: CPT

## 2019-04-08 PROCEDURE — 25010000002 BEVACIZUMAB 100 MG/4ML SOLUTION 4 ML VIAL: Performed by: INTERNAL MEDICINE

## 2019-04-08 PROCEDURE — 25010000002 FLUOROURACIL PER 500 MG: Performed by: INTERNAL MEDICINE

## 2019-04-08 PROCEDURE — 25010000002 IRINOTECAN PER 20 MG: Performed by: INTERNAL MEDICINE

## 2019-04-08 PROCEDURE — 81003 URINALYSIS AUTO W/O SCOPE: CPT | Performed by: INTERNAL MEDICINE

## 2019-04-08 PROCEDURE — 85025 COMPLETE CBC W/AUTO DIFF WBC: CPT | Performed by: INTERNAL MEDICINE

## 2019-04-08 PROCEDURE — 25010000002 PALONOSETRON PER 25 MCG: Performed by: INTERNAL MEDICINE

## 2019-04-08 PROCEDURE — 96415 CHEMO IV INFUSION ADDL HR: CPT

## 2019-04-08 PROCEDURE — 25010000002 DEXAMETHASONE PER 1 MG: Performed by: INTERNAL MEDICINE

## 2019-04-08 PROCEDURE — 36593 DECLOT VASCULAR DEVICE: CPT

## 2019-04-08 PROCEDURE — 96375 TX/PRO/DX INJ NEW DRUG ADDON: CPT

## 2019-04-08 PROCEDURE — 25010000002 LEUCOVORIN 500 MG RECONSTITUTED SOLUTION 1 EACH VIAL: Performed by: INTERNAL MEDICINE

## 2019-04-08 PROCEDURE — 96417 CHEMO IV INFUS EACH ADDL SEQ: CPT

## 2019-04-08 PROCEDURE — 80053 COMPREHEN METABOLIC PANEL: CPT | Performed by: INTERNAL MEDICINE

## 2019-04-08 PROCEDURE — 96368 THER/DIAG CONCURRENT INF: CPT

## 2019-04-08 PROCEDURE — 96411 CHEMO IV PUSH ADDL DRUG: CPT

## 2019-04-08 PROCEDURE — 82378 CARCINOEMBRYONIC ANTIGEN: CPT | Performed by: INTERNAL MEDICINE

## 2019-04-08 PROCEDURE — 25010000002 ALTEPLASE 2 MG RECONSTITUTED SOLUTION: Performed by: NURSE PRACTITIONER

## 2019-04-08 PROCEDURE — 96413 CHEMO IV INFUSION 1 HR: CPT

## 2019-04-08 PROCEDURE — 25010000002 LEUCOVORIN 200 MG RECONSTITUTED SOLUTION 1 EACH VIAL: Performed by: INTERNAL MEDICINE

## 2019-04-08 RX ORDER — PALONOSETRON 0.05 MG/ML
0.25 INJECTION, SOLUTION INTRAVENOUS ONCE
Status: COMPLETED | OUTPATIENT
Start: 2019-04-08 | End: 2019-04-08

## 2019-04-08 RX ORDER — ATROPINE SULFATE 1 MG/ML
0.25 INJECTION, SOLUTION INTRAMUSCULAR; INTRAVENOUS; SUBCUTANEOUS
Status: DISCONTINUED | OUTPATIENT
Start: 2019-04-08 | End: 2019-04-09 | Stop reason: HOSPADM

## 2019-04-08 RX ORDER — LIDOCAINE AND PRILOCAINE 25; 25 MG/G; MG/G
CREAM TOPICAL AS NEEDED
Qty: 30 G | Refills: 3 | Status: SHIPPED | OUTPATIENT
Start: 2019-04-08 | End: 2019-07-01

## 2019-04-08 RX ORDER — FLUOROURACIL 50 MG/ML
400 INJECTION, SOLUTION INTRAVENOUS ONCE
Status: COMPLETED | OUTPATIENT
Start: 2019-04-08 | End: 2019-04-08

## 2019-04-08 RX ORDER — SODIUM CHLORIDE 9 MG/ML
250 INJECTION, SOLUTION INTRAVENOUS ONCE
Status: COMPLETED | OUTPATIENT
Start: 2019-04-08 | End: 2019-04-08

## 2019-04-08 RX ADMIN — IRINOTECAN HYDROCHLORIDE 300 MG: 20 INJECTION, SOLUTION INTRAVENOUS at 14:35

## 2019-04-08 RX ADMIN — DEXAMETHASONE SODIUM PHOSPHATE 12 MG: 4 INJECTION, SOLUTION INTRAMUSCULAR; INTRAVENOUS at 12:05

## 2019-04-08 RX ADMIN — ATROPINE SULFATE 0.25 MG: 1 INJECTION, SOLUTION INTRAMUSCULAR; INTRAVENOUS; SUBCUTANEOUS at 14:26

## 2019-04-08 RX ADMIN — ALTEPLASE: 2.2 INJECTION, POWDER, LYOPHILIZED, FOR SOLUTION INTRAVENOUS at 11:14

## 2019-04-08 RX ADMIN — FLUOROURACIL 660 MG: 50 INJECTION, SOLUTION INTRAVENOUS at 16:15

## 2019-04-08 RX ADMIN — LEUCOVORIN CALCIUM 660 MG: 500 INJECTION, POWDER, LYOPHILIZED, FOR SOLUTION INTRAMUSCULAR; INTRAVENOUS at 14:33

## 2019-04-08 RX ADMIN — PALONOSETRON HYDROCHLORIDE 0.25 MG: 0.25 INJECTION, SOLUTION INTRAVENOUS at 12:02

## 2019-04-08 RX ADMIN — BEVACIZUMAB 300 MG: 100 INJECTION, SOLUTION INTRAVENOUS at 12:42

## 2019-04-08 RX ADMIN — SODIUM CHLORIDE 250 ML: 9 INJECTION, SOLUTION INTRAVENOUS at 12:37

## 2019-04-08 NOTE — PROGRESS NOTES
Referral placed for patient to have port placed. EMLA sent to pharmacy.  Helene with [facility] in [place] informed that we are pulling PICC line today, so she will not need weekly PICC flush/dressing change. She will need to continue bulb removal and port flush as scheduled starting on 4/24.

## 2019-04-08 NOTE — PROGRESS NOTES
5 FU bulb not given on 4-8-2019 due to no central line access. Picc line was pulled, not blood return.

## 2019-04-08 NOTE — PROGRESS NOTES
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     HISTORY OF PRESENT ILLNESS: The patient is a very pleasant 79 y.o. female  with past medical history significant for colon cancer diagnosed 07/04/18. The patient presented to [facility] for gastritis and right lower quadrant pain.  This has been going on for the last few months associated with nausea but no vomiting.  She's been having one of dark stools.  Never had this problem before.  She had previous negative colonoscopy.  Colon polyp was found and biopsied. Surgical pathology 07/04/18 revealed invasive poorly differentiated adenocarcinoma in ascending colon. CT abdomen and pelvis 07/06/18 showed mass within the ascending colon with large mercy mass in the adjacent mesentery and prominent nodes around the cecum. The patient presented to Dr Juarez for elective right hemicolectomy on 08/01/18. Pathology 08/01/18 from colon and terminal ileum resection revealed poorly differentiated colonic adenocarcinoma ([id number]) with metastatic adenocarcinoma to 1 of 25 lymph nodes.  She was started on adjuvant chemotherapy using oxaliplatin with Xeloda September 25, 2018.  The patient completed 5 cycles and then decide to stop secondary to multiple side effects.  Repeated scans done on March 16, 2019 revealed new liver metastases.  She was started on palliative chemotherapy with FOLFIRI plus Avastin March 25, 2019.  The patient is here today for follow up visit was cycle #2.        SUBJECTIVE: The patient is her today with her niece.  She was able to tolerate chemotherapy.  She is complaining of joint stiffness.  Her right upper quadrant abdominal pain is significantly better.    PAST MEDICAL HISTORY/SOCIAL HISTORY/FAMILY HISTORY: Reviewed by me and unchanged from my documentation done on 09/17/18.    Review of Systems   Constitutional: Positive for fatigue. Negative for  activity change, appetite change, chills, fever and unexpected weight change.   HENT: Negative for hearing loss, mouth sores, nosebleeds, sore throat and trouble swallowing.    Eyes: Negative for visual disturbance.   Respiratory: Negative for cough, chest tightness, shortness of breath and wheezing.    Cardiovascular: Negative for chest pain, palpitations and leg swelling.   Gastrointestinal: Positive for diarrhea and nausea. Negative for abdominal distention, abdominal pain, blood in stool, constipation, rectal pain and vomiting.   Endocrine: Negative for cold intolerance and heat intolerance.   Genitourinary: Negative for difficulty urinating, dysuria, frequency and urgency.   Musculoskeletal: Negative for arthralgias, back pain, gait problem, joint swelling and myalgias.   Skin: Positive for rash.   Neurological: Negative for dizziness, tremors, syncope, weakness, light-headedness, numbness and headaches.   Hematological: Negative for adenopathy. Does not bruise/bleed easily.   Psychiatric/Behavioral: Negative for confusion, sleep disturbance and suicidal ideas. The patient is not nervous/anxious.          Current Outpatient Medications:   •  atenolol (TENORMIN) 50 MG tablet, Take 50 mg by mouth Daily., Disp: , Rfl:   •  HYDROcodone-acetaminophen (NORCO)  MG per tablet, Take 1 tablet by mouth Every 6 (Six) Hours As Needed for Moderate Pain ., Disp: 120 tablet, Rfl: 0  •  lidocaine-prilocaine (EMLA) 2.5-2.5 % cream, Apply  topically to the appropriate area as directed As Needed (45-60 minutes prior to port access.  Cover with saran/plastic wrap.)., Disp: 30 g, Rfl: 3  •  megestrol (MEGACE) 40 MG/ML suspension, Take 20 mL by mouth Daily., Disp: 480 mL, Rfl: 5  •  ondansetron (ZOFRAN) 8 MG tablet, Take 1 tablet by mouth Every 8 (Eight) Hours As Needed for Nausea., Disp: 60 tablet, Rfl: 5  •  ondansetron ODT (ZOFRAN-ODT) 4 MG disintegrating tablet, Take 1 tablet by mouth Every 6 (Six) Hours As Needed for  "Nausea or Vomiting., Disp: 15 tablet, Rfl: 0  •  raNITIdine (ZANTAC) 150 MG tablet, Take 150 mg by mouth As Needed for Heartburn., Disp: , Rfl:     PHYSICAL EXAMINATION:   /70   Pulse 63   Temp 98 °F (36.7 °C) (Temporal)   Resp 12   Ht 160 cm (63\")   Wt 63 kg (139 lb)   SpO2 97%   BMI 24.62 kg/m²    ECOG Performance Status: 1 - Symptomatic but completely ambulatory  General Appearance:  alert, cooperative, no apparent distress and appears stated age   Neurologic/Psychiatric: A&O x 3, gait steady, appropriate affect, strength 5/5 in all muscle groups   HEENT:  Normocephalic, without obvious abnormality, mucous membranes moist   Neck: Supple, symmetrical, trachea midline, no adenopathy;  No thyromegaly, masses, or tenderness   Lungs:   Clear to auscultation bilaterally; respirations regular, even, and unlabored bilaterally   Heart:  Regular rate and rhythm, no murmurs appreciated   Abdomen:   Soft, non-tender, non-distended and no organomegaly   Lymph nodes: No cervical, supraclavicular, inguinal or axillary adenopathy noted   Extremities: Normal, atraumatic; no clubbing, cyanosis, or edema    Skin: No rashes, ulcers, or suspicious lesions noted     No visits with results within 2 Week(s) from this visit.   Latest known visit with results is:   Hospital Outpatient Visit on 03/25/2019   Component Date Value Ref Range Status   • CEA 03/25/2019 17.80* 0.00 - 2.50 ng/mL Final   • Glucose 03/25/2019 102* 70 - 100 mg/dL Final   • BUN 03/25/2019 13  9 - 23 mg/dL Final   • Creatinine 03/25/2019 0.74  0.60 - 1.30 mg/dL Final   • Sodium 03/25/2019 132  132 - 146 mmol/L Final   • Potassium 03/25/2019 4.5  3.5 - 5.5 mmol/L Final   • Chloride 03/25/2019 102  99 - 109 mmol/L Final   • CO2 03/25/2019 24.0  20.0 - 31.0 mmol/L Final   • Calcium 03/25/2019 8.3* 8.7 - 10.4 mg/dL Final   • Total Protein 03/25/2019 7.4  5.7 - 8.2 g/dL Final   • Albumin 03/25/2019 3.39  3.20 - 4.80 g/dL Final   • ALT (SGPT) 03/25/2019 16  7 - " 40 U/L Final   • AST (SGOT) 03/25/2019 26  0 - 33 U/L Final   • Alkaline Phosphatase 03/25/2019 234* 25 - 100 U/L Final   • Total Bilirubin 03/25/2019 0.6  0.3 - 1.2 mg/dL Final   • eGFR Non African Amer 03/25/2019 76  >60 mL/min/1.73 Final   • Globulin 03/25/2019 4.0  gm/dL Final   • A/G Ratio 03/25/2019 0.8* 1.5 - 2.5 g/dL Final   • BUN/Creatinine Ratio 03/25/2019 17.6  7.0 - 25.0 Final   • Anion Gap 03/25/2019 6.0  3.0 - 11.0 mmol/L Final   • WBC 03/25/2019 16.30* 3.50 - 10.80 10*3/mm3 Final   • RBC 03/25/2019 3.49* 3.89 - 5.14 10*6/mm3 Final   • Hemoglobin 03/25/2019 10.5* 11.5 - 15.5 g/dL Final   • Hematocrit 03/25/2019 32.1* 34.5 - 44.0 % Final   • RDW 03/25/2019 15.0* 11.3 - 14.5 % Final   • MCV 03/25/2019 92.0  80.0 - 99.0 fL Final   • MCH 03/25/2019 30.1  27.0 - 31.0 pg Final   • MCHC 03/25/2019 32.7  32.0 - 36.0 g/dL Final   • MPV 03/25/2019 7.6  6.0 - 12.0 fL Final   • Platelets 03/25/2019 306  150 - 450 10*3/mm3 Final   • Neutrophil % 03/25/2019 62.2  41.0 - 71.0 % Final   • Lymphocyte % 03/25/2019 35.6  24.0 - 44.0 % Final   • Monocyte % 03/25/2019 2.2  0.0 - 12.0 % Final   • Neutrophils, Absolute 03/25/2019 10.10* 1.50 - 8.30 10*3/mm3 Final   • Lymphocytes, Absolute 03/25/2019 5.80* 0.60 - 4.80 10*3/mm3 Final   • Monocytes, Absolute 03/25/2019 0.40  0.00 - 1.00 10*3/mm3 Final        Ct Abdomen Pelvis With Contrast    Result Date: 3/16/2019  Narrative: EXAM:  CT Abdomen and Pelvis With Contrast EXAM DATE/TIME:  3/16/2019 8:18 PM CLINICAL HISTORY:  78 years old, female; Pain; Abdominal pain; Prior surgery; Surgery date: 6+ months; Surgery type: Delma; Patient HX: Right sided pain; Additional info: Ruq/rlq pain, history of colon cancer with concern for recurrence TECHNIQUE:  Axial computed tomography images of the abdomen and pelvis with intravenous contrast.  All CT scans at this facility use at least one of these dose optimization techniques: automated exposure control; mA and/or kV adjustment per  patient size (includes targeted exams where dose is matched to clinical indication); or iterative reconstruction.  Coronal reformatted images were created and reviewed. CONTRAST:  Contrast Material: 120 ml of isovue 370; Contrast Route: IV COMPARISON:  No relevant prior studies available. FINDINGS:  Lower thorax:  Minimal bibasilar atelectasis and/or scarring. Small-sized hiatal hernia. ABDOMEN:  Liver:  Multiple (approximately 10) slightly heterogeneous, hypodense lesions throughout the hepatic parenchyma, the largest within the posterior segment right hepatic lobe measuring approximately 5 cm in diameter, most compatible with metastases.  Gallbladder and bile ducts:  Gallbladder is surgically absent. Mild dilation of the intra-and extrahepatic bile ducts, likely secondary to patient's age and postcholecystectomy state.  Pancreas: Normal.   Spleen: Normal.   Adrenals: Normal.   Kidneys and ureters:  3 cm simple left renal cyst. 3.3 cm simple right renal cyst. 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  Stomach and bowel:  Colonic diverticulosis. Postsurgical changes of prior subtotal right colectomy.  Appendix: No evidence of appendicitis. PELVIS:  Bladder: Unremarkable as visualized.  Reproductive: Unremarkable as visualized. ABDOMEN and PELVIS:  Intraperitoneal space: Normal. No free air. No significant fluid collection.  Bones/joints:  Degenerative changes of the hips and sacral iliac joints. Multilevel thoracolumbar spine degenerative changes.  Soft tissues: Normal.  Vasculature:  Atherosclerotic disease of the abdominal aorta and iliac arteries.  Lymph nodes: Normal. No enlarged lymph nodes.     Impression: Multiple (approximately 10) slightly heterogeneous, hypodense lesions throughout the hepatic parenchyma, the largest within the posterior segment right hepatic lobe measuring approximately 5 cm in diameter, most compatible with metastases. THIS DOCUMENT HAS BEEN ELECTRONICALLY SIGNED BY CHARBEL DUMONT MD    Ct  Angiogram Chest With Contrast    Result Date: 3/16/2019  Narrative: EXAM:  CT Angiography Chest With Contrast EXAM DATE/TIME:  3/16/2019 8:18 PM CLINICAL HISTORY:  78 years old, female; Pain; Chest pain; Patient HX: Pleura pain with HX of colon cancer. ; Additional info: Pleuritic r lower chest/ruq pain, history of colon cancer with concern for recurrence TECHNIQUE:  Axial computed tomographic angiography images of the chest with intravenous contrast using CT angiography protocol.  All CT scans at this facility use at least one of these dose optimization techniques: automated exposure control; mA and/or kV adjustment per patient size (includes targeted exams where dose is matched to clinical indication); or iterative reconstruction.  MIP reconstructed images were created and reviewed. CONTRAST:  Contrast Material: 120 ml of isovue 370; Contrast Route: IV COMPARISON:  CT CHEST W CONTRAST 12/28/2018 9:11 AM FINDINGS:  Pulmonary arteries:  No pulmonary emboli.  Aorta:  Atherosclerotic disease of the thoracic aorta, without aneurysm or dissection.  Lungs:  Calcified granulomas within the left upper lobe.  Pleural space: Normal. No pneumothorax. No pleural effusion.  Heart:  Moderate three-vessel coronary atherosclerotic calcification.  Mediastinum:  Small-sized hiatal hernia.  Liver:  Multiple low attenuation lesions throughout the liver, likely metastases, better evaluated on dedicated abdominal CT from same day.  Lymph nodes:  Few small lymph nodes with mediastinum, likely reactive.  Bones/joints:  Multilevel thoracic spine degenerative changes.  Soft tissues: Unremarkable.     Impression: 1. No pulmonary emboli. 2. Multiple low attenuation lesions throughout the liver, likely metastases, better evaluated on dedicated abdominal CT from same day. THIS DOCUMENT HAS BEEN ELECTRONICALLY SIGNED BY CHARBEL DUMONT MD      ASSESSMENT: The patient is a very pleasant 79 y.o. female  with Poorly differentiated ascending colon  adenocarcinoma T3N1M0 stage IIIA:    PROBLEM LIST:   1.  Ascending colon adenocarcinoma T3 N1 M0 stage IIIa:  A.  Presenting with abdominal pain and irregular bowel movements  B.  Diagnosed after colonoscopy with a biopsy done on June 27, 2018  C.  Status post right hemicolectomy done by Dr. Wong August 01, 2018  D.  Final pathology revealed with 1 out of 25 positive mesenteric lymph nodes  E. Started adjuvant chemotherapy using Xeloda with oxaliplatin September 25, 2018, status post 5 cycles  F. CEA 17.8 March 25, 2019  G.  Progressive disease with diffuse liver metastases dominant CAT scan done on March 2019  H. Started on palliative chemotherapy with FOLFIRI plus Avastin March 25, 2019, status post 1 cycle.  I.  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  2.  Hypertension  3.  Normocytic anemia  4.  Two hypodense right hepatic lobe liver lesions.  5.  Chemotherapy-induced nausea       PLAN:   1. I will proceed with palliative chemotherapy with FOLFIRI plus Avastin as scheduled today cycle #2.  2.   I will monitor patient blood work including blood counts kidney function liver function and electrolytes.  We will also check CEA on day 1 of each cycle.  3.  I will continue PICC line care.  4.  I did go over the gene analysis results with the patient and her family.  She does have microsatellite instability.  If she cannot tolerate this treatment or if she fails this current second line chemotherapy she would be started on immune therapy.  Patient family made aware about the possibility of Benton syndrome.  The patient is not interested in seeing a gene counselor at this time.  Her 3 children has been refusing colonoscopies.  5.  We discussed potential side effects of chemotherapy including diarrhea colitis low blood counts infusion reactions  dehydration infection, and potential death.  6.  I will continue her pain medicine to Lortab 10/325 mg every 6 hours as needed for cancer related pain.  7.  I will continue the patient on Zofran as needed for nausea.  8.  She will follow-up with me in 3 weeks for cycle #2.  9.  I will repeat the patient's scans before cycle #6.  10.  We will monitor the patient blood pressure we may have to adjust her treatment if needed.  11.  Repeat scans prior to cycle #5.  Schuyler Coleman MD  4/8/2019

## 2019-04-08 NOTE — TELEPHONE ENCOUNTER
----- Message from Hui Palacios sent at 4/8/2019  1:13 PM EDT -----  Regarding: [facility] CALL  Contact: [phone]  Helene with [facility] called she just spoke with She she has one more follow up question. Please call.

## 2019-04-08 NOTE — TELEPHONE ENCOUNTER
Advised that PICC will need to be flushed with two 10mL flushes of normal saline followed by 500 units of heparin

## 2019-04-11 LAB
CYTO UR: NORMAL
LAB AP CARIS, ADDENDUM: NORMAL
LAB AP CASE REPORT: NORMAL
LAB AP CLINICAL INFORMATION: NORMAL
LAB AP DIAGNOSIS COMMENT: NORMAL
LAB AP INTEGRATED ONCOLOGY, ADDENDUM: NORMAL
PATH REPORT.FINAL DX SPEC: NORMAL
PATH REPORT.GROSS SPEC: NORMAL

## 2019-04-15 ENCOUNTER — APPOINTMENT (OUTPATIENT)
Dept: LAB | Facility: HOSPITAL | Age: 79
End: 2019-04-15

## 2019-04-15 ENCOUNTER — APPOINTMENT (OUTPATIENT)
Dept: PREADMISSION TESTING | Facility: HOSPITAL | Age: 79
End: 2019-04-15

## 2019-04-15 VITALS — HEIGHT: 63 IN | WEIGHT: 137.13 LBS | BODY MASS INDEX: 24.3 KG/M2

## 2019-04-18 ENCOUNTER — ANESTHESIA EVENT (OUTPATIENT)
Dept: PERIOP | Facility: HOSPITAL | Age: 79
End: 2019-04-18

## 2019-04-19 ENCOUNTER — HOSPITAL ENCOUNTER (OUTPATIENT)
Facility: HOSPITAL | Age: 79
Setting detail: HOSPITAL OUTPATIENT SURGERY
Discharge: HOME | End: 2019-04-19
Attending: SURGERY | Admitting: SURGERY

## 2019-04-19 ENCOUNTER — APPOINTMENT (OUTPATIENT)
Dept: GENERAL RADIOLOGY | Facility: HOSPITAL | Age: 79
End: 2019-04-19

## 2019-04-19 ENCOUNTER — ANESTHESIA (OUTPATIENT)
Dept: PERIOP | Facility: HOSPITAL | Age: 79
End: 2019-04-19

## 2019-04-19 VITALS
TEMPERATURE: 97.4 F | HEIGHT: 63 IN | BODY MASS INDEX: 24.27 KG/M2 | HEART RATE: 62 BPM | DIASTOLIC BLOOD PRESSURE: 77 MMHG | OXYGEN SATURATION: 93 % | SYSTOLIC BLOOD PRESSURE: 157 MMHG | RESPIRATION RATE: 16 BRPM | WEIGHT: 137 LBS

## 2019-04-19 LAB — POTASSIUM BLD-SCNC: 4.1 MMOL/L (ref 3.5–5.2)

## 2019-04-19 PROCEDURE — 71045 X-RAY EXAM CHEST 1 VIEW: CPT

## 2019-04-19 PROCEDURE — 76000 FLUOROSCOPY <1 HR PHYS/QHP: CPT

## 2019-04-19 PROCEDURE — 84132 ASSAY OF SERUM POTASSIUM: CPT | Performed by: ANESTHESIOLOGY

## 2019-04-19 PROCEDURE — 25010000002 DEXAMETHASONE PER 1 MG: Performed by: NURSE ANESTHETIST, CERTIFIED REGISTERED

## 2019-04-19 PROCEDURE — C1894 INTRO/SHEATH, NON-LASER: HCPCS | Performed by: SURGERY

## 2019-04-19 PROCEDURE — 25010000002 PROPOFOL 10 MG/ML EMULSION: Performed by: NURSE ANESTHETIST, CERTIFIED REGISTERED

## 2019-04-19 PROCEDURE — 25010000002 ONDANSETRON PER 1 MG: Performed by: NURSE ANESTHETIST, CERTIFIED REGISTERED

## 2019-04-19 PROCEDURE — 25010000003 CEFAZOLIN IN DEXTROSE 2-4 GM/100ML-% SOLUTION: Performed by: NURSE ANESTHETIST, CERTIFIED REGISTERED

## 2019-04-19 PROCEDURE — C1788 PORT, INDWELLING, IMP: HCPCS | Performed by: SURGERY

## 2019-04-19 PROCEDURE — 25010000002 HEPARIN (PORCINE) PER 1000 UNITS: Performed by: SURGERY

## 2019-04-19 DEVICE — POWERPORT CLEARVUE ISP IMPLANTABLE PORT WITH ATTACHABLE 8F POLYURETHANE OPEN-ENDED SINGLE-LUMEN VENOUS CATHETER PROCEDURAL KIT
Type: IMPLANTABLE DEVICE | Status: FUNCTIONAL
Brand: POWERPORT CLEARVUE

## 2019-04-19 RX ORDER — SODIUM CHLORIDE, SODIUM LACTATE, POTASSIUM CHLORIDE, CALCIUM CHLORIDE 600; 310; 30; 20 MG/100ML; MG/100ML; MG/100ML; MG/100ML
9 INJECTION, SOLUTION INTRAVENOUS CONTINUOUS PRN
Status: DISCONTINUED | OUTPATIENT
Start: 2019-04-19 | End: 2019-04-20 | Stop reason: HOSPADM

## 2019-04-19 RX ORDER — MAGNESIUM HYDROXIDE 1200 MG/15ML
LIQUID ORAL AS NEEDED
Status: DISCONTINUED | OUTPATIENT
Start: 2019-04-19 | End: 2019-04-19 | Stop reason: HOSPADM

## 2019-04-19 RX ORDER — PROPOFOL 10 MG/ML
VIAL (ML) INTRAVENOUS AS NEEDED
Status: DISCONTINUED | OUTPATIENT
Start: 2019-04-19 | End: 2019-04-19 | Stop reason: SURG

## 2019-04-19 RX ORDER — DEXAMETHASONE SODIUM PHOSPHATE 4 MG/ML
INJECTION, SOLUTION INTRA-ARTICULAR; INTRALESIONAL; INTRAMUSCULAR; INTRAVENOUS; SOFT TISSUE AS NEEDED
Status: DISCONTINUED | OUTPATIENT
Start: 2019-04-19 | End: 2019-04-19 | Stop reason: SURG

## 2019-04-19 RX ORDER — SODIUM CHLORIDE 0.9 % (FLUSH) 0.9 %
3-10 SYRINGE (ML) INJECTION AS NEEDED
Status: DISCONTINUED | OUTPATIENT
Start: 2019-04-19 | End: 2019-04-19 | Stop reason: HOSPADM

## 2019-04-19 RX ORDER — PROPOFOL 10 MG/ML
VIAL (ML) INTRAVENOUS CONTINUOUS PRN
Status: DISCONTINUED | OUTPATIENT
Start: 2019-04-19 | End: 2019-04-19 | Stop reason: SURG

## 2019-04-19 RX ORDER — BUPIVACAINE HYDROCHLORIDE AND EPINEPHRINE 2.5; 5 MG/ML; UG/ML
INJECTION, SOLUTION EPIDURAL; INFILTRATION; INTRACAUDAL; PERINEURAL AS NEEDED
Status: DISCONTINUED | OUTPATIENT
Start: 2019-04-19 | End: 2019-04-19 | Stop reason: HOSPADM

## 2019-04-19 RX ORDER — CEFAZOLIN SODIUM 2 G/100ML
INJECTION, SOLUTION INTRAVENOUS AS NEEDED
Status: DISCONTINUED | OUTPATIENT
Start: 2019-04-19 | End: 2019-04-19 | Stop reason: SURG

## 2019-04-19 RX ORDER — LIDOCAINE HYDROCHLORIDE 10 MG/ML
0.5 INJECTION, SOLUTION EPIDURAL; INFILTRATION; INTRACAUDAL; PERINEURAL ONCE AS NEEDED
Status: COMPLETED | OUTPATIENT
Start: 2019-04-19 | End: 2019-04-19

## 2019-04-19 RX ORDER — ONDANSETRON 2 MG/ML
INJECTION INTRAMUSCULAR; INTRAVENOUS AS NEEDED
Status: DISCONTINUED | OUTPATIENT
Start: 2019-04-19 | End: 2019-04-19 | Stop reason: SURG

## 2019-04-19 RX ORDER — FAMOTIDINE 20 MG/1
20 TABLET, FILM COATED ORAL
Status: COMPLETED | OUTPATIENT
Start: 2019-04-19 | End: 2019-04-19

## 2019-04-19 RX ORDER — SODIUM CHLORIDE 0.9 % (FLUSH) 0.9 %
3 SYRINGE (ML) INJECTION EVERY 12 HOURS SCHEDULED
Status: DISCONTINUED | OUTPATIENT
Start: 2019-04-19 | End: 2019-04-19 | Stop reason: HOSPADM

## 2019-04-19 RX ORDER — FENTANYL CITRATE 50 UG/ML
50 INJECTION, SOLUTION INTRAMUSCULAR; INTRAVENOUS
Status: DISCONTINUED | OUTPATIENT
Start: 2019-04-19 | End: 2019-04-20 | Stop reason: HOSPADM

## 2019-04-19 RX ORDER — ONDANSETRON 2 MG/ML
4 INJECTION INTRAMUSCULAR; INTRAVENOUS ONCE AS NEEDED
Status: DISCONTINUED | OUTPATIENT
Start: 2019-04-19 | End: 2019-04-20 | Stop reason: HOSPADM

## 2019-04-19 RX ADMIN — SODIUM CHLORIDE, POTASSIUM CHLORIDE, SODIUM LACTATE AND CALCIUM CHLORIDE 9 ML/HR: 600; 310; 30; 20 INJECTION, SOLUTION INTRAVENOUS at 08:47

## 2019-04-19 RX ADMIN — ONDANSETRON 4 MG: 2 INJECTION INTRAMUSCULAR; INTRAVENOUS at 10:20

## 2019-04-19 RX ADMIN — LIDOCAINE HYDROCHLORIDE 0.5 ML: 10 INJECTION, SOLUTION EPIDURAL; INFILTRATION; INTRACAUDAL; PERINEURAL at 08:47

## 2019-04-19 RX ADMIN — DEXAMETHASONE SODIUM PHOSPHATE 4 MG: 4 INJECTION, SOLUTION INTRAMUSCULAR; INTRAVENOUS at 10:15

## 2019-04-19 RX ADMIN — PROPOFOL 150 MG: 10 INJECTION, EMULSION INTRAVENOUS at 10:11

## 2019-04-19 RX ADMIN — PROPOFOL 88 MCG/KG/MIN: 10 INJECTION, EMULSION INTRAVENOUS at 10:12

## 2019-04-19 RX ADMIN — FAMOTIDINE 20 MG: 20 TABLET ORAL at 08:53

## 2019-04-19 RX ADMIN — CEFAZOLIN SODIUM 2 G: 2 INJECTION, SOLUTION INTRAVENOUS at 10:09

## 2019-04-19 NOTE — OP NOTE
Su Pedraza  [id number]  1940    Date of Surgery:  4/19/2019 10:59 AM       Pre-op Diagnosis: Stage IV colon cancer    Post-op Diagnosis: Same     Procedure(s):  1.  Placement of right internal jugular port device  2.  Fluoroscopic guidance    Surgeon(s):  Rosalinda Atkinson MD    Assistant(s):  German Hoffman MD     Anesthesia: General with LMA     Estimated Blood Loss: Minimal     Urine Voided: Not measured      Operative Indications:  Mrs. Pedraza is an 79-year-old female with a history of poorly differentiated right colon adenocarcinoma status post right hemicolectomy with a final pathologic stage of IIIa..  She was started on adjuvant systemic therapy with progression of disease to liver metastases.  She is currently on palliative chemotherapy, and is in need of more durable venous access.  The plan is for a right internal jugular vein port placement.  I discussed the risks and benefits of port placement, including but not limited to: bleeding, infection, injury to adjacent vessels (i.e.. carotid artery) and organs (lung), port malfunction, misplaced port contents, need for additional procedures or surgeries, cardiopulmonary issues, and deep venous thrombosis.  The patient understood these risks and agreed to proceed.     Operative Findings/Interpretation:  1.  Fluoroscopic guidance.  Guidewire was placed into the central venous system using fluoroscopy.  Placement of the sheath and catheter was also performed with fluoroscopic guidance.  Final position of the catheter tip at the cavoatrial junction was confirmed with fluoroscopic imaging.  This represents appropriate positioning of a port device.    Operative Procedure:  After informed consent was obtained, the patient was taken to the operating room. Sequential compression devices were placed, and general anesthesia was administered using an LMA.  The patient was placed in the supine position on the operating table with a shoulder roll positioned under  the scapula and with the head tilted to the left side.  Perioperative antibiotics were administered.  The neck and upper chest was prepped and draped in the standard sterile fashion.  A timeout was performed indicating the patient's name and intended procedure.     The patient was placed in the Trendelenburg position, and the ultrasound was used to visualize the internal jugular vein.  It was compressible and free of thrombus.  Under ultrasound guidance, an 18-gauge needle was used to gain entry into the internal jugular vein.  The wire was inserted into the needle, and entry into the venous system was confirmed by fluoroscopy.  The skin along the chest was incised sharply after injection with local anesthetic, and the pocket on the chest for the port was created.  A skin incision was made at the wire insertion site, and a hemostat was used to gently dissect alongside the wire in the direction of the subcutaneous tunnel. The catheter was placed on the tunneling device, and a tunnel from the port site to the internal jugular vein was created.  The dilator and sheath were inserted over the wire and into the vein under fluoroscopic guidance.  The wire and dilator were removed, and the catheter was placed within the sheath, which was peeled away.  The catheter tip was pulled back to the cavoatrial junction under flouroscopic guidance.  The device was secured to the underlying muscular fascia with 3-0 Prolene sutures at the 3 and 9 o'clock positions.  The port was aspirated and flushed.  Fluoroscopy was then used to verify the final position of the port, and there were no kinks noted along the catheter. The pocket was closed with interrupted 3-0 Vicryl in a deep dermal fashion followed by a running 4-0 Vicryl subcuticular stitch.  The skin overlying the venous puncture site was closed with interrupted 4-0 Vicryl  The port incision was dressed with Steri-Strips and a sterile dressing. The patient was awakened from  anesthesia without difficulty and transported to the PACU.  Postoperative chest x-ray is pending.

## 2019-04-19 NOTE — ANESTHESIA POSTPROCEDURE EVALUATION
Patient: Su Pedraza    Procedure Summary     Date:  04/19/19 Room / Location:   [facility] / [facility]    Anesthesia Start:  1004 Anesthesia Stop:  1059    Procedure:  PORT PLACEMENT (Right ) Diagnosis:       Colon cancer metastasized to lung (CMS/HCC)      (Colon Cancer)    Surgeon:  Rosalinda Atkinson MD Provider:  Marco Dawson MD    Anesthesia Type:  general ASA Status:  3          Anesthesia Type: general  Last vitals  BP   151/75   Temp   97.4   Pulse   67   Resp   16   SpO2   98     Post Anesthesia Care and Evaluation    Patient location during evaluation: PACU  Patient participation: complete - patient participated  Level of consciousness: awake and alert  Pain score: 0  Pain management: adequate  Airway patency: patent  Anesthetic complications: No anesthetic complications  PONV Status: none  Cardiovascular status: hemodynamically stable and acceptable  Respiratory status: nonlabored ventilation, acceptable and nasal cannula  Hydration status: acceptable

## 2019-04-19 NOTE — ANESTHESIA PREPROCEDURE EVALUATION
Anesthesia Evaluation     Patient summary reviewed and Nursing notes reviewed   no history of anesthetic complications:  NPO Solid Status: > 8 hours  NPO Liquid Status: > 2 hours           Airway   Mallampati: II  TM distance: >3 FB  Neck ROM: full  Possible difficult intubation  Dental    (+) upper dentures and lower dentures    Pulmonary    (+) decreased breath sounds,   Cardiovascular   Exercise tolerance: poor (<4 METS)    ECG reviewed  Rhythm: regular  Rate: normal    (+) hypertension well controlled less than 2 medications,       Neuro/Psych  GI/Hepatic/Renal/Endo    (+)  GERD,  liver disease,     Musculoskeletal     Abdominal   (-) obese    Abdomen: soft.   Substance History      OB/GYN          Other   (+) blood dyscrasia, arthritis   history of cancer active    ROS/Med Hx Other: ANEMIA                  Anesthesia Plan    ASA 3     general   (LMA)  intravenous induction   Anesthetic plan, all risks, benefits, and alternatives have been provided, discussed and informed consent has been obtained with: patient.    Plan discussed with CRNA.

## 2019-04-19 NOTE — DISCHARGE INSTRUCTIONS
1.  You may shower in 48 hours.  Gently rinse your incisions with soapy water.  Do not immerse your incisions in water (i.e...tub baths, hot tubs, etc...) until cleared by a physician.  The skin glue will eventually peel off.  2.  You may supplement your narcotic pain medications with Ibuprofen.  Use ice packs to minimize pain and swelling for the first 24 hours ater surgery.  Take a stool softener when taking narcotic pain medications.  3.  Do not drive while on narcotic pain medications.  4.  No heavy lifting greater than 10 pounds for 2 weeks after your surgery.  No strenuous activity (i.e... pushing, pulling, etc...) for 2 weeks after your surgery.  5.  Call the office for any fevers greater than 101.5, pain not relieved by pain medications, redness/swelling/drainage from your incisions, shortness of breath, or any other concerning signs or symptoms.  6.  Follow up with Dr. Atkinson at [facility] if any of the above symptoms are present.

## 2019-04-19 NOTE — H&P
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   She was started on adjuvant chemotherapy using oxaliplatin with Xeloda September 25, 2018.  The patient completed 5 cycles and then decide to stop secondary to multiple side effects.  Repeated scans done on March 16, 2019 revealed new liver metastases.  She was started on palliative chemotherapy with FOLFIRI plus Avastin March 25, 2019.  She is here today for port placement for venous access.     Review of Systems:  General ROS: negative for fever, chills, weakness, dizziness, headache, fatigue, weight changes  Cardiovascular ROS: no chest pain or dyspnea on exertion  Respiratory ROS: no cough, shortness of breath, or wheezing  GI ROS: no abdominal pain/discomfort, nausea, vomiting or diarrhea   ROS: no dysuria, hematuria or complaints  Skin ROS: no itching, rash or open wounds.      Allergies:   Allergies   Allergen Reactions   • Adhesive Tape Rash   • Codeine Sulfate Nausea And Vomiting   Latex: no known allergy  Contrast Dye:  no known allergy      Home Meds    Medications Prior to Admission   Medication Sig Dispense Refill Last Dose   • atenolol (TENORMIN) 50 MG tablet Take 50 mg by mouth Daily.   4/19/2019 at 0530   • megestrol (MEGACE) 40 MG/ML suspension Take 20 mL by mouth Daily. 480 mL 5 4/18/2019 at 0800   • ondansetron (ZOFRAN) 8 MG tablet Take 1 tablet by mouth Every 8 (Eight) Hours As Needed for Nausea. 60 tablet 5 4/18/2019 at 0800   • HYDROcodone-acetaminophen (NORCO)  MG per tablet Take 1 tablet by mouth Every 6 (Six) Hours As Needed for Moderate Pain . 120 tablet 0 3/22/2019   • lidocaine-prilocaine (EMLA) 2.5-2.5 % cream Apply  topically to the appropriate area as directed As Needed (45-60 minutes prior to port access.  Cover with saran/plastic wrap.). 30 g 3 More than a month  "at Unknown time   • ondansetron ODT (ZOFRAN-ODT) 4 MG disintegrating tablet Take 1 tablet by mouth Every 6 (Six) Hours As Needed for Nausea or Vomiting. 15 tablet 0 3/29/2019   • raNITIdine (ZANTAC) 150 MG tablet Take 150 mg by mouth As Needed for Heartburn.   More than a month at Unknown time     PMH:   Past Medical History:   Diagnosis Date   • Arthritis     hands and legs    • Cancer (CMS/HCC)     colon cancer, uterine cancer   • Colon cancer (CMS/HCC)    • Full dentures    • GERD (gastroesophageal reflux disease)    • Hypertension    • Liver metastases (CMS/HCC)      PSH:    Past Surgical History:   Procedure Laterality Date   • APPENDECTOMY     • CARPAL TUNNEL RELEASE      bilat    • CATARACT EXTRACTION Bilateral    • CHOLECYSTECTOMY     • COLON RESECTION Right 8/1/2018    Procedure: extended right hemicolectomy with end bloc node dissection;  Surgeon: Clovis Juarez MD;  Location: [facility] OR;  Service: General   • COLONOSCOPY     • ENDOSCOPY     • HYSTERECTOMY      partial still have both ovaries    • MULTIPLE TOOTH EXTRACTIONS     • TONSILLECTOMY AND ADENOIDECTOMY     • VENOUS ACCESS DEVICE (PORT) INSERTION N/A 9/20/2018    Procedure: PORT PLACEMENT;  Surgeon: Dereck Hogue MD;  Location:  [facility];  Service: General   • VENOUS ACCESS DEVICE (PORT) REMOVAL     • WISDOM TOOTH EXTRACTION       Immunization History: Pneumonia=yes         Influenza=yes         Tetanus=unknown     Social History:  Social History     Tobacco Use   • Smoking status: Never Smoker   • Smokeless tobacco: Never Used   Substance Use Topics   • Alcohol use: No   illicit drug use=no         Physical Exam:/84 (BP Location: Right arm, Patient Position: Lying)   Pulse 63   Temp 98.5 °F (36.9 °C) (Temporal)   Resp 16   Ht 160 cm (63\")   Wt 62.1 kg (137 lb)   SpO2 98%   BMI 24.27 kg/m²       General Appearance:    Alert, cooperative, no distress, appears stated age   Head:    Normocephalic, without obvious abnormality, " atraumatic   Lungs:     Clear to auscultation bilaterally, respirations unlabored    Heart: Regular rate and rhythm, S1 and S2 normal, no murmur, rub    or gallop    Abdomen:    Soft without tenderness, non-distended, normal bowel sounds, no mass palpated.   Breast Exam:    deferred   Genitalia:    deferred   Extremities:   Extremities normal, atraumatic, no cyanosis or edema   Skin:   Skin color, texture, turgor normal, no rashes or lesions   Neurologic:   Grossly intact     Results Review:  I have reviewed the patient's current clinical lab results.     Cancer Staging:   Cancer Patient: X yes; Poorly differentiated ascending colon adenocarcinoma T3N1M0 stage IIIA:       Impression:  Colon Cancer on chemotherapy, in need of long term venous access    Plan:  Port Placement       DANNI Underwood 4/19/2019 9:33 AM    I have reviewed the above note, appropriate imaging, and labs.  The patient has had a prior left sided port and right sided PICC line.  She denies a history of deep venous thrombosis.  I have discussed the risks and benefits of port placement with the patient.  All of her questions have been answered.  She understands and wishes to proceed.    Rosalinda Atkinson MD

## 2019-04-19 NOTE — ANESTHESIA PROCEDURE NOTES
Airway  Urgency: elective    Airway not difficult    General Information and Staff    Patient location during procedure: OR  CRNA: Cadence Masterson CRNA    Indications and Patient Condition  Indications for airway management: airway protection    Preoxygenated: yes  Mask difficulty assessment: 1 - vent by mask    Final Airway Details  Final airway type: supraglottic airway      Successful airway: I-gel  Size 4    Number of attempts at approach: 1    Additional Comments  LMA placed without difficulty, ventilation with assist, equal breath sounds and symmetric chest rise and fall

## 2019-04-19 NOTE — BRIEF OP NOTE
Brief Operative Note    Su Pedraza    Date of Surgery: 4/19/2019    Pre-op Diagnosis:   Colon Cancer       Post-Op Diagnosis:  Colon cancer metastasized to lung       Procedure(s):  1.  Right internal jugular vein port placement  2.  Direct fluoroscopy    Surgeon(s):  Rosalinda Atkinson MD    Assistant(s):  German Hoffman MD    Anesthesia: Monitor Anesthesia Care    Staff:   Circulator: Hoa Mauricio RN; Emelina Herman RN  Radiology Technologist: Dennis Farris  Scrub Person: Celia Junior    Estimated Blood Loss: Minimal    Urine Voided: Not measured    Complications: None apparent      Rosalinda Atkinson MD     Date: 4/19/2019  Time: 10:58 AM

## 2019-04-22 ENCOUNTER — HOSPITAL ENCOUNTER (OUTPATIENT)
Dept: ONCOLOGY | Facility: HOSPITAL | Age: 79
Setting detail: INFUSION SERIES
Discharge: HOME | End: 2019-04-22

## 2019-04-22 ENCOUNTER — DOCUMENTATION (OUTPATIENT)
Dept: ONCOLOGY | Facility: CLINIC | Age: 79
End: 2019-04-22

## 2019-04-22 ENCOUNTER — OFFICE VISIT (OUTPATIENT)
Dept: ONCOLOGY | Facility: CLINIC | Age: 79
End: 2019-04-22

## 2019-04-22 VITALS
TEMPERATURE: 97.7 F | SYSTOLIC BLOOD PRESSURE: 152 MMHG | HEART RATE: 80 BPM | DIASTOLIC BLOOD PRESSURE: 80 MMHG | RESPIRATION RATE: 20 BRPM | HEIGHT: 63 IN | WEIGHT: 141 LBS | BODY MASS INDEX: 24.98 KG/M2

## 2019-04-22 VITALS — DIASTOLIC BLOOD PRESSURE: 59 MMHG | SYSTOLIC BLOOD PRESSURE: 147 MMHG

## 2019-04-22 DIAGNOSIS — C18.9 MALIGNANT NEOPLASM OF COLON, UNSPECIFIED PART OF COLON (HCC): Primary | ICD-10-CM

## 2019-04-22 LAB
ALBUMIN SERPL-MCNC: 3.5 G/DL (ref 3.5–5.2)
ALBUMIN/GLOB SERPL: 0.9 G/DL
ALP SERPL-CCNC: 126 U/L (ref 39–117)
ALT SERPL W P-5'-P-CCNC: 11 U/L (ref 1–33)
ANION GAP SERPL CALCULATED.3IONS-SCNC: 10 MMOL/L
AST SERPL-CCNC: 17 U/L (ref 1–32)
BILIRUB SERPL-MCNC: 0.3 MG/DL (ref 0.2–1.2)
BILIRUB UR QL STRIP: NEGATIVE
BUN BLD-MCNC: 13 MG/DL (ref 8–23)
BUN/CREAT SERPL: 16.7 (ref 7–25)
CALCIUM SPEC-SCNC: 8.3 MG/DL (ref 8.6–10.5)
CEA SERPL-MCNC: 28.1 NG/ML
CHLORIDE SERPL-SCNC: 103 MMOL/L (ref 98–107)
CLARITY UR: CLEAR
CO2 SERPL-SCNC: 22 MMOL/L (ref 22–29)
COLOR UR: YELLOW
CREAT BLD-MCNC: 0.78 MG/DL (ref 0.57–1)
ERYTHROCYTE [DISTWIDTH] IN BLOOD BY AUTOMATED COUNT: 17.6 % (ref 12.3–15.4)
GFR SERPL CREATININE-BSD FRML MDRD: 71 ML/MIN/1.73
GLOBULIN UR ELPH-MCNC: 4.1 GM/DL
GLUCOSE BLD-MCNC: 117 MG/DL (ref 65–99)
GLUCOSE UR STRIP-MCNC: NEGATIVE MG/DL
HCT VFR BLD AUTO: 32.3 % (ref 34–46.6)
HGB BLD-MCNC: 10.8 G/DL (ref 12–15.9)
HGB UR QL STRIP.AUTO: ABNORMAL
KETONES UR QL STRIP: NEGATIVE
LEUKOCYTE ESTERASE UR QL STRIP.AUTO: NEGATIVE
LYMPHOCYTES # BLD AUTO: 9.2 10*3/MM3 (ref 0.7–3.1)
LYMPHOCYTES NFR BLD AUTO: 55.9 % (ref 19.6–45.3)
MCH RBC QN AUTO: 29.8 PG (ref 26.6–33)
MCHC RBC AUTO-ENTMCNC: 33.5 G/DL (ref 31.5–35.7)
MCV RBC AUTO: 88.9 FL (ref 79–97)
MONOCYTES # BLD AUTO: 1.1 10*3/MM3 (ref 0.1–0.9)
MONOCYTES NFR BLD AUTO: 6.8 % (ref 5–12)
NEUTROPHILS # BLD AUTO: 6.2 10*3/MM3 (ref 1.7–7)
NEUTROPHILS NFR BLD AUTO: 37.3 % (ref 42.7–76)
NITRITE UR QL STRIP: NEGATIVE
PH UR STRIP.AUTO: 6.5 [PH] (ref 5–8)
PLATELET # BLD AUTO: 207 10*3/MM3 (ref 140–450)
PMV BLD AUTO: 7.8 FL (ref 6–12)
POTASSIUM BLD-SCNC: 4.1 MMOL/L (ref 3.5–5.2)
PROT SERPL-MCNC: 7.6 G/DL (ref 6–8.5)
PROT UR QL STRIP: NEGATIVE
RBC # BLD AUTO: 3.64 10*6/MM3 (ref 3.77–5.28)
SODIUM BLD-SCNC: 135 MMOL/L (ref 136–145)
SP GR UR STRIP: 1 (ref 1–1.03)
UROBILINOGEN UR QL STRIP: ABNORMAL
WBC NRBC COR # BLD: 16.5 10*3/MM3 (ref 3.4–10.8)

## 2019-04-22 PROCEDURE — 25010000002 IRINOTECAN PER 20 MG: Performed by: NURSE PRACTITIONER

## 2019-04-22 PROCEDURE — 25010000002 PALONOSETRON PER 25 MCG: Performed by: NURSE PRACTITIONER

## 2019-04-22 PROCEDURE — 85025 COMPLETE CBC W/AUTO DIFF WBC: CPT | Performed by: NURSE PRACTITIONER

## 2019-04-22 PROCEDURE — 80053 COMPREHEN METABOLIC PANEL: CPT | Performed by: NURSE PRACTITIONER

## 2019-04-22 PROCEDURE — 25010000002 DEXAMETHASONE PER 1 MG: Performed by: NURSE PRACTITIONER

## 2019-04-22 PROCEDURE — 81003 URINALYSIS AUTO W/O SCOPE: CPT | Performed by: INTERNAL MEDICINE

## 2019-04-22 PROCEDURE — 96375 TX/PRO/DX INJ NEW DRUG ADDON: CPT

## 2019-04-22 PROCEDURE — 96417 CHEMO IV INFUS EACH ADDL SEQ: CPT

## 2019-04-22 PROCEDURE — 25010000002 LEUCOVORIN 500 MG RECONSTITUTED SOLUTION 1 EACH VIAL: Performed by: NURSE PRACTITIONER

## 2019-04-22 PROCEDURE — 25010000002 LEUCOVORIN 200 MG RECONSTITUTED SOLUTION 1 EACH VIAL: Performed by: NURSE PRACTITIONER

## 2019-04-22 PROCEDURE — 25010000002 BEVACIZUMAB 100 MG/4ML SOLUTION 4 ML VIAL: Performed by: NURSE PRACTITIONER

## 2019-04-22 PROCEDURE — 99214 OFFICE O/P EST MOD 30 MIN: CPT | Performed by: NURSE PRACTITIONER

## 2019-04-22 PROCEDURE — 96415 CHEMO IV INFUSION ADDL HR: CPT

## 2019-04-22 PROCEDURE — 96413 CHEMO IV INFUSION 1 HR: CPT

## 2019-04-22 PROCEDURE — 96416 CHEMO PROLONG INFUSE W/PUMP: CPT

## 2019-04-22 PROCEDURE — 96411 CHEMO IV PUSH ADDL DRUG: CPT

## 2019-04-22 PROCEDURE — 25010000002 FLUOROURACIL PER 500 MG: Performed by: NURSE PRACTITIONER

## 2019-04-22 PROCEDURE — 96368 THER/DIAG CONCURRENT INF: CPT

## 2019-04-22 PROCEDURE — 82378 CARCINOEMBRYONIC ANTIGEN: CPT | Performed by: NURSE PRACTITIONER

## 2019-04-22 RX ORDER — SODIUM CHLORIDE 9 MG/ML
250 INJECTION, SOLUTION INTRAVENOUS ONCE
Status: DISCONTINUED | OUTPATIENT
Start: 2019-04-22 | End: 2019-04-23 | Stop reason: HOSPADM

## 2019-04-22 RX ORDER — FLUOROURACIL 50 MG/ML
400 INJECTION, SOLUTION INTRAVENOUS ONCE
Status: CANCELLED | OUTPATIENT
Start: 2019-04-22

## 2019-04-22 RX ORDER — PALONOSETRON 0.05 MG/ML
0.25 INJECTION, SOLUTION INTRAVENOUS ONCE
Status: COMPLETED | OUTPATIENT
Start: 2019-04-22 | End: 2019-04-22

## 2019-04-22 RX ORDER — FLUOROURACIL 50 MG/ML
400 INJECTION, SOLUTION INTRAVENOUS ONCE
Status: COMPLETED | OUTPATIENT
Start: 2019-04-22 | End: 2019-04-22

## 2019-04-22 RX ORDER — PALONOSETRON 0.05 MG/ML
0.25 INJECTION, SOLUTION INTRAVENOUS ONCE
Status: CANCELLED | OUTPATIENT
Start: 2019-04-22

## 2019-04-22 RX ORDER — ATROPINE SULFATE 1 MG/ML
0.25 INJECTION, SOLUTION INTRAMUSCULAR; INTRAVENOUS; SUBCUTANEOUS
Status: DISCONTINUED | OUTPATIENT
Start: 2019-04-22 | End: 2019-04-23 | Stop reason: HOSPADM

## 2019-04-22 RX ORDER — ATROPINE SULFATE 1 MG/ML
0.25 INJECTION, SOLUTION INTRAMUSCULAR; INTRAVENOUS; SUBCUTANEOUS
Status: CANCELLED | OUTPATIENT
Start: 2019-04-22

## 2019-04-22 RX ORDER — SODIUM CHLORIDE 9 MG/ML
250 INJECTION, SOLUTION INTRAVENOUS ONCE
Status: CANCELLED | OUTPATIENT
Start: 2019-04-22

## 2019-04-22 RX ADMIN — BEVACIZUMAB 300 MG: 100 INJECTION, SOLUTION INTRAVENOUS at 12:34

## 2019-04-22 RX ADMIN — FLUOROURACIL 4000 MG: 50 INJECTION, SOLUTION INTRAVENOUS at 14:52

## 2019-04-22 RX ADMIN — DEXAMETHASONE SODIUM PHOSPHATE 12 MG: 4 INJECTION, SOLUTION INTRAMUSCULAR; INTRAVENOUS at 12:19

## 2019-04-22 RX ADMIN — FLUOROURACIL 660 MG: 50 INJECTION, SOLUTION INTRAVENOUS at 14:49

## 2019-04-22 RX ADMIN — DEXTROSE MONOHYDRATE 300 MG: 50 INJECTION, SOLUTION INTRAVENOUS at 13:09

## 2019-04-22 RX ADMIN — LEUCOVORIN CALCIUM 660 MG: 500 INJECTION, POWDER, LYOPHILIZED, FOR SOLUTION INTRAMUSCULAR; INTRAVENOUS at 13:09

## 2019-04-22 RX ADMIN — PALONOSETRON HYDROCHLORIDE 0.25 MG: 0.25 INJECTION, SOLUTION INTRAVENOUS at 12:18

## 2019-04-22 NOTE — PROGRESS NOTES
Confirmed with [facility] that patient is set to have bulb d/c and port flushed on wednesday

## 2019-04-22 NOTE — PROGRESS NOTES
DATE OF VISIT: 4/22/2019    REASON FOR VISIT: Followup for Poorly differentiated ascending colon adenocarcinoma T3N1M0 stage IIIA.     HISTORY OF PRESENT ILLNESS: The patient is a very pleasant 79 y.o. female  with past medical history significant for colon cancer diagnosed 07/04/18. The patient presented to [facility] for gastritis and right lower quadrant pain.  This has been going on for the last few months associated with nausea but no vomiting.  She's been having one of dark stools.  Never had this problem before.  She had previous negative colonoscopy.  Colon polyp was found and biopsied. Surgical pathology 07/04/18 revealed invasive poorly differentiated adenocarcinoma in ascending colon. CT abdomen and pelvis 07/06/18 showed mass within the ascending colon with large mercy mass in the adjacent mesentery and prominent nodes around the cecum. The patient presented to Dr Juarez for elective right hemicolectomy on 08/01/18. Pathology 08/01/18 from colon and terminal ileum resection revealed poorly differentiated colonic adenocarcinoma ([id number]) with metastatic adenocarcinoma to 1 of 25 lymph nodes.  She was started on adjuvant chemotherapy using oxaliplatin with Xeloda September 25, 2018.  The patient completed 5 cycles and then decide to stop secondary to multiple side effects.  Repeated scans done on March 16, 2019 revealed new liver metastases.  She was started on palliative chemotherapy with FOLFIRI plus Avastin March 25, 2019.  The patient is here today for follow up visit was cycle #3.        SUBJECTIVE: The patient is here today with her niece. She has been feeling fairly well. She has not needed any pain medication recently, and she has been eating better. She has gained some weight since her last visit. She has not had much nausea or vomiting. She takes a Zofran tablet daily and this seems to help. She has some skin lesions that have popped up to her arms and face. They are red  and raised, but go away on their own. They are tender at times, but do no itch. She denies changes to her hands or feet.     PAST MEDICAL HISTORY/SOCIAL HISTORY/FAMILY HISTORY: Reviewed by me and unchanged from my documentation done on 09/17/18.    Review of Systems   Constitutional: Positive for fatigue. Negative for activity change, appetite change, chills, fever and unexpected weight change.   HENT: Negative for hearing loss, mouth sores, nosebleeds, sore throat and trouble swallowing.    Eyes: Negative for visual disturbance.   Respiratory: Negative for cough, chest tightness, shortness of breath and wheezing.    Cardiovascular: Negative for chest pain, palpitations and leg swelling.   Gastrointestinal: Positive for diarrhea. Negative for abdominal distention, abdominal pain, blood in stool, constipation, nausea, rectal pain and vomiting.   Endocrine: Negative for cold intolerance and heat intolerance.   Genitourinary: Negative for difficulty urinating, dysuria, frequency and urgency.   Musculoskeletal: Negative for arthralgias, back pain, gait problem, joint swelling and myalgias.   Skin: Positive for rash.   Neurological: Negative for dizziness, tremors, syncope, weakness, light-headedness, numbness and headaches.   Hematological: Negative for adenopathy. Does not bruise/bleed easily.   Psychiatric/Behavioral: Negative for confusion, sleep disturbance and suicidal ideas. The patient is not nervous/anxious.          Current Outpatient Medications:   •  atenolol (TENORMIN) 50 MG tablet, Take 50 mg by mouth Daily., Disp: , Rfl:   •  HYDROcodone-acetaminophen (NORCO)  MG per tablet, Take 1 tablet by mouth Every 6 (Six) Hours As Needed for Moderate Pain ., Disp: 120 tablet, Rfl: 0  •  HYDROcodone-acetaminophen (NORCO) 5-325 MG per tablet, Take 1-2 tablets by mouth Every 6 (Six) Hours As Needed for pain, Disp: 10 tablet, Rfl: 0  •  lidocaine-prilocaine (EMLA) 2.5-2.5 % cream, Apply  topically to the  appropriate area as directed As Needed (45-60 minutes prior to port access.  Cover with saran/plastic wrap.)., Disp: 30 g, Rfl: 3  •  megestrol (MEGACE) 40 MG/ML suspension, Take 20 mL by mouth Daily., Disp: 480 mL, Rfl: 5  •  ondansetron (ZOFRAN) 8 MG tablet, Take 1 tablet by mouth Every 8 (Eight) Hours As Needed for Nausea., Disp: 60 tablet, Rfl: 5  •  ondansetron ODT (ZOFRAN-ODT) 4 MG disintegrating tablet, Take 1 tablet by mouth Every 6 (Six) Hours As Needed for Nausea or Vomiting., Disp: 15 tablet, Rfl: 0  •  raNITIdine (ZANTAC) 150 MG tablet, Take 150 mg by mouth As Needed for Heartburn., Disp: , Rfl:     PHYSICAL EXAMINATION:   There were no vitals taken for this visit.   ECOG Performance Status: 1 - Symptomatic but completely ambulatory  General Appearance:  alert, cooperative, no apparent distress and appears stated age   Neurologic/Psychiatric: A&O x 3, gait steady, appropriate affect, strength 5/5 in all muscle groups   HEENT:  Normocephalic, without obvious abnormality, mucous membranes moist   Neck: Supple, symmetrical, trachea midline, no adenopathy;  No thyromegaly, masses, or tenderness   Lungs:   Clear to auscultation bilaterally; respirations regular, even, and unlabored bilaterally   Heart:  Regular rate and rhythm, no murmurs appreciated   Abdomen:   Soft, non-tender, non-distended and no organomegaly   Lymph nodes: No cervical, supraclavicular, inguinal or axillary adenopathy noted   Extremities: Normal, atraumatic; no clubbing, cyanosis, or edema    Skin: No rashes, ulcers, or suspicious lesions noted     Admission on 04/19/2019, Discharged on 04/19/2019   Component Date Value Ref Range Status   • Potassium 04/19/2019 4.1  3.5 - 5.2 mmol/L Final        Xr Chest 1 View    Result Date: 4/19/2019  Narrative: EXAMINATION: XR CHEST 1 VW-  INDICATION: Pneumothorax.  COMPARISON: None.  FINDINGS: A Port-A-Cath is well positioned. There is no pneumothorax. Cardiac silhouette is normal and there are  chronic pulmonary changes.         Impression: A Port-A-Cath has been inserted on the right and is well positioned. There is no pneumothorax.  D:  04/19/2019 E:  04/19/2019  This report was finalized on 4/19/2019 4:22 PM by Dr. Adonay Reese MD.      Fl C Arm During Surgery    Result Date: 4/19/2019  Narrative: EXAMINATION: FL C-ARM DURING SURGERY-04/19/2019:  INDICATION: Port, Port-A-Catheter placement.  COMPARISON: NONE.  FINDINGS: 38 seconds of fluoroscopy and 3 images used for Port-A-Catheter placement on the right. Please see the procedure report for full details.      Impression: Fluoroscopy used for Port-A-Catheter placement. Please see the procedure report for full details.  D:  04/19/2019 E:  04/19/2019     This report was finalized on 4/19/2019 4:59 PM by Dr. Eden Grant MD.        ASSESSMENT: The patient is a very pleasant 79 y.o. 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  Ascending colon adenocarcinoma T3 N1 M0 stage IIIa:  A.  Presenting with abdominal pain and irregular bowel movements  B.  Diagnosed after colonoscopy with a biopsy done on June 27, 2018  C.  Status post right hemicolectomy done by Dr. Wong August 01, 2018  D.  Final pathology revealed with 1 out of 25 positive mesenteric lymph nodes  E. Started adjuvant chemotherapy using Xeloda with oxaliplatin September 25, 2018, status post 5 cycles  F. CEA 17.8 March 25, 2019  G.  Progressive disease with diffuse liver metastases dominant CAT scan done on March 2019  H. Started on palliative chemotherapy with FOLFIRI plus Avastin March 25, 2019, status post 2 cycles.  I.  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  2.  Hypertension  3.  Normocytic anemia  4.  Two hypodense right hepatic lobe liver  lesions.  5.  Chemotherapy-induced nausea       PLAN:   1. I will proceed with palliative chemotherapy with FOLFIRI plus Avastin as scheduled today cycle #3.  2.   I will monitor patient blood work including blood counts, kidney function, liver functions, and electrolytes.  We will also check CEA on day 1 of each cycle. I told her that it increased some last time, but we will continue to follow up it with each treatment.   3.  I will continue routine port care with each cycle of chemotherapy. She has EMLA cream to use prior to port access.   4.  The patient had microsatellite instability identified on gene analysis. We will consider immunotherapy in the future if needed if she fails second like treatment.   5. The patient and her family have been made aware of the possibility of Benton syndrome. She was not interested in referral to genetics for testing and counseling at this time.   6.  We discussed potential side effects of chemotherapy including diarrhea colitis low blood counts infusion reactions dehydration infection, and potential death.  7.  I will continue her pain medicine to Lortab 10/325 mg every 6 hours as needed for cancer related pain.  8.  I will continue the patient on Zofran as needed for nausea.  9.  She will follow-up with me in 3 weeks for cycle #4.  10.  I will repeat the patient's scans before cycle #5.  11.  We will monitor the patient blood pressure we may have to adjust her treatment if needed.  12. Regarding her skin lesions, I told her to monitor this for worsening symptoms. She can use hydrocortisone cream as needed for swelling. I also told her to keep her skim moisturized.     Liana Cervantes, APRN  4/22/2019

## 2019-05-06 ENCOUNTER — OFFICE VISIT (OUTPATIENT)
Dept: ONCOLOGY | Facility: CLINIC | Age: 79
End: 2019-05-06

## 2019-05-06 ENCOUNTER — HOSPITAL ENCOUNTER (OUTPATIENT)
Dept: ONCOLOGY | Facility: HOSPITAL | Age: 79
Setting detail: INFUSION SERIES
Discharge: HOME | End: 2019-05-06

## 2019-05-06 VITALS — DIASTOLIC BLOOD PRESSURE: 76 MMHG | SYSTOLIC BLOOD PRESSURE: 140 MMHG

## 2019-05-06 VITALS
HEIGHT: 63 IN | DIASTOLIC BLOOD PRESSURE: 88 MMHG | BODY MASS INDEX: 24.45 KG/M2 | RESPIRATION RATE: 16 BRPM | HEART RATE: 73 BPM | TEMPERATURE: 97.8 F | WEIGHT: 138 LBS | SYSTOLIC BLOOD PRESSURE: 187 MMHG

## 2019-05-06 DIAGNOSIS — C18.9 MALIGNANT NEOPLASM OF COLON, UNSPECIFIED PART OF COLON (HCC): Primary | ICD-10-CM

## 2019-05-06 LAB
ALBUMIN SERPL-MCNC: 3.7 G/DL (ref 3.5–5.2)
ALBUMIN/GLOB SERPL: 0.9 G/DL
ALP SERPL-CCNC: 110 U/L (ref 39–117)
ALT SERPL W P-5'-P-CCNC: 9 U/L (ref 1–33)
ANION GAP SERPL CALCULATED.3IONS-SCNC: 13 MMOL/L
AST SERPL-CCNC: 15 U/L (ref 1–32)
BILIRUB SERPL-MCNC: 0.4 MG/DL (ref 0.2–1.2)
BILIRUB UR QL STRIP: NEGATIVE
BUN BLD-MCNC: 19 MG/DL (ref 8–23)
BUN/CREAT SERPL: 19.8 (ref 7–25)
CALCIUM SPEC-SCNC: 8.9 MG/DL (ref 8.6–10.5)
CEA SERPL-MCNC: 25.4 NG/ML
CHLORIDE SERPL-SCNC: 100 MMOL/L (ref 98–107)
CLARITY UR: CLEAR
CO2 SERPL-SCNC: 19 MMOL/L (ref 22–29)
COLOR UR: YELLOW
CREAT BLD-MCNC: 0.96 MG/DL (ref 0.57–1)
ERYTHROCYTE [DISTWIDTH] IN BLOOD BY AUTOMATED COUNT: 19.5 % (ref 12.3–15.4)
GFR SERPL CREATININE-BSD FRML MDRD: 56 ML/MIN/1.73
GLOBULIN UR ELPH-MCNC: 4.1 GM/DL
GLUCOSE BLD-MCNC: 83 MG/DL (ref 65–99)
GLUCOSE UR STRIP-MCNC: NEGATIVE MG/DL
HCT VFR BLD AUTO: 34.6 % (ref 34–46.6)
HGB BLD-MCNC: 11.5 G/DL (ref 12–15.9)
HGB UR QL STRIP.AUTO: ABNORMAL
KETONES UR QL STRIP: NEGATIVE
LEUKOCYTE ESTERASE UR QL STRIP.AUTO: NEGATIVE
LYMPHOCYTES # BLD AUTO: 12.5 10*3/MM3 (ref 0.7–3.1)
LYMPHOCYTES NFR BLD AUTO: 67.5 % (ref 19.6–45.3)
MCH RBC QN AUTO: 29.8 PG (ref 26.6–33)
MCHC RBC AUTO-ENTMCNC: 33.4 G/DL (ref 31.5–35.7)
MCV RBC AUTO: 89.2 FL (ref 79–97)
MONOCYTES # BLD AUTO: 1 10*3/MM3 (ref 0.1–0.9)
MONOCYTES NFR BLD AUTO: 5.6 % (ref 5–12)
NEUTROPHILS # BLD AUTO: 5 10*3/MM3 (ref 1.7–7)
NEUTROPHILS NFR BLD AUTO: 26.9 % (ref 42.7–76)
NITRITE UR QL STRIP: NEGATIVE
PH UR STRIP.AUTO: 5 [PH] (ref 5–8)
PLATELET # BLD AUTO: 257 10*3/MM3 (ref 140–450)
PMV BLD AUTO: 7 FL (ref 6–12)
POTASSIUM BLD-SCNC: 4.6 MMOL/L (ref 3.5–5.2)
PROT SERPL-MCNC: 7.8 G/DL (ref 6–8.5)
PROT UR QL STRIP: NEGATIVE
RBC # BLD AUTO: 3.87 10*6/MM3 (ref 3.77–5.28)
SODIUM BLD-SCNC: 132 MMOL/L (ref 136–145)
SP GR UR STRIP: 1.01 (ref 1–1.03)
UROBILINOGEN UR QL STRIP: ABNORMAL
WBC NRBC COR # BLD: 18.5 10*3/MM3 (ref 3.4–10.8)

## 2019-05-06 PROCEDURE — 25010000002 LEUCOVORIN 500 MG RECONSTITUTED SOLUTION 1 EACH VIAL: Performed by: INTERNAL MEDICINE

## 2019-05-06 PROCEDURE — 96415 CHEMO IV INFUSION ADDL HR: CPT

## 2019-05-06 PROCEDURE — 25010000002 PALONOSETRON PER 25 MCG: Performed by: INTERNAL MEDICINE

## 2019-05-06 PROCEDURE — 25010000002 IRINOTECAN PER 20 MG: Performed by: INTERNAL MEDICINE

## 2019-05-06 PROCEDURE — 85025 COMPLETE CBC W/AUTO DIFF WBC: CPT | Performed by: INTERNAL MEDICINE

## 2019-05-06 PROCEDURE — 96368 THER/DIAG CONCURRENT INF: CPT

## 2019-05-06 PROCEDURE — 25010000002 DEXAMETHASONE PER 1 MG: Performed by: INTERNAL MEDICINE

## 2019-05-06 PROCEDURE — 96411 CHEMO IV PUSH ADDL DRUG: CPT

## 2019-05-06 PROCEDURE — 81003 URINALYSIS AUTO W/O SCOPE: CPT | Performed by: INTERNAL MEDICINE

## 2019-05-06 PROCEDURE — 25010000002 FLUOROURACIL PER 500 MG: Performed by: INTERNAL MEDICINE

## 2019-05-06 PROCEDURE — 96413 CHEMO IV INFUSION 1 HR: CPT

## 2019-05-06 PROCEDURE — 99215 OFFICE O/P EST HI 40 MIN: CPT | Performed by: INTERNAL MEDICINE

## 2019-05-06 PROCEDURE — 25010000002 LEUCOVORIN 200 MG RECONSTITUTED SOLUTION 1 EACH VIAL: Performed by: INTERNAL MEDICINE

## 2019-05-06 PROCEDURE — 96375 TX/PRO/DX INJ NEW DRUG ADDON: CPT

## 2019-05-06 PROCEDURE — 96417 CHEMO IV INFUS EACH ADDL SEQ: CPT

## 2019-05-06 PROCEDURE — 96416 CHEMO PROLONG INFUSE W/PUMP: CPT

## 2019-05-06 PROCEDURE — 82378 CARCINOEMBRYONIC ANTIGEN: CPT | Performed by: INTERNAL MEDICINE

## 2019-05-06 PROCEDURE — 80053 COMPREHEN METABOLIC PANEL: CPT | Performed by: INTERNAL MEDICINE

## 2019-05-06 PROCEDURE — 25010000002 BEVACIZUMAB 100 MG/4ML SOLUTION 4 ML VIAL: Performed by: INTERNAL MEDICINE

## 2019-05-06 RX ORDER — FLUOROURACIL 50 MG/ML
400 INJECTION, SOLUTION INTRAVENOUS ONCE
Status: CANCELLED | OUTPATIENT
Start: 2019-05-06

## 2019-05-06 RX ORDER — ATROPINE SULFATE 1 MG/ML
0.25 INJECTION, SOLUTION INTRAMUSCULAR; INTRAVENOUS; SUBCUTANEOUS
Status: DISCONTINUED | OUTPATIENT
Start: 2019-05-06 | End: 2019-05-07 | Stop reason: HOSPADM

## 2019-05-06 RX ORDER — ATROPINE SULFATE 1 MG/ML
0.25 INJECTION, SOLUTION INTRAMUSCULAR; INTRAVENOUS; SUBCUTANEOUS
Status: CANCELLED | OUTPATIENT
Start: 2019-05-06

## 2019-05-06 RX ORDER — SODIUM CHLORIDE 9 MG/ML
250 INJECTION, SOLUTION INTRAVENOUS ONCE
Status: CANCELLED | OUTPATIENT
Start: 2019-05-06

## 2019-05-06 RX ORDER — PALONOSETRON 0.05 MG/ML
0.25 INJECTION, SOLUTION INTRAVENOUS ONCE
Status: COMPLETED | OUTPATIENT
Start: 2019-05-06 | End: 2019-05-06

## 2019-05-06 RX ORDER — FLUOROURACIL 50 MG/ML
400 INJECTION, SOLUTION INTRAVENOUS ONCE
Status: COMPLETED | OUTPATIENT
Start: 2019-05-06 | End: 2019-05-06

## 2019-05-06 RX ORDER — SODIUM CHLORIDE 9 MG/ML
250 INJECTION, SOLUTION INTRAVENOUS ONCE
Status: COMPLETED | OUTPATIENT
Start: 2019-05-06 | End: 2019-05-06

## 2019-05-06 RX ORDER — PALONOSETRON 0.05 MG/ML
0.25 INJECTION, SOLUTION INTRAVENOUS ONCE
Status: CANCELLED | OUTPATIENT
Start: 2019-05-06

## 2019-05-06 RX ADMIN — DEXAMETHASONE SODIUM PHOSPHATE 12 MG: 4 INJECTION, SOLUTION INTRAMUSCULAR; INTRAVENOUS at 10:23

## 2019-05-06 RX ADMIN — FLUOROURACIL 660 MG: 50 INJECTION, SOLUTION INTRAVENOUS at 13:05

## 2019-05-06 RX ADMIN — SODIUM CHLORIDE 250 ML: 9 INJECTION, SOLUTION INTRAVENOUS at 13:03

## 2019-05-06 RX ADMIN — ATROPINE SULFATE 0.25 MG: 1 INJECTION, SOLUTION INTRAMUSCULAR; INTRAVENOUS; SUBCUTANEOUS at 11:24

## 2019-05-06 RX ADMIN — LEUCOVORIN CALCIUM 660 MG: 500 INJECTION, POWDER, LYOPHILIZED, FOR SOLUTION INTRAMUSCULAR; INTRAVENOUS at 11:27

## 2019-05-06 RX ADMIN — DEXTROSE MONOHYDRATE 300 MG: 50 INJECTION, SOLUTION INTRAVENOUS at 11:28

## 2019-05-06 RX ADMIN — FLUOROURACIL 4000 MG: 50 INJECTION, SOLUTION INTRAVENOUS at 13:10

## 2019-05-06 RX ADMIN — BEVACIZUMAB 300 MG: 100 INJECTION, SOLUTION INTRAVENOUS at 10:44

## 2019-05-06 RX ADMIN — PALONOSETRON HYDROCHLORIDE 0.25 MG: 0.25 INJECTION, SOLUTION INTRAVENOUS at 10:22

## 2019-05-06 NOTE — PROGRESS NOTES
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     HISTORY OF PRESENT ILLNESS: The patient is a very pleasant 79 y.o. female  with past medical history significant for colon cancer diagnosed 07/04/18. The patient presented to [facility] for gastritis and right lower quadrant pain.  This has been going on for the last few months associated with nausea but no vomiting.  She's been having one of dark stools.  Never had this problem before.  She had previous negative colonoscopy.  Colon polyp was found and biopsied. Surgical pathology 07/04/18 revealed invasive poorly differentiated adenocarcinoma in ascending colon. CT abdomen and pelvis 07/06/18 showed mass within the ascending colon with large mercy mass in the adjacent mesentery and prominent nodes around the cecum. The patient presented to Dr Juarez for elective right hemicolectomy on 08/01/18. Pathology 08/01/18 from colon and terminal ileum resection revealed poorly differentiated colonic adenocarcinoma ([id number]) with metastatic adenocarcinoma to 1 of 25 lymph nodes.  She was started on adjuvant chemotherapy using oxaliplatin with Xeloda September 25, 2018.  The patient completed 5 cycles and then decide to stop secondary to multiple side effects.  Repeated scans done on March 16, 2019 revealed new liver metastases.  She was started on palliative chemotherapy with FOLFIRI plus Avastin March 25, 2019.  The patient is here today for follow up visit was cycle #4.        SUBJECTIVE: The patient is here today with her niece.  All in all she has been doing fairly well.  She has been able to tolerate chemotherapy.  Her pain is gone she has not been requiring any pain medicine.  She has mild nausea but no vomiting.  She has mild fatigue.    PAST MEDICAL HISTORY/SOCIAL HISTORY/FAMILY HISTORY: Reviewed by me and unchanged from my documentation done on  09/17/18.    Review of Systems   Constitutional: Positive for fatigue. Negative for activity change, appetite change, chills, fever and unexpected weight change.   HENT: Negative for hearing loss, mouth sores, nosebleeds, sore throat and trouble swallowing.    Eyes: Negative for visual disturbance.   Respiratory: Negative for cough, chest tightness, shortness of breath and wheezing.    Cardiovascular: Negative for chest pain, palpitations and leg swelling.   Gastrointestinal: Positive for diarrhea and nausea. Negative for abdominal distention, abdominal pain, blood in stool, constipation, rectal pain and vomiting.   Endocrine: Negative for cold intolerance and heat intolerance.   Genitourinary: Negative for difficulty urinating, dysuria, frequency and urgency.   Musculoskeletal: Negative for arthralgias, back pain, gait problem, joint swelling and myalgias.   Skin: Positive for rash.   Neurological: Negative for dizziness, tremors, syncope, weakness, light-headedness, numbness and headaches.   Hematological: Negative for adenopathy. Does not bruise/bleed easily.   Psychiatric/Behavioral: Negative for confusion, sleep disturbance and suicidal ideas. The patient is not nervous/anxious.          Current Outpatient Medications:   •  atenolol (TENORMIN) 50 MG tablet, Take 50 mg by mouth Daily., Disp: , Rfl:   •  HYDROcodone-acetaminophen (NORCO)  MG per tablet, Take 1 tablet by mouth Every 6 (Six) Hours As Needed for Moderate Pain ., Disp: 120 tablet, Rfl: 0  •  HYDROcodone-acetaminophen (NORCO) 5-325 MG per tablet, Take 1-2 tablets by mouth Every 6 (Six) Hours As Needed for pain, Disp: 10 tablet, Rfl: 0  •  lidocaine-prilocaine (EMLA) 2.5-2.5 % cream, Apply  topically to the appropriate area as directed As Needed (45-60 minutes prior to port access.  Cover with saran/plastic wrap.)., Disp: 30 g, Rfl: 3  •  megestrol (MEGACE) 40 MG/ML suspension, Take 20 mL by mouth Daily., Disp: 480 mL, Rfl: 5  •  ondansetron  "(ZOFRAN) 8 MG tablet, Take 1 tablet by mouth Every 8 (Eight) Hours As Needed for Nausea., Disp: 60 tablet, Rfl: 5  •  ondansetron ODT (ZOFRAN-ODT) 4 MG disintegrating tablet, Take 1 tablet by mouth Every 6 (Six) Hours As Needed for Nausea or Vomiting., Disp: 15 tablet, Rfl: 0  •  raNITIdine (ZANTAC) 150 MG tablet, Take 150 mg by mouth As Needed for Heartburn., Disp: , Rfl:     PHYSICAL EXAMINATION:   BP (!) 187/88   Pulse 73   Temp 97.8 °F (36.6 °C) (Temporal)   Resp 16   Ht 160 cm (63\")   Wt 62.6 kg (138 lb)   BMI 24.45 kg/m²    ECOG Performance Status: 1 - Symptomatic but completely ambulatory  General Appearance:  alert, cooperative, no apparent distress and appears stated age   Neurologic/Psychiatric: A&O x 3, gait steady, appropriate affect, strength 5/5 in all muscle groups   HEENT:  Normocephalic, without obvious abnormality, mucous membranes moist   Neck: Supple, symmetrical, trachea midline, no adenopathy;  No thyromegaly, masses, or tenderness   Lungs:   Clear to auscultation bilaterally; respirations regular, even, and unlabored bilaterally   Heart:  Regular rate and rhythm, no murmurs appreciated   Abdomen:   Soft, non-tender, non-distended and no organomegaly   Lymph nodes: No cervical, supraclavicular, inguinal or axillary adenopathy noted   Extremities: Normal, atraumatic; no clubbing, cyanosis, or edema    Skin: No rashes, ulcers, or suspicious lesions noted     No visits with results within 2 Week(s) from this visit.   Latest known visit with results is:   Hospital Outpatient Visit on 04/22/2019   Component Date Value Ref Range Status   • Color, UA 04/22/2019 Yellow  Yellow, Straw Final   • Appearance, UA 04/22/2019 Clear  Clear Final   • pH, UA 04/22/2019 6.5  5.0 - 8.0 Final   • Specific Gravity, UA 04/22/2019 1.005  1.005 - 1.030 Final   • Glucose, UA 04/22/2019 Negative  Negative Final   • Ketones, UA 04/22/2019 Negative  Negative Final   • Bilirubin, UA 04/22/2019 Negative  Negative Final "   • Blood, UA 04/22/2019 Trace* Negative Final   • Protein, UA 04/22/2019 Negative  Negative Final   • Leuk Esterase, UA 04/22/2019 Negative  Negative Final   • Nitrite, UA 04/22/2019 Negative  Negative Final   • Urobilinogen, UA 04/22/2019 0.2 E.U./dL  0.2 - 1.0 E.U./dL Final   • CEA 04/22/2019 28.10  ng/mL Final   • Glucose 04/22/2019 117* 65 - 99 mg/dL Final   • BUN 04/22/2019 13  8 - 23 mg/dL Final   • Creatinine 04/22/2019 0.78  0.57 - 1.00 mg/dL Final   • Sodium 04/22/2019 135* 136 - 145 mmol/L Final   • Potassium 04/22/2019 4.1  3.5 - 5.2 mmol/L Final   • Chloride 04/22/2019 103  98 - 107 mmol/L Final   • CO2 04/22/2019 22.0  22.0 - 29.0 mmol/L Final   • Calcium 04/22/2019 8.3* 8.6 - 10.5 mg/dL Final   • Total Protein 04/22/2019 7.6  6.0 - 8.5 g/dL Final   • Albumin 04/22/2019 3.50  3.50 - 5.20 g/dL Final   • ALT (SGPT) 04/22/2019 11  1 - 33 U/L Final   • AST (SGOT) 04/22/2019 17  1 - 32 U/L Final   • Alkaline Phosphatase 04/22/2019 126* 39 - 117 U/L Final   • Total Bilirubin 04/22/2019 0.3  0.2 - 1.2 mg/dL Final   • eGFR Non African Amer 04/22/2019 71  >60 mL/min/1.73 Final   • Globulin 04/22/2019 4.1  gm/dL Final   • A/G Ratio 04/22/2019 0.9  g/dL Final   • BUN/Creatinine Ratio 04/22/2019 16.7  7.0 - 25.0 Final   • Anion Gap 04/22/2019 10.0  mmol/L Final   • WBC 04/22/2019 16.50* 3.40 - 10.80 10*3/mm3 Final   • RBC 04/22/2019 3.64* 3.77 - 5.28 10*6/mm3 Final   • Hemoglobin 04/22/2019 10.8* 12.0 - 15.9 g/dL Final   • Hematocrit 04/22/2019 32.3* 34.0 - 46.6 % Final   • RDW 04/22/2019 17.6* 12.3 - 15.4 % Final   • MCV 04/22/2019 88.9  79.0 - 97.0 fL Final   • MCH 04/22/2019 29.8  26.6 - 33.0 pg Final   • MCHC 04/22/2019 33.5  31.5 - 35.7 g/dL Final   • MPV 04/22/2019 7.8  6.0 - 12.0 fL Final   • Platelets 04/22/2019 207  140 - 450 10*3/mm3 Final   • Neutrophil % 04/22/2019 37.3* 42.7 - 76.0 % Final   • Lymphocyte % 04/22/2019 55.9* 19.6 - 45.3 % Final   • Monocyte % 04/22/2019 6.8  5.0 - 12.0 % Final   •  Neutrophils, Absolute 04/22/2019 6.20  1.70 - 7.00 10*3/mm3 Final   • Lymphocytes, Absolute 04/22/2019 9.20* 0.70 - 3.10 10*3/mm3 Final   • Monocytes, Absolute 04/22/2019 1.10* 0.10 - 0.90 10*3/mm3 Final        Xr Chest 1 View    Result Date: 4/19/2019  Narrative: EXAMINATION: XR CHEST 1 VW-  INDICATION: Pneumothorax.  COMPARISON: None.  FINDINGS: A Port-A-Cath is well positioned. There is no pneumothorax. Cardiac silhouette is normal and there are chronic pulmonary changes.         Impression: A Port-A-Cath has been inserted on the right and is well positioned. There is no pneumothorax.  D:  04/19/2019 E:  04/19/2019  This report was finalized on 4/19/2019 4:22 PM by Dr. Adonay Reese MD.      Fl C Arm During Surgery    Result Date: 4/19/2019  Narrative: EXAMINATION: FL C-ARM DURING SURGERY-04/19/2019:  INDICATION: Port, Port-A-Catheter placement.  COMPARISON: NONE.  FINDINGS: 38 seconds of fluoroscopy and 3 images used for Port-A-Catheter placement on the right. Please see the procedure report for full details.      Impression: Fluoroscopy used for Port-A-Catheter placement. Please see the procedure report for full details.  D:  04/19/2019 E:  04/19/2019     This report was finalized on 4/19/2019 4:59 PM by Dr. Eden Grant MD.        ASSESSMENT: The patient is a very pleasant 79 y.o. 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  Ascending colon adenocarcinoma T3 N1 M0 stage IIIa:  A.  Presenting with abdominal pain and irregular bowel movements  B.  Diagnosed after colonoscopy with a biopsy done on June 27, 2018  C.  Status post right hemicolectomy done by Dr. Wong August 01, 2018  D.  Final pathology revealed with 1 out of 25 positive mesenteric lymph nodes  E. Started adjuvant chemotherapy using Xeloda with oxaliplatin September 25, 2018, status post 5 cycles  F. CEA 17.8 March 25, 2019  G.  Progressive disease with diffuse liver metastases  dominant CAT scan done on March 2019  H. Started on palliative chemotherapy with FOLFIRI plus Avastin March 25, 2019, status post 3 cycles.  I.  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  2.  Hypertension  3.  Normocytic anemia  4.  Two hypodense right hepatic lobe liver lesions.  5.  Chemotherapy-induced nausea       PLAN:   1. I will proceed with palliative chemotherapy with FOLFIRI plus Avastin as scheduled today cycle #4.  2.   I will continuemonitor patient blood work including blood counts, kidney function, liver functions, and electrolytes.  We will also check CEA on day 1 of each cycle. I told her that it did start to decrease. We will continue to follow up it with each treatment.   3.  I will continue routine port care with each cycle of chemotherapy. She has EMLA cream to use prior to port access.   4.  The patient had microsatellite instability identified on gene analysis. We will consider immunotherapy in the future if needed if she fails second like treatment.   5. The patient and her family have been made aware of the possibility of Benton syndrome. She was not interested in referral to genetics for testing and counseling at this time.   6.  We discussed potential side effects of chemotherapy including diarrhea colitis low blood counts infusion reactions dehydration infection, and potential death.  7.  I will continue her pain medicine to Lortab 10/325 mg every 6 hours as needed for cancer related pain.  8.  I will continue the patient on Zofran as needed for nausea.  9.  She will follow-up with me in 2 weeks for cycle #5.  10.  I will repeat the patient's scans before cycle #6.  11.  We will monitor the patient blood pressure we may have to adjust her treatment if needed.    Schuyler Coleman MD  5/6/2019

## 2019-05-20 ENCOUNTER — HOSPITAL ENCOUNTER (OUTPATIENT)
Dept: ONCOLOGY | Facility: HOSPITAL | Age: 79
Setting detail: INFUSION SERIES
Discharge: HOME | End: 2019-05-20

## 2019-05-20 ENCOUNTER — OFFICE VISIT (OUTPATIENT)
Dept: ONCOLOGY | Facility: CLINIC | Age: 79
End: 2019-05-20

## 2019-05-20 VITALS
WEIGHT: 151 LBS | RESPIRATION RATE: 17 BRPM | SYSTOLIC BLOOD PRESSURE: 201 MMHG | TEMPERATURE: 98.1 F | DIASTOLIC BLOOD PRESSURE: 112 MMHG | BODY MASS INDEX: 26.75 KG/M2 | HEIGHT: 63 IN | HEART RATE: 90 BPM

## 2019-05-20 VITALS — DIASTOLIC BLOOD PRESSURE: 76 MMHG | SYSTOLIC BLOOD PRESSURE: 167 MMHG | HEART RATE: 68 BPM

## 2019-05-20 DIAGNOSIS — C18.9 MALIGNANT NEOPLASM OF COLON, UNSPECIFIED PART OF COLON (HCC): Primary | ICD-10-CM

## 2019-05-20 LAB
ALBUMIN SERPL-MCNC: 3.6 G/DL (ref 3.5–5.2)
ALBUMIN/GLOB SERPL: 1.1 G/DL
ALP SERPL-CCNC: 100 U/L (ref 39–117)
ALT SERPL W P-5'-P-CCNC: 16 U/L (ref 1–33)
ANION GAP SERPL CALCULATED.3IONS-SCNC: 10 MMOL/L
AST SERPL-CCNC: 18 U/L (ref 1–32)
BILIRUB SERPL-MCNC: 0.4 MG/DL (ref 0.2–1.2)
BILIRUB UR QL STRIP: NEGATIVE
BUN BLD-MCNC: 15 MG/DL (ref 8–23)
BUN/CREAT SERPL: 16.5 (ref 7–25)
CALCIUM SPEC-SCNC: 8.1 MG/DL (ref 8.6–10.5)
CEA SERPL-MCNC: 17.8 NG/ML
CHLORIDE SERPL-SCNC: 104 MMOL/L (ref 98–107)
CLARITY UR: CLEAR
CO2 SERPL-SCNC: 22 MMOL/L (ref 22–29)
COLOR UR: YELLOW
CREAT BLD-MCNC: 0.91 MG/DL (ref 0.57–1)
ERYTHROCYTE [DISTWIDTH] IN BLOOD BY AUTOMATED COUNT: 21.8 % (ref 12.3–15.4)
GFR SERPL CREATININE-BSD FRML MDRD: 59 ML/MIN/1.73
GLOBULIN UR ELPH-MCNC: 3.4 GM/DL
GLUCOSE BLD-MCNC: 98 MG/DL (ref 65–99)
GLUCOSE UR STRIP-MCNC: NEGATIVE MG/DL
HCT VFR BLD AUTO: 32.3 % (ref 34–46.6)
HGB BLD-MCNC: 10.9 G/DL (ref 12–15.9)
HGB UR QL STRIP.AUTO: ABNORMAL
KETONES UR QL STRIP: NEGATIVE
LEUKOCYTE ESTERASE UR QL STRIP.AUTO: ABNORMAL
LYMPHOCYTES # BLD AUTO: 7.7 10*3/MM3 (ref 0.7–3.1)
LYMPHOCYTES NFR BLD AUTO: 60.3 % (ref 19.6–45.3)
MCH RBC QN AUTO: 30.3 PG (ref 26.6–33)
MCHC RBC AUTO-ENTMCNC: 33.6 G/DL (ref 31.5–35.7)
MCV RBC AUTO: 90.2 FL (ref 79–97)
MONOCYTES # BLD AUTO: 0.6 10*3/MM3 (ref 0.1–0.9)
MONOCYTES NFR BLD AUTO: 4.8 % (ref 5–12)
NEUTROPHILS # BLD AUTO: 4.4 10*3/MM3 (ref 1.7–7)
NEUTROPHILS NFR BLD AUTO: 34.9 % (ref 42.7–76)
NITRITE UR QL STRIP: NEGATIVE
PH UR STRIP.AUTO: 5 [PH] (ref 5–8)
PLATELET # BLD AUTO: 192 10*3/MM3 (ref 140–450)
PMV BLD AUTO: 6.7 FL (ref 6–12)
POTASSIUM BLD-SCNC: 4 MMOL/L (ref 3.5–5.2)
PROT SERPL-MCNC: 7 G/DL (ref 6–8.5)
PROT UR QL STRIP: NEGATIVE
RBC # BLD AUTO: 3.58 10*6/MM3 (ref 3.77–5.28)
SODIUM BLD-SCNC: 136 MMOL/L (ref 136–145)
SP GR UR STRIP: 1.01 (ref 1–1.03)
UROBILINOGEN UR QL STRIP: ABNORMAL
WBC NRBC COR # BLD: 12.7 10*3/MM3 (ref 3.4–10.8)

## 2019-05-20 PROCEDURE — 85025 COMPLETE CBC W/AUTO DIFF WBC: CPT | Performed by: INTERNAL MEDICINE

## 2019-05-20 PROCEDURE — 96368 THER/DIAG CONCURRENT INF: CPT

## 2019-05-20 PROCEDURE — 82378 CARCINOEMBRYONIC ANTIGEN: CPT | Performed by: INTERNAL MEDICINE

## 2019-05-20 PROCEDURE — 80053 COMPREHEN METABOLIC PANEL: CPT | Performed by: INTERNAL MEDICINE

## 2019-05-20 PROCEDURE — 96417 CHEMO IV INFUS EACH ADDL SEQ: CPT

## 2019-05-20 PROCEDURE — 25010000002 ATROPINE PER 0.01 MG: Performed by: INTERNAL MEDICINE

## 2019-05-20 PROCEDURE — 25010000002 FLUOROURACIL PER 500 MG: Performed by: INTERNAL MEDICINE

## 2019-05-20 PROCEDURE — 96415 CHEMO IV INFUSION ADDL HR: CPT

## 2019-05-20 PROCEDURE — 25010000002 BEVACIZUMAB 100 MG/4ML SOLUTION 4 ML VIAL: Performed by: INTERNAL MEDICINE

## 2019-05-20 PROCEDURE — 81003 URINALYSIS AUTO W/O SCOPE: CPT | Performed by: INTERNAL MEDICINE

## 2019-05-20 PROCEDURE — 96411 CHEMO IV PUSH ADDL DRUG: CPT

## 2019-05-20 PROCEDURE — 96416 CHEMO PROLONG INFUSE W/PUMP: CPT

## 2019-05-20 PROCEDURE — 25010000002 LEUCOVORIN CALCIUM PER 50 MG: Performed by: INTERNAL MEDICINE

## 2019-05-20 PROCEDURE — 25010000002 IRINOTECAN PER 20 MG: Performed by: INTERNAL MEDICINE

## 2019-05-20 PROCEDURE — 96413 CHEMO IV INFUSION 1 HR: CPT

## 2019-05-20 PROCEDURE — 25010000002 PALONOSETRON PER 25 MCG: Performed by: INTERNAL MEDICINE

## 2019-05-20 PROCEDURE — 99215 OFFICE O/P EST HI 40 MIN: CPT | Performed by: INTERNAL MEDICINE

## 2019-05-20 PROCEDURE — 96375 TX/PRO/DX INJ NEW DRUG ADDON: CPT

## 2019-05-20 PROCEDURE — 25010000002 DEXAMETHASONE PER 1 MG: Performed by: INTERNAL MEDICINE

## 2019-05-20 RX ORDER — LIDOCAINE AND PRILOCAINE 25; 25 MG/G; MG/G
CREAM TOPICAL AS NEEDED
Qty: 30 G | Refills: 3 | Status: SHIPPED | OUTPATIENT
Start: 2019-05-20 | End: 2019-10-22

## 2019-05-20 RX ORDER — PALONOSETRON 0.05 MG/ML
0.25 INJECTION, SOLUTION INTRAVENOUS ONCE
Status: CANCELLED | OUTPATIENT
Start: 2019-05-20

## 2019-05-20 RX ORDER — FLUOROURACIL 50 MG/ML
400 INJECTION, SOLUTION INTRAVENOUS ONCE
Status: COMPLETED | OUTPATIENT
Start: 2019-05-20 | End: 2019-05-20

## 2019-05-20 RX ORDER — SODIUM CHLORIDE 9 MG/ML
250 INJECTION, SOLUTION INTRAVENOUS ONCE
Status: CANCELLED | OUTPATIENT
Start: 2019-06-03

## 2019-05-20 RX ORDER — FLUOROURACIL 50 MG/ML
400 INJECTION, SOLUTION INTRAVENOUS ONCE
Status: CANCELLED | OUTPATIENT
Start: 2019-06-03

## 2019-05-20 RX ORDER — ATROPINE SULFATE 1 MG/ML
0.25 INJECTION, SOLUTION INTRAMUSCULAR; INTRAVENOUS; SUBCUTANEOUS
Status: DISCONTINUED | OUTPATIENT
Start: 2019-05-20 | End: 2019-05-21 | Stop reason: HOSPADM

## 2019-05-20 RX ORDER — PALONOSETRON 0.05 MG/ML
0.25 INJECTION, SOLUTION INTRAVENOUS ONCE
Status: COMPLETED | OUTPATIENT
Start: 2019-05-20 | End: 2019-05-20

## 2019-05-20 RX ORDER — DIPHENHYDRAMINE, LIDOCAINE, NYSTATIN
KIT ORAL
Qty: 240 ML | Refills: 3 | Status: SHIPPED | OUTPATIENT
Start: 2019-05-20 | End: 2019-10-22

## 2019-05-20 RX ORDER — FLUOROURACIL 50 MG/ML
400 INJECTION, SOLUTION INTRAVENOUS ONCE
Status: CANCELLED | OUTPATIENT
Start: 2019-05-20

## 2019-05-20 RX ORDER — SODIUM CHLORIDE 9 MG/ML
250 INJECTION, SOLUTION INTRAVENOUS ONCE
Status: CANCELLED | OUTPATIENT
Start: 2019-05-20

## 2019-05-20 RX ORDER — PALONOSETRON 0.05 MG/ML
0.25 INJECTION, SOLUTION INTRAVENOUS ONCE
Status: CANCELLED | OUTPATIENT
Start: 2019-06-03

## 2019-05-20 RX ORDER — ATROPINE SULFATE 1 MG/ML
0.25 INJECTION, SOLUTION INTRAMUSCULAR; INTRAVENOUS; SUBCUTANEOUS
Status: CANCELLED | OUTPATIENT
Start: 2019-05-20

## 2019-05-20 RX ORDER — ATROPINE SULFATE 1 MG/ML
0.25 INJECTION, SOLUTION INTRAMUSCULAR; INTRAVENOUS; SUBCUTANEOUS
Status: CANCELLED | OUTPATIENT
Start: 2019-06-03

## 2019-05-20 RX ORDER — SODIUM CHLORIDE 9 MG/ML
250 INJECTION, SOLUTION INTRAVENOUS ONCE
Status: COMPLETED | OUTPATIENT
Start: 2019-05-20 | End: 2019-05-20

## 2019-05-20 RX ADMIN — ATROPINE SULFATE 0.25 MG: 1 INJECTION, SOLUTION INTRAMUSCULAR; INTRAVENOUS; SUBCUTANEOUS at 12:09

## 2019-05-20 RX ADMIN — FLUOROURACIL 660 MG: 50 INJECTION, SOLUTION INTRAVENOUS at 13:47

## 2019-05-20 RX ADMIN — DEXAMETHASONE SODIUM PHOSPHATE 12 MG: 4 INJECTION, SOLUTION INTRAMUSCULAR; INTRAVENOUS at 11:45

## 2019-05-20 RX ADMIN — PALONOSETRON HYDROCHLORIDE 0.25 MG: 0.25 INJECTION, SOLUTION INTRAVENOUS at 11:44

## 2019-05-20 RX ADMIN — IRINOTECAN HYDROCHLORIDE 300 MG: 20 INJECTION, SOLUTION INTRAVENOUS at 12:13

## 2019-05-20 RX ADMIN — FLUOROURACIL 4000 MG: 50 INJECTION, SOLUTION INTRAVENOUS at 13:52

## 2019-05-20 RX ADMIN — BEVACIZUMAB 300 MG: 100 INJECTION, SOLUTION INTRAVENOUS at 11:02

## 2019-05-20 RX ADMIN — LEUCOVORIN CALCIUM 660 MG: 350 INJECTION, POWDER, LYOPHILIZED, FOR SOLUTION INTRAMUSCULAR; INTRAVENOUS at 12:14

## 2019-05-20 RX ADMIN — SODIUM CHLORIDE 250 ML: 9 INJECTION, SOLUTION INTRAVENOUS at 11:00

## 2019-05-20 NOTE — PROGRESS NOTES
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     HISTORY OF PRESENT ILLNESS: The patient is a very pleasant 79 y.o. female  with past medical history significant for colon cancer diagnosed 07/04/18. The patient presented to [facility] for gastritis and right lower quadrant pain.  This has been going on for the last few months associated with nausea but no vomiting.  She's been having one of dark stools.  Never had this problem before.  She had previous negative colonoscopy.  Colon polyp was found and biopsied. Surgical pathology 07/04/18 revealed invasive poorly differentiated adenocarcinoma in ascending colon. CT abdomen and pelvis 07/06/18 showed mass within the ascending colon with large mercy mass in the adjacent mesentery and prominent nodes around the cecum. The patient presented to Dr Juarez for elective right hemicolectomy on 08/01/18. Pathology 08/01/18 from colon and terminal ileum resection revealed poorly differentiated colonic adenocarcinoma ([id number]) with metastatic adenocarcinoma to 1 of 25 lymph nodes.  She was started on adjuvant chemotherapy using oxaliplatin with Xeloda September 25, 2018.  The patient completed 5 cycles and then decide to stop secondary to multiple side effects.  Repeated scans done on March 16, 2019 revealed new liver metastases.  She was started on palliative chemotherapy with FOLFIRI plus Avastin March 25, 2019.  The patient is here today for follow up visit was cycle #5.        SUBJECTIVE: The patient is here today with her niece. She is complaining of pain with swallowing.  She denies any night sweats.  She completed a Z-Asim for upper airway infection.    PAST MEDICAL HISTORY/SOCIAL HISTORY/FAMILY HISTORY: Reviewed by me and unchanged from my documentation done on 09/17/18.    Review of Systems   Constitutional: Positive for fatigue. Negative for activity change,  appetite change, chills, fever and unexpected weight change.   HENT: Negative for hearing loss, mouth sores, nosebleeds, sore throat and trouble swallowing.    Eyes: Negative for visual disturbance.   Respiratory: Negative for cough, chest tightness, shortness of breath and wheezing.    Cardiovascular: Negative for chest pain, palpitations and leg swelling.   Gastrointestinal: Positive for diarrhea and nausea. Negative for abdominal distention, abdominal pain, blood in stool, constipation, rectal pain and vomiting.   Endocrine: Negative for cold intolerance and heat intolerance.   Genitourinary: Negative for difficulty urinating, dysuria, frequency and urgency.   Musculoskeletal: Negative for arthralgias, back pain, gait problem, joint swelling and myalgias.   Skin: Positive for rash.   Neurological: Negative for dizziness, tremors, syncope, weakness, light-headedness, numbness and headaches.   Hematological: Negative for adenopathy. Does not bruise/bleed easily.   Psychiatric/Behavioral: Negative for confusion, sleep disturbance and suicidal ideas. The patient is not nervous/anxious.          Current Outpatient Medications:   •  atenolol (TENORMIN) 50 MG tablet, Take 50 mg by mouth Daily., Disp: , Rfl:   •  HYDROcodone-acetaminophen (NORCO)  MG per tablet, Take 1 tablet by mouth Every 6 (Six) Hours As Needed for Moderate Pain ., Disp: 120 tablet, Rfl: 0  •  HYDROcodone-acetaminophen (NORCO) 5-325 MG per tablet, Take 1-2 tablets by mouth Every 6 (Six) Hours As Needed for pain, Disp: 10 tablet, Rfl: 0  •  lidocaine-prilocaine (EMLA) 2.5-2.5 % cream, Apply  topically to the appropriate area as directed As Needed (45-60 minutes prior to port access.  Cover with saran/plastic wrap.)., Disp: 30 g, Rfl: 3  •  lidocaine-prilocaine (EMLA) 2.5-2.5 % cream, Apply  topically to the appropriate area as directed As Needed (45-60 minutes prior to port access.  Cover with saran/plastic wrap.)., Disp: 30 g, Rfl: 3  •   "megestrol (MEGACE) 40 MG/ML suspension, Take 20 mL by mouth Daily., Disp: 480 mL, Rfl: 5  •  ondansetron (ZOFRAN) 8 MG tablet, Take 1 tablet by mouth Every 8 (Eight) Hours As Needed for Nausea., Disp: 60 tablet, Rfl: 5  •  ondansetron ODT (ZOFRAN-ODT) 4 MG disintegrating tablet, Take 1 tablet by mouth Every 6 (Six) Hours As Needed for Nausea or Vomiting., Disp: 15 tablet, Rfl: 0  •  raNITIdine (ZANTAC) 150 MG tablet, Take 150 mg by mouth As Needed for Heartburn., Disp: , Rfl:     PHYSICAL EXAMINATION:   BP (!) 201/112   Pulse 90   Temp 98.1 °F (36.7 °C) (Temporal)   Resp 17   Ht 160 cm (63\")   Wt 68.5 kg (151 lb)   BMI 26.75 kg/m²    ECOG Performance Status: 1 - Symptomatic but completely ambulatory  General Appearance:  alert, cooperative, no apparent distress and appears stated age   Neurologic/Psychiatric: A&O x 3, gait steady, appropriate affect, strength 5/5 in all muscle groups   HEENT:  Normocephalic, without obvious abnormality, mucous membranes moist   Neck: Supple, symmetrical, trachea midline, no adenopathy;  No thyromegaly, masses, or tenderness   Lungs:   Clear to auscultation bilaterally; respirations regular, even, and unlabored bilaterally   Heart:  Regular rate and rhythm, no murmurs appreciated   Abdomen:   Soft, non-tender, non-distended and no organomegaly   Lymph nodes: No cervical, supraclavicular, inguinal or axillary adenopathy noted   Extremities: Normal, atraumatic; no clubbing, cyanosis, or edema    Skin: No rashes, ulcers, or suspicious lesions noted     No visits with results within 2 Week(s) from this visit.   Latest known visit with results is:   Hospital Outpatient Visit on 05/06/2019   Component Date Value Ref Range Status   • CEA 05/06/2019 25.40  ng/mL Final   • Glucose 05/06/2019 83  65 - 99 mg/dL Final   • BUN 05/06/2019 19  8 - 23 mg/dL Final   • Creatinine 05/06/2019 0.96  0.57 - 1.00 mg/dL Final   • Sodium 05/06/2019 132* 136 - 145 mmol/L Final   • Potassium 05/06/2019 " 4.6  3.5 - 5.2 mmol/L Final   • Chloride 05/06/2019 100  98 - 107 mmol/L Final   • CO2 05/06/2019 19.0* 22.0 - 29.0 mmol/L Final   • Calcium 05/06/2019 8.9  8.6 - 10.5 mg/dL Final   • Total Protein 05/06/2019 7.8  6.0 - 8.5 g/dL Final   • Albumin 05/06/2019 3.70  3.50 - 5.20 g/dL Final   • ALT (SGPT) 05/06/2019 9  1 - 33 U/L Final   • AST (SGOT) 05/06/2019 15  1 - 32 U/L Final   • Alkaline Phosphatase 05/06/2019 110  39 - 117 U/L Final   • Total Bilirubin 05/06/2019 0.4  0.2 - 1.2 mg/dL Final   • eGFR Non African Amer 05/06/2019 56* >60 mL/min/1.73 Final   • Globulin 05/06/2019 4.1  gm/dL Final   • A/G Ratio 05/06/2019 0.9  g/dL Final   • BUN/Creatinine Ratio 05/06/2019 19.8  7.0 - 25.0 Final   • Anion Gap 05/06/2019 13.0  mmol/L Final   • Color, UA 05/06/2019 Yellow  Yellow, Straw Final   • Appearance, UA 05/06/2019 Clear  Clear Final   • pH, UA 05/06/2019 5.0  5.0 - 8.0 Final   • Specific Gravity, UA 05/06/2019 1.010  1.005 - 1.030 Final   • Glucose, UA 05/06/2019 Negative  Negative Final   • Ketones, UA 05/06/2019 Negative  Negative Final   • Bilirubin, UA 05/06/2019 Negative  Negative Final   • Blood, UA 05/06/2019 Small (1+)* Negative Final   • Protein, UA 05/06/2019 Negative  Negative Final   • Leuk Esterase, UA 05/06/2019 Negative  Negative Final   • Nitrite, UA 05/06/2019 Negative  Negative Final   • Urobilinogen, UA 05/06/2019 0.2 E.U./dL  0.2 - 1.0 E.U./dL Final   • WBC 05/06/2019 18.50* 3.40 - 10.80 10*3/mm3 Final   • RBC 05/06/2019 3.87  3.77 - 5.28 10*6/mm3 Final   • Hemoglobin 05/06/2019 11.5* 12.0 - 15.9 g/dL Final   • Hematocrit 05/06/2019 34.6  34.0 - 46.6 % Final   • RDW 05/06/2019 19.5* 12.3 - 15.4 % Final   • MCV 05/06/2019 89.2  79.0 - 97.0 fL Final   • MCH 05/06/2019 29.8  26.6 - 33.0 pg Final   • MCHC 05/06/2019 33.4  31.5 - 35.7 g/dL Final   • MPV 05/06/2019 7.0  6.0 - 12.0 fL Final   • Platelets 05/06/2019 257  140 - 450 10*3/mm3 Final   • Neutrophil % 05/06/2019 26.9* 42.7 - 76.0 % Final    • Lymphocyte % 05/06/2019 67.5* 19.6 - 45.3 % Final   • Monocyte % 05/06/2019 5.6  5.0 - 12.0 % Final   • Neutrophils, Absolute 05/06/2019 5.00  1.70 - 7.00 10*3/mm3 Final   • Lymphocytes, Absolute 05/06/2019 12.50* 0.70 - 3.10 10*3/mm3 Final   • Monocytes, Absolute 05/06/2019 1.00* 0.10 - 0.90 10*3/mm3 Final        No results found.    ASSESSMENT: The patient is a very pleasant 79 y.o. 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  Ascending colon adenocarcinoma T3 N1 M0 stage IIIa:  A.  Presenting with abdominal pain and irregular bowel movements  B.  Diagnosed after colonoscopy with a biopsy done on June 27, 2018  C.  Status post right hemicolectomy done by Dr. Wong August 01, 2018  D.  Final pathology revealed with 1 out of 25 positive mesenteric lymph nodes  E. Started adjuvant chemotherapy using Xeloda with oxaliplatin September 25, 2018, status post 5 cycles  F. CEA 17.8 March 25, 2019  G.  Progressive disease with diffuse liver metastases dominant CAT scan done on March 2019  H. Started on palliative chemotherapy with FOLFIRI plus Avastin March 25, 2019, status post 4 cycles.  I.  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  2.  Hypertension  3.  Normocytic anemia  4.  Two hypodense right hepatic lobe liver lesions.  5.  Chemotherapy-induced nausea       PLAN:   1. I will proceed with palliative chemotherapy with FOLFIRI plus Avastin as scheduled today cycle #5.  2.   I will continuemonitor patient blood work including blood counts, kidney function, liver functions, and electrolytes.  We will also check CEA on day 1 of each cycle. I told her that it did start to decrease. We will continue to follow up it with each treatment.   3.  I will continue routine port care with  each cycle of chemotherapy. She has EMLA cream to use prior to port access.   4.  The patient had microsatellite instability identified on gene analysis. We will consider immunotherapy in the future if needed if she fails second like treatment.   5. The patient and her family have been made aware of the possibility of Benton syndrome. She was not interested in referral to genetics for testing and counseling at this time.   6.  We discussed potential side effects of chemotherapy including diarrhea colitis low blood counts infusion reactions dehydration infection, and potential death.  7.  I will continue her pain medicine to Lortab 10/325 mg every 6 hours as needed for cancer related pain.  8.  I will continue the patient on Zofran as needed for nausea.  9.  She will follow-up with me in 2 weeks for cycle #6.  10.  I will repeat the patient's scans before cycle #6. Those will be ordered prior to return.  11.  We will monitor the patient blood pressure we may have to adjust her treatment if needed.    Schuyler Coleman MD  5/20/2019

## 2019-05-29 ENCOUNTER — LAB (OUTPATIENT)
Dept: LAB | Facility: HOSPITAL | Age: 79
End: 2019-05-29

## 2019-05-29 ENCOUNTER — HOSPITAL ENCOUNTER (OUTPATIENT)
Dept: CT IMAGING | Facility: HOSPITAL | Age: 79
Discharge: HOME | End: 2019-05-29
Admitting: INTERNAL MEDICINE

## 2019-05-29 DIAGNOSIS — C18.9 MALIGNANT NEOPLASM OF COLON, UNSPECIFIED PART OF COLON (HCC): ICD-10-CM

## 2019-05-29 LAB
ALBUMIN SERPL-MCNC: 4.1 G/DL (ref 3.5–5.2)
ALBUMIN/GLOB SERPL: 1.2 G/DL
ALP SERPL-CCNC: 93 U/L (ref 39–117)
ALT SERPL W P-5'-P-CCNC: 15 U/L (ref 1–33)
ANION GAP SERPL CALCULATED.3IONS-SCNC: 10 MMOL/L
AST SERPL-CCNC: 18 U/L (ref 1–32)
BASOPHILS # BLD MANUAL: 0 10*3/MM3 (ref 0–0.2)
BASOPHILS NFR BLD AUTO: 0 % (ref 0–1.5)
BILIRUB SERPL-MCNC: 0.4 MG/DL (ref 0.2–1.2)
BILIRUB UR QL STRIP: NEGATIVE
BUN BLD-MCNC: 11 MG/DL (ref 8–23)
BUN/CREAT SERPL: 12.5 (ref 7–25)
CALCIUM SPEC-SCNC: 9.2 MG/DL (ref 8.6–10.5)
CEA SERPL-MCNC: 16.1 NG/ML
CHLORIDE SERPL-SCNC: 103 MMOL/L (ref 98–107)
CLARITY UR: CLEAR
CO2 SERPL-SCNC: 24 MMOL/L (ref 22–29)
COLOR UR: YELLOW
CREAT BLD-MCNC: 0.88 MG/DL (ref 0.57–1)
DEPRECATED RDW RBC AUTO: 65 FL (ref 37–54)
EOSINOPHIL # BLD MANUAL: 0.48 10*3/MM3 (ref 0–0.4)
EOSINOPHIL NFR BLD MANUAL: 3 % (ref 0.3–6.2)
ERYTHROCYTE [DISTWIDTH] IN BLOOD BY AUTOMATED COUNT: 19.6 % (ref 12.3–15.4)
GFR SERPL CREATININE-BSD FRML MDRD: 62 ML/MIN/1.73
GLOBULIN UR ELPH-MCNC: 3.5 GM/DL
GLUCOSE BLD-MCNC: 85 MG/DL (ref 65–99)
GLUCOSE UR STRIP-MCNC: NEGATIVE MG/DL
HCT VFR BLD AUTO: 36 % (ref 34–46.6)
HGB BLD-MCNC: 11.9 G/DL (ref 12–15.9)
HGB UR QL STRIP.AUTO: NEGATIVE
KETONES UR QL STRIP: NEGATIVE
LEUKOCYTE ESTERASE UR QL STRIP.AUTO: NEGATIVE
LYMPHOCYTES # BLD MANUAL: 11.64 10*3/MM3 (ref 0.7–3.1)
LYMPHOCYTES NFR BLD MANUAL: 2 % (ref 5–12)
LYMPHOCYTES NFR BLD MANUAL: 73 % (ref 19.6–45.3)
MCH RBC QN AUTO: 30.2 PG (ref 26.6–33)
MCHC RBC AUTO-ENTMCNC: 33.1 G/DL (ref 31.5–35.7)
MCV RBC AUTO: 91.4 FL (ref 79–97)
METAMYELOCYTES NFR BLD MANUAL: 1 % (ref 0–0)
MONOCYTES # BLD AUTO: 0.32 10*3/MM3 (ref 0.1–0.9)
NEUTROPHILS # BLD AUTO: 2.71 10*3/MM3 (ref 1.7–7)
NEUTROPHILS NFR BLD MANUAL: 15 % (ref 42.7–76)
NEUTS BAND NFR BLD MANUAL: 2 % (ref 0–5)
NITRITE UR QL STRIP: NEGATIVE
PH UR STRIP.AUTO: 6.5 [PH] (ref 5–8)
PLAT MORPH BLD: NORMAL
PLATELET # BLD AUTO: 189 10*3/MM3 (ref 140–450)
PMV BLD AUTO: 9.5 FL (ref 6–12)
POTASSIUM BLD-SCNC: 4.2 MMOL/L (ref 3.5–5.2)
PROT SERPL-MCNC: 7.6 G/DL (ref 6–8.5)
PROT UR QL STRIP: NEGATIVE
RBC # BLD AUTO: 3.94 10*6/MM3 (ref 3.77–5.28)
RBC MORPH BLD: NORMAL
SODIUM BLD-SCNC: 137 MMOL/L (ref 136–145)
SP GR UR STRIP: >=1.03 (ref 1–1.03)
UROBILINOGEN UR QL STRIP: NORMAL
VARIANT LYMPHS NFR BLD MANUAL: 4 % (ref 0–5)
WBC MORPH BLD: NORMAL
WBC NRBC COR # BLD: 15.95 10*3/MM3 (ref 3.4–10.8)

## 2019-05-29 PROCEDURE — 82378 CARCINOEMBRYONIC ANTIGEN: CPT

## 2019-05-29 PROCEDURE — 25010000002 IOPAMIDOL 61 % SOLUTION: Performed by: INTERNAL MEDICINE

## 2019-05-29 PROCEDURE — 71260 CT THORAX DX C+: CPT

## 2019-05-29 PROCEDURE — 81003 URINALYSIS AUTO W/O SCOPE: CPT

## 2019-05-29 PROCEDURE — 36415 COLL VENOUS BLD VENIPUNCTURE: CPT

## 2019-05-29 PROCEDURE — 85007 BL SMEAR W/DIFF WBC COUNT: CPT

## 2019-05-29 PROCEDURE — 85025 COMPLETE CBC W/AUTO DIFF WBC: CPT

## 2019-05-29 PROCEDURE — 80053 COMPREHEN METABOLIC PANEL: CPT

## 2019-05-29 PROCEDURE — 74177 CT ABD & PELVIS W/CONTRAST: CPT

## 2019-05-29 RX ADMIN — IOPAMIDOL 99 ML: 612 INJECTION, SOLUTION INTRAVENOUS at 08:54

## 2019-06-03 ENCOUNTER — OFFICE VISIT (OUTPATIENT)
Dept: ONCOLOGY | Facility: CLINIC | Age: 79
End: 2019-06-03

## 2019-06-03 ENCOUNTER — HOSPITAL ENCOUNTER (OUTPATIENT)
Dept: ONCOLOGY | Facility: HOSPITAL | Age: 79
Setting detail: INFUSION SERIES
Discharge: HOME | End: 2019-06-03

## 2019-06-03 VITALS
TEMPERATURE: 96.8 F | WEIGHT: 146.9 LBS | RESPIRATION RATE: 18 BRPM | HEART RATE: 69 BPM | BODY MASS INDEX: 26.03 KG/M2 | DIASTOLIC BLOOD PRESSURE: 87 MMHG | HEIGHT: 63 IN | OXYGEN SATURATION: 99 % | SYSTOLIC BLOOD PRESSURE: 184 MMHG

## 2019-06-03 VITALS — HEART RATE: 64 BPM | DIASTOLIC BLOOD PRESSURE: 80 MMHG | SYSTOLIC BLOOD PRESSURE: 161 MMHG

## 2019-06-03 DIAGNOSIS — C18.9 MALIGNANT NEOPLASM OF COLON, UNSPECIFIED PART OF COLON (HCC): Primary | ICD-10-CM

## 2019-06-03 LAB
ALBUMIN SERPL-MCNC: 4 G/DL (ref 3.5–5.2)
ALBUMIN/GLOB SERPL: 1.1 G/DL
ALP SERPL-CCNC: 103 U/L (ref 39–117)
ALT SERPL W P-5'-P-CCNC: 12 U/L (ref 1–33)
ANION GAP SERPL CALCULATED.3IONS-SCNC: 10 MMOL/L
AST SERPL-CCNC: 16 U/L (ref 1–32)
BACTERIA UR QL AUTO: ABNORMAL /HPF
BILIRUB SERPL-MCNC: 0.3 MG/DL (ref 0.2–1.2)
BILIRUB UR QL STRIP: NEGATIVE
BILIRUB UR QL STRIP: NEGATIVE
BUN BLD-MCNC: 13 MG/DL (ref 8–23)
BUN/CREAT SERPL: 16 (ref 7–25)
CALCIUM SPEC-SCNC: 9.3 MG/DL (ref 8.6–10.5)
CHLORIDE SERPL-SCNC: 101 MMOL/L (ref 98–107)
CLARITY UR: ABNORMAL
CLARITY UR: ABNORMAL
CO2 SERPL-SCNC: 23 MMOL/L (ref 22–29)
COLOR UR: YELLOW
COLOR UR: YELLOW
CREAT BLD-MCNC: 0.81 MG/DL (ref 0.57–1)
ERYTHROCYTE [DISTWIDTH] IN BLOOD BY AUTOMATED COUNT: 21.4 % (ref 12.3–15.4)
GFR SERPL CREATININE-BSD FRML MDRD: 68 ML/MIN/1.73
GLOBULIN UR ELPH-MCNC: 3.7 GM/DL
GLUCOSE BLD-MCNC: 68 MG/DL (ref 65–99)
GLUCOSE UR STRIP-MCNC: NEGATIVE MG/DL
GLUCOSE UR STRIP-MCNC: NEGATIVE MG/DL
HCT VFR BLD AUTO: 33.3 % (ref 34–46.6)
HGB BLD-MCNC: 11.1 G/DL (ref 12–15.9)
HGB UR QL STRIP.AUTO: ABNORMAL
HGB UR QL STRIP.AUTO: ABNORMAL
HYALINE CASTS UR QL AUTO: ABNORMAL /LPF
KETONES UR QL STRIP: NEGATIVE
KETONES UR QL STRIP: NEGATIVE
LEUKOCYTE ESTERASE UR QL STRIP.AUTO: ABNORMAL
LEUKOCYTE ESTERASE UR QL STRIP.AUTO: ABNORMAL
LYMPHOCYTES # BLD AUTO: 8.1 10*3/MM3 (ref 0.7–3.1)
LYMPHOCYTES NFR BLD AUTO: 60.2 % (ref 19.6–45.3)
MCH RBC QN AUTO: 30.4 PG (ref 26.6–33)
MCHC RBC AUTO-ENTMCNC: 33.3 G/DL (ref 31.5–35.7)
MCV RBC AUTO: 91.3 FL (ref 79–97)
MONOCYTES # BLD AUTO: 1 10*3/MM3 (ref 0.1–0.9)
MONOCYTES NFR BLD AUTO: 7.5 % (ref 5–12)
NEUTROPHILS # BLD AUTO: 4.4 10*3/MM3 (ref 1.7–7)
NEUTROPHILS NFR BLD AUTO: 32.3 % (ref 42.7–76)
NITRITE UR QL STRIP: POSITIVE
NITRITE UR QL STRIP: POSITIVE
PH UR STRIP.AUTO: 6 [PH] (ref 5–8)
PH UR STRIP.AUTO: 6 [PH] (ref 5–8)
PLATELET # BLD AUTO: 248 10*3/MM3 (ref 140–450)
PMV BLD AUTO: 7.1 FL (ref 6–12)
POTASSIUM BLD-SCNC: 4.2 MMOL/L (ref 3.5–5.2)
PROT SERPL-MCNC: 7.7 G/DL (ref 6–8.5)
PROT UR QL STRIP: ABNORMAL
PROT UR QL STRIP: NEGATIVE
RBC # BLD AUTO: 3.65 10*6/MM3 (ref 3.77–5.28)
RBC # UR: ABNORMAL /HPF
REF LAB TEST METHOD: ABNORMAL
SODIUM BLD-SCNC: 134 MMOL/L (ref 136–145)
SP GR UR STRIP: 1.01 (ref 1–1.03)
SP GR UR STRIP: 1.02 (ref 1–1.03)
SQUAMOUS #/AREA URNS HPF: ABNORMAL /HPF
UROBILINOGEN UR QL STRIP: ABNORMAL
UROBILINOGEN UR QL STRIP: ABNORMAL
WBC NRBC COR # BLD: 13.5 10*3/MM3 (ref 3.4–10.8)
WBC UR QL AUTO: ABNORMAL /HPF

## 2019-06-03 PROCEDURE — 25010000002 LEUCOVORIN 200 MG RECONSTITUTED SOLUTION 1 EACH VIAL: Performed by: INTERNAL MEDICINE

## 2019-06-03 PROCEDURE — 25010000002 IRINOTECAN PER 20 MG: Performed by: INTERNAL MEDICINE

## 2019-06-03 PROCEDURE — 96415 CHEMO IV INFUSION ADDL HR: CPT

## 2019-06-03 PROCEDURE — 25010000002 BEVACIZUMAB 100 MG/4ML SOLUTION 4 ML VIAL: Performed by: INTERNAL MEDICINE

## 2019-06-03 PROCEDURE — 85025 COMPLETE CBC W/AUTO DIFF WBC: CPT | Performed by: INTERNAL MEDICINE

## 2019-06-03 PROCEDURE — 96417 CHEMO IV INFUS EACH ADDL SEQ: CPT

## 2019-06-03 PROCEDURE — 81001 URINALYSIS AUTO W/SCOPE: CPT | Performed by: NURSE PRACTITIONER

## 2019-06-03 PROCEDURE — 81003 URINALYSIS AUTO W/O SCOPE: CPT | Performed by: INTERNAL MEDICINE

## 2019-06-03 PROCEDURE — 25010000002 ATROPINE PER 0.01 MG: Performed by: INTERNAL MEDICINE

## 2019-06-03 PROCEDURE — 80053 COMPREHEN METABOLIC PANEL: CPT | Performed by: INTERNAL MEDICINE

## 2019-06-03 PROCEDURE — 96416 CHEMO PROLONG INFUSE W/PUMP: CPT

## 2019-06-03 PROCEDURE — 96375 TX/PRO/DX INJ NEW DRUG ADDON: CPT

## 2019-06-03 PROCEDURE — 99214 OFFICE O/P EST MOD 30 MIN: CPT | Performed by: NURSE PRACTITIONER

## 2019-06-03 PROCEDURE — 25010000002 LEUCOVORIN 500 MG RECONSTITUTED SOLUTION 1 EACH VIAL: Performed by: INTERNAL MEDICINE

## 2019-06-03 PROCEDURE — 96368 THER/DIAG CONCURRENT INF: CPT

## 2019-06-03 PROCEDURE — 96413 CHEMO IV INFUSION 1 HR: CPT

## 2019-06-03 PROCEDURE — 96411 CHEMO IV PUSH ADDL DRUG: CPT

## 2019-06-03 PROCEDURE — 25010000002 FLUOROURACIL PER 500 MG: Performed by: INTERNAL MEDICINE

## 2019-06-03 PROCEDURE — 25010000002 DEXAMETHASONE PER 1 MG: Performed by: INTERNAL MEDICINE

## 2019-06-03 PROCEDURE — 25010000002 PALONOSETRON PER 25 MCG: Performed by: INTERNAL MEDICINE

## 2019-06-03 RX ORDER — LEVOFLOXACIN 750 MG/1
750 TABLET ORAL DAILY
Qty: 7 TABLET | Refills: 0 | Status: SHIPPED | OUTPATIENT
Start: 2019-06-03 | End: 2019-07-01

## 2019-06-03 RX ORDER — SODIUM CHLORIDE 9 MG/ML
250 INJECTION, SOLUTION INTRAVENOUS ONCE
Status: COMPLETED | OUTPATIENT
Start: 2019-06-03 | End: 2019-06-03

## 2019-06-03 RX ORDER — AMOXICILLIN AND CLAVULANATE POTASSIUM 875; 125 MG/1; MG/1
1 TABLET, FILM COATED ORAL 2 TIMES DAILY
Qty: 14 TABLET | Refills: 0 | Status: SHIPPED | OUTPATIENT
Start: 2019-06-03 | End: 2019-06-03 | Stop reason: ALTCHOICE

## 2019-06-03 RX ORDER — PALONOSETRON 0.05 MG/ML
0.25 INJECTION, SOLUTION INTRAVENOUS ONCE
Status: COMPLETED | OUTPATIENT
Start: 2019-06-03 | End: 2019-06-03

## 2019-06-03 RX ORDER — FLUOROURACIL 50 MG/ML
400 INJECTION, SOLUTION INTRAVENOUS ONCE
Status: COMPLETED | OUTPATIENT
Start: 2019-06-03 | End: 2019-06-03

## 2019-06-03 RX ORDER — CLONIDINE HYDROCHLORIDE 0.1 MG/1
0.1 TABLET ORAL 2 TIMES DAILY
COMMUNITY
Start: 2019-05-26

## 2019-06-03 RX ORDER — ATROPINE SULFATE 1 MG/ML
0.25 INJECTION, SOLUTION INTRAMUSCULAR; INTRAVENOUS; SUBCUTANEOUS
Status: DISCONTINUED | OUTPATIENT
Start: 2019-06-03 | End: 2019-06-04 | Stop reason: HOSPADM

## 2019-06-03 RX ADMIN — FLUOROURACIL 660 MG: 50 INJECTION, SOLUTION INTRAVENOUS at 12:28

## 2019-06-03 RX ADMIN — BEVACIZUMAB 300 MG: 100 INJECTION, SOLUTION INTRAVENOUS at 10:18

## 2019-06-03 RX ADMIN — PALONOSETRON HYDROCHLORIDE 0.25 MG: 0.25 INJECTION, SOLUTION INTRAVENOUS at 09:49

## 2019-06-03 RX ADMIN — IRINOTECAN HYDROCHLORIDE 300 MG: 20 INJECTION, SOLUTION INTRAVENOUS at 10:50

## 2019-06-03 RX ADMIN — DEXAMETHASONE SODIUM PHOSPHATE 12 MG: 4 INJECTION, SOLUTION INTRAMUSCULAR; INTRAVENOUS at 09:50

## 2019-06-03 RX ADMIN — LEUCOVORIN CALCIUM 660 MG: 500 INJECTION, POWDER, LYOPHILIZED, FOR SOLUTION INTRAMUSCULAR; INTRAVENOUS at 10:50

## 2019-06-03 RX ADMIN — SODIUM CHLORIDE 250 ML: 9 INJECTION, SOLUTION INTRAVENOUS at 09:45

## 2019-06-03 RX ADMIN — FLUOROURACIL 4000 MG: 50 INJECTION, SOLUTION INTRAVENOUS at 12:34

## 2019-06-03 RX ADMIN — ATROPINE SULFATE 0.25 MG: 1 INJECTION, SOLUTION INTRAMUSCULAR; INTRAVENOUS; SUBCUTANEOUS at 10:48

## 2019-06-03 NOTE — PROGRESS NOTES
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     HISTORY OF PRESENT ILLNESS: The patient is a very pleasant 79 y.o. female  with past medical history significant for colon cancer diagnosed 07/04/18. The patient presented to [facility] for gastritis and right lower quadrant pain.  This has been going on for the last few months associated with nausea but no vomiting.  She's been having one of dark stools.  Never had this problem before.  She had previous negative colonoscopy.  Colon polyp was found and biopsied. Surgical pathology 07/04/18 revealed invasive poorly differentiated adenocarcinoma in ascending colon. CT abdomen and pelvis 07/06/18 showed mass within the ascending colon with large mercy mass in the adjacent mesentery and prominent nodes around the cecum. The patient presented to Dr Juarez for elective right hemicolectomy on 08/01/18. Pathology 08/01/18 from colon and terminal ileum resection revealed poorly differentiated colonic adenocarcinoma ([id number]) with metastatic adenocarcinoma to 1 of 25 lymph nodes.  She was started on adjuvant chemotherapy using oxaliplatin with Xeloda September 25, 2018.  The patient completed 5 cycles and then decide to stop secondary to multiple side effects.  Repeated scans done on March 16, 2019 revealed new liver metastases.  She was started on palliative chemotherapy with FOLFIRI plus Avastin March 25, 2019.  The patient is here today for follow up visit was cycle #6.        SUBJECTIVE: The patient is here today with her niece and granddaughter. She is feeling fairly well. She went to the ER once for elevated blood pressure. They prescribed clonidine to take as needed for hypertension. She has not had to take it since being discharged. She complains today of sore throat as well as sinus drainage and congestion. Her throat pain  Has persisted despite using magic  mouthwash. She complains of pain with swallowing as well as talking. She has no mouth sores. She is still eating and drinking well. She denies fever or chills. She has noted purulent and blood nasal drainage at times.     PAST MEDICAL HISTORY/SOCIAL HISTORY/FAMILY HISTORY: Reviewed by me and unchanged from my documentation done on 09/17/18.    Review of Systems   Constitutional: Positive for fatigue. Negative for activity change, appetite change, chills, fever and unexpected weight change.   HENT: Positive for congestion, postnasal drip and sore throat. Negative for hearing loss, mouth sores, nosebleeds and trouble swallowing.    Eyes: Negative for visual disturbance.   Respiratory: Negative for cough, chest tightness, shortness of breath and wheezing.    Cardiovascular: Negative for chest pain, palpitations and leg swelling.   Gastrointestinal: Positive for diarrhea and nausea. Negative for abdominal distention, abdominal pain, blood in stool, constipation, rectal pain and vomiting.   Endocrine: Negative for cold intolerance and heat intolerance.   Genitourinary: Negative for difficulty urinating, dysuria, frequency and urgency.   Musculoskeletal: Negative for arthralgias, back pain, gait problem, joint swelling and myalgias.   Skin: Positive for rash.   Neurological: Negative for dizziness, tremors, syncope, weakness, light-headedness, numbness and headaches.   Hematological: Negative for adenopathy. Does not bruise/bleed easily.   Psychiatric/Behavioral: Negative for confusion, sleep disturbance and suicidal ideas. The patient is not nervous/anxious.          Current Outpatient Medications:   •  atenolol (TENORMIN) 50 MG tablet, Take 50 mg by mouth Daily., Disp: , Rfl:   •  HYDROcodone-acetaminophen (NORCO)  MG per tablet, Take 1 tablet by mouth Every 6 (Six) Hours As Needed for Moderate Pain ., Disp: 120 tablet, Rfl: 0  •  HYDROcodone-acetaminophen (NORCO) 5-325 MG per tablet, Take 1-2 tablets by mouth  Every 6 (Six) Hours As Needed for pain, Disp: 10 tablet, Rfl: 0  •  lidocaine-prilocaine (EMLA) 2.5-2.5 % cream, Apply  topically to the appropriate area as directed As Needed (45-60 minutes prior to port access.  Cover with saran/plastic wrap.)., Disp: 30 g, Rfl: 3  •  lidocaine-prilocaine (EMLA) 2.5-2.5 % cream, Apply  topically to the appropriate area as directed As Needed (45-60 minutes prior to port access.  Cover with saran/plastic wrap.)., Disp: 30 g, Rfl: 3  •  megestrol (MEGACE) 40 MG/ML suspension, Take 20 mL by mouth Daily., Disp: 480 mL, Rfl: 5  •  nystatin susp + lidocaine viscous (MAGIC MOUTHWASH) oral suspension, 5-10 ml swish and spit or swallow QID prn, Disp: 240 mL, Rfl: 3  •  ondansetron (ZOFRAN) 8 MG tablet, Take 1 tablet by mouth Every 8 (Eight) Hours As Needed for Nausea., Disp: 60 tablet, Rfl: 5  •  ondansetron ODT (ZOFRAN-ODT) 4 MG disintegrating tablet, Take 1 tablet by mouth Every 6 (Six) Hours As Needed for Nausea or Vomiting., Disp: 15 tablet, Rfl: 0  •  raNITIdine (ZANTAC) 150 MG tablet, Take 150 mg by mouth As Needed for Heartburn., Disp: , Rfl:     PHYSICAL EXAMINATION:   There were no vitals taken for this visit.   ECOG Performance Status: 1 - Symptomatic but completely ambulatory  General Appearance:  alert, cooperative, no apparent distress and appears stated age   Neurologic/Psychiatric: A&O x 3, gait steady, appropriate affect, strength 5/5 in all muscle groups   HEENT:  Normocephalic, without obvious abnormality, mucous membranes moist, mild erythema noted to throat with tender cervical adenopathy on the left, no coating to tongue, however she does have deep ridges to the surface of her tongue.   Neck: Supple, symmetrical, trachea midline, no adenopathy;  No thyromegaly, masses, or tenderness   Lungs:   Clear to auscultation bilaterally; respirations regular, even, and unlabored bilaterally   Heart:  Regular rate and rhythm, no murmurs appreciated   Abdomen:   Soft, non-tender,  non-distended and no organomegaly   Lymph nodes: No cervical, supraclavicular, inguinal or axillary adenopathy noted   Extremities: Normal, atraumatic; no clubbing, cyanosis, or edema    Skin: No rashes, ulcers, or suspicious lesions noted     Lab on 05/29/2019   Component Date Value Ref Range Status   • CEA 05/29/2019 16.10  ng/mL Final   • Glucose 05/29/2019 85  65 - 99 mg/dL Final   • BUN 05/29/2019 11  8 - 23 mg/dL Final   • Creatinine 05/29/2019 0.88  0.57 - 1.00 mg/dL Final   • Sodium 05/29/2019 137  136 - 145 mmol/L Final   • Potassium 05/29/2019 4.2  3.5 - 5.2 mmol/L Final   • Chloride 05/29/2019 103  98 - 107 mmol/L Final   • CO2 05/29/2019 24.0  22.0 - 29.0 mmol/L Final   • Calcium 05/29/2019 9.2  8.6 - 10.5 mg/dL Final   • Total Protein 05/29/2019 7.6  6.0 - 8.5 g/dL Final   • Albumin 05/29/2019 4.10  3.50 - 5.20 g/dL Final   • ALT (SGPT) 05/29/2019 15  1 - 33 U/L Final   • AST (SGOT) 05/29/2019 18  1 - 32 U/L Final   • Alkaline Phosphatase 05/29/2019 93  39 - 117 U/L Final   • Total Bilirubin 05/29/2019 0.4  0.2 - 1.2 mg/dL Final   • eGFR Non African Amer 05/29/2019 62  >60 mL/min/1.73 Final   • Globulin 05/29/2019 3.5  gm/dL Final   • A/G Ratio 05/29/2019 1.2  g/dL Final   • BUN/Creatinine Ratio 05/29/2019 12.5  7.0 - 25.0 Final   • Anion Gap 05/29/2019 10.0  mmol/L Final   • Color, UA 05/29/2019 Yellow  Yellow, Straw Final   • Appearance, UA 05/29/2019 Clear  Clear Final   • pH, UA 05/29/2019 6.5  5.0 - 8.0 Final   • Specific Gravity, UA 05/29/2019 >=1.030  1.001 - 1.030 Final   • Glucose, UA 05/29/2019 Negative  Negative Final   • Ketones, UA 05/29/2019 Negative  Negative Final   • Bilirubin, UA 05/29/2019 Negative  Negative Final   • Blood, UA 05/29/2019 Negative  Negative Final   • Protein, UA 05/29/2019 Negative  Negative Final   • Leuk Esterase, UA 05/29/2019 Negative  Negative Final   • Nitrite, UA 05/29/2019 Negative  Negative Final   • Urobilinogen, UA 05/29/2019 0.2 E.U./dL  0.2 - 1.0 E.U./dL  Final   • WBC 05/29/2019 15.95* 3.40 - 10.80 10*3/mm3 Final   • RBC 05/29/2019 3.94  3.77 - 5.28 10*6/mm3 Final   • Hemoglobin 05/29/2019 11.9* 12.0 - 15.9 g/dL Final   • Hematocrit 05/29/2019 36.0  34.0 - 46.6 % Final   • MCV 05/29/2019 91.4  79.0 - 97.0 fL Final   • MCH 05/29/2019 30.2  26.6 - 33.0 pg Final   • MCHC 05/29/2019 33.1  31.5 - 35.7 g/dL Final   • RDW 05/29/2019 19.6* 12.3 - 15.4 % Final   • RDW-SD 05/29/2019 65.0* 37.0 - 54.0 fl Final   • MPV 05/29/2019 9.5  6.0 - 12.0 fL Final   • Platelets 05/29/2019 189  140 - 450 10*3/mm3 Final   • Neutrophil % 05/29/2019 15.0* 42.7 - 76.0 % Final   • Lymphocyte % 05/29/2019 73.0* 19.6 - 45.3 % Final   • Monocyte % 05/29/2019 2.0* 5.0 - 12.0 % Final   • Eosinophil % 05/29/2019 3.0  0.3 - 6.2 % Final   • Basophil % 05/29/2019 0.0  0.0 - 1.5 % Final   • Bands %  05/29/2019 2.0  0.0 - 5.0 % Final   • Metamyelocyte % 05/29/2019 1.0* 0.0 - 0.0 % Final   • Atypical Lymphocyte % 05/29/2019 4.0  0.0 - 5.0 % Final   • Neutrophils Absolute 05/29/2019 2.71  1.70 - 7.00 10*3/mm3 Final   • Lymphocytes Absolute 05/29/2019 11.64* 0.70 - 3.10 10*3/mm3 Final   • Monocytes Absolute 05/29/2019 0.32  0.10 - 0.90 10*3/mm3 Final   • Eosinophils Absolute 05/29/2019 0.48* 0.00 - 0.40 10*3/mm3 Final   • Basophils Absolute 05/29/2019 0.00  0.00 - 0.20 10*3/mm3 Final   • RBC Morphology 05/29/2019 Normal  Normal Final   • WBC Morphology 05/29/2019 Normal  Normal Final   • Platelet Morphology 05/29/2019 Normal  Normal Final        Ct Chest With Contrast    Result Date: 5/30/2019  Narrative: EXAMINATION: CT CHEST WITH CONTRAST-, CT ABDOMEN PELVIS AND WITH CONTRAST-05/29/2019:  INDICATION: F/U scan; C18.9-Malignant neoplasm of colon, unspecified, followup colon cancer, restaging.  TECHNIQUE: Multiple axial CT imaging was obtained of the chest, abdomen and pelvis following the administration of intravenous contrast.  The radiation dose reduction device was turned on for each scan per the ALARA  (As Low as Reasonably Achievable) protocol.  COMPARISON: 03/16/2019.  FINDINGS:  CHEST: The thyroid is homogeneous in appearance. Small lymph nodes identified throughout the mediastinum with no bulky adenopathy. The cardiac chambers are within normal limits. Vascular calcification seen within the coronary vessels and thoracic aorta. No pericardial effusion. There is some consolidation identified within the right lung base which is new when compared to the prior study. The findings may suggest infiltrate or atelectatic change. No pulmonary nodule identified. Close interval followup is recommended of the findings in the right lung base. Degenerative changes seen within the spine.  ABDOMEN: Low-density lesions seen throughout the liver. These lesions when compared to the prior study are slightly decreased in size in the interval for example, the lesion within the left lobe of the liver today measures 3.0 cm and previously measured 5.0 cm. There is extrahepatic biliary ductal dilatation which is stable. The gallbladder has been surgically removed. Cysts identified in both kidneys. There is no abdominal or retroperitoneal lymphadenopathy. The pancreas is homogeneous in appearance. No free fluid or free air.  PELVIS: The pelvic organs are unremarkable. The pelvic portions of the gastrointestinal tract are within normal limits. No pelvic adenopathy. Minimal physiologic free fluid seen within the pelvis. The bony structures reveal degenerative changes seen within the spine and pelvis. Extensive degenerative changes seen within the lower lumbar spine.      Impression: Improvement seen in the examination with decrease seen in size of the liver lesions. There is consolidation small in size at the right lung base which may suggest infiltrate or atelectatic change. Continued followup is recommended.  D:  05/29/2019 E:  05/29/2019  This report was finalized on 5/30/2019 10:14 AM by Dr. Eden Grant MD.      Ct Abdomen  Pelvis With Contrast    Result Date: 5/30/2019  Narrative: EXAMINATION: CT CHEST WITH CONTRAST-, CT ABDOMEN PELVIS AND WITH CONTRAST-05/29/2019:  INDICATION: F/U scan; C18.9-Malignant neoplasm of colon, unspecified, followup colon cancer, restaging.  TECHNIQUE: Multiple axial CT imaging was obtained of the chest, abdomen and pelvis following the administration of intravenous contrast.  The radiation dose reduction device was turned on for each scan per the ALARA (As Low as Reasonably Achievable) protocol.  COMPARISON: 03/16/2019.  FINDINGS:  CHEST: The thyroid is homogeneous in appearance. Small lymph nodes identified throughout the mediastinum with no bulky adenopathy. The cardiac chambers are within normal limits. Vascular calcification seen within the coronary vessels and thoracic aorta. No pericardial effusion. There is some consolidation identified within the right lung base which is new when compared to the prior study. The findings may suggest infiltrate or atelectatic change. No pulmonary nodule identified. Close interval followup is recommended of the findings in the right lung base. Degenerative changes seen within the spine.  ABDOMEN: Low-density lesions seen throughout the liver. These lesions when compared to the prior study are slightly decreased in size in the interval for example, the lesion within the left lobe of the liver today measures 3.0 cm and previously measured 5.0 cm. There is extrahepatic biliary ductal dilatation which is stable. The gallbladder has been surgically removed. Cysts identified in both kidneys. There is no abdominal or retroperitoneal lymphadenopathy. The pancreas is homogeneous in appearance. No free fluid or free air.  PELVIS: The pelvic organs are unremarkable. The pelvic portions of the gastrointestinal tract are within normal limits. No pelvic adenopathy. Minimal physiologic free fluid seen within the pelvis. The bony structures reveal degenerative changes seen within  the spine and pelvis. Extensive degenerative changes seen within the lower lumbar spine.      Impression: Improvement seen in the examination with decrease seen in size of the liver lesions. There is consolidation small in size at the right lung base which may suggest infiltrate or atelectatic change. Continued followup is recommended.  D:  05/29/2019 E:  05/29/2019  This report was finalized on 5/30/2019 10:14 AM by Dr. Eden Grant MD.        ASSESSMENT: The patient is a very pleasant 79 y.o. 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  Ascending colon adenocarcinoma T3 N1 M0 stage IIIa:  A.  Presenting with abdominal pain and irregular bowel movements  B.  Diagnosed after colonoscopy with a biopsy done on June 27, 2018  C.  Status post right hemicolectomy done by Dr. Wong August 01, 2018  D.  Final pathology revealed with 1 out of 25 positive mesenteric lymph nodes  E. Started adjuvant chemotherapy using Xeloda with oxaliplatin September 25, 2018, status post 5 cycles  F. CEA 17.8 March 25, 2019  G.  Progressive disease with diffuse liver metastases dominant CAT scan done on March 2019  H. Started on palliative chemotherapy with FOLFIRI plus Avastin March 25, 2019, status post 5 cycles.  I.  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  2.  Hypertension  3.  Normocytic anemia  4.  Two hypodense right hepatic lobe liver lesions.  5.  Chemotherapy-induced nausea  6. Pharyngitis/sinusitis     PLAN:   1. I will proceed with palliative chemotherapy with FOLFIRI plus Avastin as scheduled today cycle #6.  2.   I will continue to monitor the patient's blood work including blood counts, kidney function, liver functions, and electrolytes.  We will also check CEA on day 1 of each  cycle. I told her that it has continued to decrease with each treatment. We will check it again today as well.    3.  I will continue routine port care with each cycle of chemotherapy. She has EMLA cream to use prior to port access.   4.  The patient had microsatellite instability identified on gene analysis. We will consider immunotherapy in the future if needed for progression on current second line therapy.   5. The patient and her family have been made aware of the possibility of Benton syndrome. She was not interested in referral to genetics for testing and counseling at this time.   6.  We discussed potential side effects of chemotherapy including diarrhea, colitis, low blood counts, infusion reactions, dehydration, infection, and potential death.  7.  She will continue Norco 10/325 mg every 6 hours as needed for cancer related pain.  8.  She will continue  Zofran as needed for chemotherapy induced-nausea.  9.  She will follow-up with me in 2 weeks for cycle #7.  10.  I reviewed the CAT scan results with the patient and her daughter. I reviewed the images myself. I told the patient she has had good response to treatment with decrease in the size of the known liver lesions. She has had no progressive disease. We will repeat CAT scans in prior to cycle # 12, which will be 3 month follow up.  11.  We will continue to monitor the patient's blood pressure. She will continue atenolol 50 mg by mouth daily as well as clonidine 0.1 mg daily and adjust the dose if needed for changes in blood pressure while on treatment.  I asked her to continue to monitor her blood pressure at home.   12. We will add Amoxicillin 875 mg twice a day for 7 days for pharyngitis/sinusitis. I asked her to also continue use of Magic mouthwash as well as salt and soda rinses to help with throat soreness.       ADDENDUM: AFTER REVIEWED UA RESULTS AND PATIENT REPORTING SYMPTOMS OF URGENCY AND FREQUENCY TO INFUSION NURSE, WE WILL CHANGE ABX TO  LEVAQUIN 500 MG DAILY FOR 7 DAYS FOR URI AS WELL AS UTI.     Liana Cervantes, APRN  6/3/2019

## 2019-06-13 DIAGNOSIS — C18.9 MALIGNANT NEOPLASM OF COLON, UNSPECIFIED PART OF COLON (HCC): ICD-10-CM

## 2019-06-17 ENCOUNTER — OFFICE VISIT (OUTPATIENT)
Dept: ONCOLOGY | Facility: CLINIC | Age: 79
End: 2019-06-17

## 2019-06-17 ENCOUNTER — HOSPITAL ENCOUNTER (OUTPATIENT)
Dept: ONCOLOGY | Facility: HOSPITAL | Age: 79
Setting detail: INFUSION SERIES
Discharge: HOME | End: 2019-06-17

## 2019-06-17 ENCOUNTER — TELEPHONE (OUTPATIENT)
Dept: ONCOLOGY | Facility: CLINIC | Age: 79
End: 2019-06-17

## 2019-06-17 VITALS
BODY MASS INDEX: 26.93 KG/M2 | OXYGEN SATURATION: 97 % | HEART RATE: 69 BPM | WEIGHT: 152 LBS | SYSTOLIC BLOOD PRESSURE: 163 MMHG | DIASTOLIC BLOOD PRESSURE: 82 MMHG | RESPIRATION RATE: 18 BRPM

## 2019-06-17 VITALS — SYSTOLIC BLOOD PRESSURE: 162 MMHG | DIASTOLIC BLOOD PRESSURE: 72 MMHG

## 2019-06-17 DIAGNOSIS — C18.9 MALIGNANT NEOPLASM OF COLON, UNSPECIFIED PART OF COLON (HCC): Primary | ICD-10-CM

## 2019-06-17 LAB
ALBUMIN SERPL-MCNC: 3.8 G/DL (ref 3.5–5.2)
ALBUMIN/GLOB SERPL: 1.2 G/DL
ALP SERPL-CCNC: 106 U/L (ref 39–117)
ALT SERPL W P-5'-P-CCNC: 10 U/L (ref 1–33)
ANION GAP SERPL CALCULATED.3IONS-SCNC: 10 MMOL/L
AST SERPL-CCNC: 17 U/L (ref 1–32)
BILIRUB SERPL-MCNC: 0.2 MG/DL (ref 0.2–1.2)
BILIRUB UR QL STRIP: NEGATIVE
BUN BLD-MCNC: 13 MG/DL (ref 8–23)
BUN/CREAT SERPL: 13.7 (ref 7–25)
CALCIUM SPEC-SCNC: 8.7 MG/DL (ref 8.6–10.5)
CEA SERPL-MCNC: 13.7 NG/ML
CHLORIDE SERPL-SCNC: 103 MMOL/L (ref 98–107)
CLARITY UR: CLEAR
CO2 SERPL-SCNC: 23 MMOL/L (ref 22–29)
COLOR UR: YELLOW
CREAT BLD-MCNC: 0.95 MG/DL (ref 0.57–1)
ERYTHROCYTE [DISTWIDTH] IN BLOOD BY AUTOMATED COUNT: 19.9 % (ref 12.3–15.4)
GFR SERPL CREATININE-BSD FRML MDRD: 57 ML/MIN/1.73
GLOBULIN UR ELPH-MCNC: 3.3 GM/DL
GLUCOSE BLD-MCNC: 86 MG/DL (ref 65–99)
GLUCOSE UR STRIP-MCNC: NEGATIVE MG/DL
HCT VFR BLD AUTO: 33.5 % (ref 34–46.6)
HGB BLD-MCNC: 11.1 G/DL (ref 12–15.9)
HGB UR QL STRIP.AUTO: ABNORMAL
KETONES UR QL STRIP: NEGATIVE
LEUKOCYTE ESTERASE UR QL STRIP.AUTO: NEGATIVE
LYMPHOCYTES # BLD AUTO: 8.3 10*3/MM3 (ref 0.7–3.1)
LYMPHOCYTES NFR BLD AUTO: 62.2 % (ref 19.6–45.3)
MCH RBC QN AUTO: 30.1 PG (ref 26.6–33)
MCHC RBC AUTO-ENTMCNC: 33.1 G/DL (ref 31.5–35.7)
MCV RBC AUTO: 91 FL (ref 79–97)
MONOCYTES # BLD AUTO: 1.2 10*3/MM3 (ref 0.1–0.9)
MONOCYTES NFR BLD AUTO: 8.8 % (ref 5–12)
NEUTROPHILS # BLD AUTO: 3.9 10*3/MM3 (ref 1.7–7)
NEUTROPHILS NFR BLD AUTO: 29 % (ref 42.7–76)
NITRITE UR QL STRIP: NEGATIVE
PH UR STRIP.AUTO: 6 [PH] (ref 5–8)
PLATELET # BLD AUTO: 231 10*3/MM3 (ref 140–450)
PMV BLD AUTO: 7 FL (ref 6–12)
POTASSIUM BLD-SCNC: 4.3 MMOL/L (ref 3.5–5.2)
PROT SERPL-MCNC: 7.1 G/DL (ref 6–8.5)
PROT UR QL STRIP: NEGATIVE
RBC # BLD AUTO: 3.68 10*6/MM3 (ref 3.77–5.28)
SODIUM BLD-SCNC: 136 MMOL/L (ref 136–145)
SP GR UR STRIP: 1.01 (ref 1–1.03)
UROBILINOGEN UR QL STRIP: ABNORMAL
WBC NRBC COR # BLD: 13.3 10*3/MM3 (ref 3.4–10.8)

## 2019-06-17 PROCEDURE — 80053 COMPREHEN METABOLIC PANEL: CPT | Performed by: INTERNAL MEDICINE

## 2019-06-17 PROCEDURE — 96367 TX/PROPH/DG ADDL SEQ IV INF: CPT

## 2019-06-17 PROCEDURE — 25010000002 FLUOROURACIL PER 500 MG: Performed by: NURSE PRACTITIONER

## 2019-06-17 PROCEDURE — 96413 CHEMO IV INFUSION 1 HR: CPT

## 2019-06-17 PROCEDURE — 25010000002 LEUCOVORIN 500 MG RECONSTITUTED SOLUTION 1 EACH VIAL: Performed by: NURSE PRACTITIONER

## 2019-06-17 PROCEDURE — 25010000002 DEXAMETHASONE PER 1 MG: Performed by: NURSE PRACTITIONER

## 2019-06-17 PROCEDURE — 25010000002 PALONOSETRON PER 25 MCG: Performed by: NURSE PRACTITIONER

## 2019-06-17 PROCEDURE — 82378 CARCINOEMBRYONIC ANTIGEN: CPT | Performed by: INTERNAL MEDICINE

## 2019-06-17 PROCEDURE — 96417 CHEMO IV INFUS EACH ADDL SEQ: CPT

## 2019-06-17 PROCEDURE — 81003 URINALYSIS AUTO W/O SCOPE: CPT | Performed by: INTERNAL MEDICINE

## 2019-06-17 PROCEDURE — 25010000002 IRINOTECAN PER 20 MG: Performed by: NURSE PRACTITIONER

## 2019-06-17 PROCEDURE — 25010000002 LEUCOVORIN CALCIUM PER 50 MG: Performed by: NURSE PRACTITIONER

## 2019-06-17 PROCEDURE — 96416 CHEMO PROLONG INFUSE W/PUMP: CPT

## 2019-06-17 PROCEDURE — 99214 OFFICE O/P EST MOD 30 MIN: CPT | Performed by: NURSE PRACTITIONER

## 2019-06-17 PROCEDURE — 96415 CHEMO IV INFUSION ADDL HR: CPT

## 2019-06-17 PROCEDURE — 85025 COMPLETE CBC W/AUTO DIFF WBC: CPT | Performed by: INTERNAL MEDICINE

## 2019-06-17 PROCEDURE — 96368 THER/DIAG CONCURRENT INF: CPT

## 2019-06-17 PROCEDURE — 25010000002 BEVACIZUMAB PER 10 MG: Performed by: NURSE PRACTITIONER

## 2019-06-17 PROCEDURE — 96375 TX/PRO/DX INJ NEW DRUG ADDON: CPT

## 2019-06-17 PROCEDURE — 96411 CHEMO IV PUSH ADDL DRUG: CPT

## 2019-06-17 RX ORDER — PALONOSETRON 0.05 MG/ML
0.25 INJECTION, SOLUTION INTRAVENOUS ONCE
Status: COMPLETED | OUTPATIENT
Start: 2019-06-17 | End: 2019-06-17

## 2019-06-17 RX ORDER — FLUOROURACIL 50 MG/ML
400 INJECTION, SOLUTION INTRAVENOUS ONCE
Status: CANCELLED | OUTPATIENT
Start: 2019-06-17

## 2019-06-17 RX ORDER — FLUOROURACIL 50 MG/ML
400 INJECTION, SOLUTION INTRAVENOUS ONCE
Status: COMPLETED | OUTPATIENT
Start: 2019-06-17 | End: 2019-06-17

## 2019-06-17 RX ORDER — SODIUM CHLORIDE 9 MG/ML
250 INJECTION, SOLUTION INTRAVENOUS ONCE
Status: COMPLETED | OUTPATIENT
Start: 2019-06-17 | End: 2019-06-17

## 2019-06-17 RX ORDER — PALONOSETRON 0.05 MG/ML
0.25 INJECTION, SOLUTION INTRAVENOUS ONCE
Status: CANCELLED | OUTPATIENT
Start: 2019-06-17

## 2019-06-17 RX ORDER — ATROPINE SULFATE 1 MG/ML
0.25 INJECTION, SOLUTION INTRAMUSCULAR; INTRAVENOUS; SUBCUTANEOUS
Status: DISCONTINUED | OUTPATIENT
Start: 2019-06-17 | End: 2019-06-18 | Stop reason: HOSPADM

## 2019-06-17 RX ORDER — AMOXICILLIN AND CLAVULANATE POTASSIUM 875; 125 MG/1; MG/1
TABLET, FILM COATED ORAL
COMMUNITY
Start: 2019-06-12 | End: 2019-07-01

## 2019-06-17 RX ORDER — ATROPINE SULFATE 1 MG/ML
0.25 INJECTION, SOLUTION INTRAMUSCULAR; INTRAVENOUS; SUBCUTANEOUS
Status: CANCELLED | OUTPATIENT
Start: 2019-06-17

## 2019-06-17 RX ORDER — SODIUM CHLORIDE 9 MG/ML
250 INJECTION, SOLUTION INTRAVENOUS ONCE
Status: CANCELLED | OUTPATIENT
Start: 2019-06-17

## 2019-06-17 RX ADMIN — DEXTROSE MONOHYDRATE 300 MG: 50 INJECTION, SOLUTION INTRAVENOUS at 12:37

## 2019-06-17 RX ADMIN — DEXAMETHASONE SODIUM PHOSPHATE 12 MG: 4 INJECTION, SOLUTION INTRAMUSCULAR; INTRAVENOUS at 12:18

## 2019-06-17 RX ADMIN — PALONOSETRON HYDROCHLORIDE 0.25 MG: 0.25 INJECTION, SOLUTION INTRAVENOUS at 12:15

## 2019-06-17 RX ADMIN — FLUOROURACIL 4000 MG: 50 INJECTION, SOLUTION INTRAVENOUS at 14:21

## 2019-06-17 RX ADMIN — LEUCOVORIN CALCIUM 660 MG: 500 INJECTION, POWDER, LYOPHILIZED, FOR SOLUTION INTRAMUSCULAR; INTRAVENOUS at 12:37

## 2019-06-17 RX ADMIN — SODIUM CHLORIDE 250 ML: 9 INJECTION, SOLUTION INTRAVENOUS at 12:14

## 2019-06-17 RX ADMIN — FLUOROURACIL 660 MG: 50 INJECTION, SOLUTION INTRAVENOUS at 14:14

## 2019-06-17 RX ADMIN — BEVACIZUMAB 310 MG: 400 INJECTION, SOLUTION INTRAVENOUS at 11:39

## 2019-06-17 NOTE — PROGRESS NOTES
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     HISTORY OF PRESENT ILLNESS: The patient is a very pleasant 79 y.o. female  with past medical history significant for colon cancer diagnosed 07/04/18. The patient presented to [facility] for gastritis and right lower quadrant pain.  This has been going on for the last few months associated with nausea but no vomiting.  She's been having one of dark stools.  Never had this problem before.  She had previous negative colonoscopy.  Colon polyp was found and biopsied. Surgical pathology 07/04/18 revealed invasive poorly differentiated adenocarcinoma in ascending colon. CT abdomen and pelvis 07/06/18 showed mass within the ascending colon with large mercy mass in the adjacent mesentery and prominent nodes around the cecum. The patient presented to Dr Juarez for elective right hemicolectomy on 08/01/18. Pathology 08/01/18 from colon and terminal ileum resection revealed poorly differentiated colonic adenocarcinoma ([id number]) with metastatic adenocarcinoma to 1 of 25 lymph nodes.  She was started on adjuvant chemotherapy using oxaliplatin with Xeloda September 25, 2018.  The patient completed 5 cycles and then decide to stop secondary to multiple side effects.  Repeated scans done on March 16, 2019 revealed new liver metastases.  She was started on palliative chemotherapy with FOLFIRI plus Avastin March 25, 2019.  The patient is here today for follow up visit was cycle #7.        SUBJECTIVE: The patient is here today with her niece. She has been doing fairly well. She has continued to have some issues with her blood pressure. She has clonidine to take at home, which does help, however she was told by her local ER to only take it for blood pressure over 200 systolic. She completed her Levaquin for UTI and her urinary symptoms have improved. She continues to have some mild  sore throat as well as dryness and bleeding to her nasal passages. She was started on Amoxicillin by an urgent treatment center and still has 5 days left of treatment. It has not made any significant improvement. She denies fever or chills. She has some drainage in her throat, worse in the morning. She also notes some blood drainage when she blows her nose.  She has been under a lot of stress lately dealing with her niece who has recently gotten out of long term. She thinks this has contributed to her elevated blood pressure.     PAST MEDICAL HISTORY/SOCIAL HISTORY/FAMILY HISTORY: Reviewed by me and unchanged from my documentation done on 09/17/18.    Review of Systems   Constitutional: Positive for fatigue. Negative for activity change, appetite change, chills, fever and unexpected weight change.   HENT: Positive for congestion, postnasal drip and sore throat. Negative for hearing loss, mouth sores, nosebleeds and trouble swallowing.    Eyes: Negative for visual disturbance.   Respiratory: Negative for cough, chest tightness, shortness of breath and wheezing.    Cardiovascular: Negative for chest pain, palpitations and leg swelling.        Elevated blood pressure   Gastrointestinal: Positive for nausea. Negative for abdominal distention, abdominal pain, blood in stool, constipation, diarrhea, rectal pain and vomiting.   Endocrine: Negative for cold intolerance and heat intolerance.   Genitourinary: Negative for difficulty urinating, dysuria, frequency and urgency.   Musculoskeletal: Negative for arthralgias, back pain, gait problem, joint swelling and myalgias.   Skin: Negative for rash.   Neurological: Negative for dizziness, tremors, syncope, weakness, light-headedness, numbness and headaches.   Hematological: Negative for adenopathy. Does not bruise/bleed easily.   Psychiatric/Behavioral: Negative for confusion, sleep disturbance and suicidal ideas. The patient is not nervous/anxious.          Current Outpatient  Medications:   •  atenolol (TENORMIN) 50 MG tablet, Take 50 mg by mouth Daily., Disp: , Rfl:   •  CloNIDine (CATAPRES) 0.1 MG tablet, , Disp: , Rfl:   •  HYDROcodone-acetaminophen (NORCO)  MG per tablet, Take 1 tablet by mouth Every 6 (Six) Hours As Needed for Moderate Pain ., Disp: 120 tablet, Rfl: 0  •  HYDROcodone-acetaminophen (NORCO) 5-325 MG per tablet, Take 1-2 tablets by mouth Every 6 (Six) Hours As Needed for pain, Disp: 10 tablet, Rfl: 0  •  levoFLOXacin (LEVAQUIN) 750 MG tablet, Take 1 tablet by mouth Daily., Disp: 7 tablet, Rfl: 0  •  lidocaine-prilocaine (EMLA) 2.5-2.5 % cream, Apply  topically to the appropriate area as directed As Needed (45-60 minutes prior to port access.  Cover with saran/plastic wrap.)., Disp: 30 g, Rfl: 3  •  lidocaine-prilocaine (EMLA) 2.5-2.5 % cream, Apply  topically to the appropriate area as directed As Needed (45-60 minutes prior to port access.  Cover with saran/plastic wrap.)., Disp: 30 g, Rfl: 3  •  megestrol (MEGACE) 40 MG/ML suspension, Take 20 mL by mouth Daily., Disp: 480 mL, Rfl: 5  •  nystatin susp + lidocaine viscous (MAGIC MOUTHWASH) oral suspension, 5-10 ml swish and spit or swallow QID prn, Disp: 240 mL, Rfl: 3  •  ondansetron (ZOFRAN) 8 MG tablet, Take 1 tablet by mouth Every 8 (Eight) Hours As Needed for Nausea., Disp: 60 tablet, Rfl: 5  •  ondansetron ODT (ZOFRAN-ODT) 4 MG disintegrating tablet, Take 1 tablet by mouth Every 6 (Six) Hours As Needed for Nausea or Vomiting., Disp: 15 tablet, Rfl: 0  •  raNITIdine (ZANTAC) 150 MG tablet, Take 150 mg by mouth As Needed for Heartburn., Disp: , Rfl:     PHYSICAL EXAMINATION:   There were no vitals taken for this visit.   ECOG Performance Status: 1 - Symptomatic but completely ambulatory  General Appearance:  alert, cooperative, no apparent distress and appears stated age   Neurologic/Psychiatric: A&O x 3, gait steady, appropriate affect, strength 5/5 in all muscle groups   HEENT:  Normocephalic, without  obvious abnormality, mucous membranes moist, mild erythema noted to throat with tender cervical adenopathy on the left, no coating to tongue, no ulceration, mild erythema noted to oropharynx.    Neck: Supple, symmetrical, trachea midline, no adenopathy;  No thyromegaly, masses, or tenderness   Lungs:   Clear to auscultation bilaterally; respirations regular, even, and unlabored bilaterally   Heart:  Regular rate and rhythm, no murmurs appreciated   Abdomen:   Soft, non-tender, non-distended and no organomegaly   Lymph nodes: No cervical, supraclavicular, inguinal or axillary adenopathy noted   Extremities: Normal, atraumatic; no clubbing, cyanosis, or edema    Skin: No rashes, ulcers, or suspicious lesions noted     No visits with results within 2 Week(s) from this visit.   Latest known visit with results is:   Hospital Outpatient Visit on 06/03/2019   Component Date Value Ref Range Status   • Color, UA 06/03/2019 Yellow  Yellow, Straw Final   • Appearance, UA 06/03/2019 Cloudy* Clear Final   • pH, UA 06/03/2019 6.0  5.0 - 8.0 Final   • Specific Gravity, UA 06/03/2019 1.020  1.005 - 1.030 Final   • Glucose, UA 06/03/2019 Negative  Negative Final   • Ketones, UA 06/03/2019 Negative  Negative Final   • Bilirubin, UA 06/03/2019 Negative  Negative Final   • Blood, UA 06/03/2019 Large (3+)* Negative Final   • Protein, UA 06/03/2019 Trace* Negative Final   • Leuk Esterase, UA 06/03/2019 Moderate (2+)* Negative Final   • Nitrite, UA 06/03/2019 Positive* Negative Final   • Urobilinogen, UA 06/03/2019 0.2 E.U./dL  0.2 - 1.0 E.U./dL Final   • Glucose 06/03/2019 68  65 - 99 mg/dL Final   • BUN 06/03/2019 13  8 - 23 mg/dL Final   • Creatinine 06/03/2019 0.81  0.57 - 1.00 mg/dL Final   • Sodium 06/03/2019 134* 136 - 145 mmol/L Final   • Potassium 06/03/2019 4.2  3.5 - 5.2 mmol/L Final   • Chloride 06/03/2019 101  98 - 107 mmol/L Final   • CO2 06/03/2019 23.0  22.0 - 29.0 mmol/L Final   • Calcium 06/03/2019 9.3  8.6 - 10.5 mg/dL  Final   • Total Protein 06/03/2019 7.7  6.0 - 8.5 g/dL Final   • Albumin 06/03/2019 4.00  3.50 - 5.20 g/dL Final   • ALT (SGPT) 06/03/2019 12  1 - 33 U/L Final   • AST (SGOT) 06/03/2019 16  1 - 32 U/L Final   • Alkaline Phosphatase 06/03/2019 103  39 - 117 U/L Final   • Total Bilirubin 06/03/2019 0.3  0.2 - 1.2 mg/dL Final   • eGFR Non African Amer 06/03/2019 68  >60 mL/min/1.73 Final   • Globulin 06/03/2019 3.7  gm/dL Final   • A/G Ratio 06/03/2019 1.1  g/dL Final   • BUN/Creatinine Ratio 06/03/2019 16.0  7.0 - 25.0 Final   • Anion Gap 06/03/2019 10.0  mmol/L Final   • WBC 06/03/2019 13.50* 3.40 - 10.80 10*3/mm3 Final   • RBC 06/03/2019 3.65* 3.77 - 5.28 10*6/mm3 Final   • Hemoglobin 06/03/2019 11.1* 12.0 - 15.9 g/dL Final   • Hematocrit 06/03/2019 33.3* 34.0 - 46.6 % Final   • RDW 06/03/2019 21.4* 12.3 - 15.4 % Final   • MCV 06/03/2019 91.3  79.0 - 97.0 fL Final   • MCH 06/03/2019 30.4  26.6 - 33.0 pg Final   • MCHC 06/03/2019 33.3  31.5 - 35.7 g/dL Final   • MPV 06/03/2019 7.1  6.0 - 12.0 fL Final   • Platelets 06/03/2019 248  140 - 450 10*3/mm3 Final   • Neutrophil % 06/03/2019 32.3* 42.7 - 76.0 % Final   • Lymphocyte % 06/03/2019 60.2* 19.6 - 45.3 % Final   • Monocyte % 06/03/2019 7.5  5.0 - 12.0 % Final   • Neutrophils, Absolute 06/03/2019 4.40  1.70 - 7.00 10*3/mm3 Final   • Lymphocytes, Absolute 06/03/2019 8.10* 0.70 - 3.10 10*3/mm3 Final   • Monocytes, Absolute 06/03/2019 1.00* 0.10 - 0.90 10*3/mm3 Final   • Color, UA 06/03/2019 Yellow  Yellow, Straw Final   • Appearance, UA 06/03/2019 Cloudy* Clear Final   • pH, UA 06/03/2019 6.0  5.0 - 8.0 Final   • Specific Gravity, UA 06/03/2019 1.010  1.005 - 1.030 Final   • Glucose, UA 06/03/2019 Negative  Negative Final   • Ketones, UA 06/03/2019 Negative  Negative Final   • Bilirubin, UA 06/03/2019 Negative  Negative Final   • Blood, UA 06/03/2019 Moderate (2+)* Negative Final   • Protein, UA 06/03/2019 Negative  Negative Final   • Leuk Esterase, UA 06/03/2019  Moderate (2+)* Negative Final   • Nitrite, UA 06/03/2019 Positive* Negative Final   • Urobilinogen, UA 06/03/2019 0.2 E.U./dL  0.2 - 1.0 E.U./dL Final   • RBC, UA 06/03/2019 21-30* None Seen, 0-2 /HPF Final   • WBC, UA 06/03/2019 Too Numerous to Count* None Seen, 0-2 /HPF Final   • Bacteria, UA 06/03/2019 4+* None Seen, Trace /HPF Final   • Squamous Epithelial Cells, UA 06/03/2019 0-2  None Seen, 0-2 /HPF Final   • Hyaline Casts, UA 06/03/2019 Unable to determine due to loaded field  0 - 6 /LPF Final   • Methodology 06/03/2019 Manual Light Microscopy   Final        Ct Chest With Contrast    Result Date: 5/30/2019  Narrative: EXAMINATION: CT CHEST WITH CONTRAST-, CT ABDOMEN PELVIS AND WITH CONTRAST-05/29/2019:  INDICATION: F/U scan; C18.9-Malignant neoplasm of colon, unspecified, followup colon cancer, restaging.  TECHNIQUE: Multiple axial CT imaging was obtained of the chest, abdomen and pelvis following the administration of intravenous contrast.  The radiation dose reduction device was turned on for each scan per the ALARA (As Low as Reasonably Achievable) protocol.  COMPARISON: 03/16/2019.  FINDINGS:  CHEST: The thyroid is homogeneous in appearance. Small lymph nodes identified throughout the mediastinum with no bulky adenopathy. The cardiac chambers are within normal limits. Vascular calcification seen within the coronary vessels and thoracic aorta. No pericardial effusion. There is some consolidation identified within the right lung base which is new when compared to the prior study. The findings may suggest infiltrate or atelectatic change. No pulmonary nodule identified. Close interval followup is recommended of the findings in the right lung base. Degenerative changes seen within the spine.  ABDOMEN: Low-density lesions seen throughout the liver. These lesions when compared to the prior study are slightly decreased in size in the interval for example, the lesion within the left lobe of the liver today  measures 3.0 cm and previously measured 5.0 cm. There is extrahepatic biliary ductal dilatation which is stable. The gallbladder has been surgically removed. Cysts identified in both kidneys. There is no abdominal or retroperitoneal lymphadenopathy. The pancreas is homogeneous in appearance. No free fluid or free air.  PELVIS: The pelvic organs are unremarkable. The pelvic portions of the gastrointestinal tract are within normal limits. No pelvic adenopathy. Minimal physiologic free fluid seen within the pelvis. The bony structures reveal degenerative changes seen within the spine and pelvis. Extensive degenerative changes seen within the lower lumbar spine.      Impression: Improvement seen in the examination with decrease seen in size of the liver lesions. There is consolidation small in size at the right lung base which may suggest infiltrate or atelectatic change. Continued followup is recommended.  D:  05/29/2019 E:  05/29/2019  This report was finalized on 5/30/2019 10:14 AM by Dr. Eden Grant MD.      Ct Abdomen Pelvis With Contrast    Result Date: 5/30/2019  Narrative: EXAMINATION: CT CHEST WITH CONTRAST-, CT ABDOMEN PELVIS AND WITH CONTRAST-05/29/2019:  INDICATION: F/U scan; C18.9-Malignant neoplasm of colon, unspecified, followup colon cancer, restaging.  TECHNIQUE: Multiple axial CT imaging was obtained of the chest, abdomen and pelvis following the administration of intravenous contrast.  The radiation dose reduction device was turned on for each scan per the ALARA (As Low as Reasonably Achievable) protocol.  COMPARISON: 03/16/2019.  FINDINGS:  CHEST: The thyroid is homogeneous in appearance. Small lymph nodes identified throughout the mediastinum with no bulky adenopathy. The cardiac chambers are within normal limits. Vascular calcification seen within the coronary vessels and thoracic aorta. No pericardial effusion. There is some consolidation identified within the right lung base which is new  when compared to the prior study. The findings may suggest infiltrate or atelectatic change. No pulmonary nodule identified. Close interval followup is recommended of the findings in the right lung base. Degenerative changes seen within the spine.  ABDOMEN: Low-density lesions seen throughout the liver. These lesions when compared to the prior study are slightly decreased in size in the interval for example, the lesion within the left lobe of the liver today measures 3.0 cm and previously measured 5.0 cm. There is extrahepatic biliary ductal dilatation which is stable. The gallbladder has been surgically removed. Cysts identified in both kidneys. There is no abdominal or retroperitoneal lymphadenopathy. The pancreas is homogeneous in appearance. No free fluid or free air.  PELVIS: The pelvic organs are unremarkable. The pelvic portions of the gastrointestinal tract are within normal limits. No pelvic adenopathy. Minimal physiologic free fluid seen within the pelvis. The bony structures reveal degenerative changes seen within the spine and pelvis. Extensive degenerative changes seen within the lower lumbar spine.      Impression: Improvement seen in the examination with decrease seen in size of the liver lesions. There is consolidation small in size at the right lung base which may suggest infiltrate or atelectatic change. Continued followup is recommended.  D:  05/29/2019 E:  05/29/2019  This report was finalized on 5/30/2019 10:14 AM by Dr. Eden Grant MD.        ASSESSMENT: The patient is a very pleasant 79 y.o. 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  Ascending colon adenocarcinoma T3 N1 M0 stage IIIa:  A.  Presenting with abdominal pain and irregular bowel movements  B.  Diagnosed after colonoscopy with a biopsy done on June 27, 2018  C.  Status post right hemicolectomy done by Dr. Wong August 01, 2018  D.  Final pathology revealed with  1 out of 25 positive mesenteric lymph nodes  E. Started adjuvant chemotherapy using Xeloda with oxaliplatin September 25, 2018, status post 5 cycles  F. CEA 17.8 March 25, 2019  G.  Progressive disease with diffuse liver metastases dominant CAT scan done on March 2019  H. Started on palliative chemotherapy with FOLFIRI plus Avastin March 25, 2019, status post 6 cycles.  I.  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  2.  Hypertension  3.  Normocytic anemia  4.  Two hypodense right hepatic lobe liver lesions.  5.  Chemotherapy-induced nausea  6. Pharyngitis     PLAN:   1. I will proceed with palliative chemotherapy with FOLFIRI plus Avastin as scheduled today cycle #7.  2.   I will continue to monitor the patient's blood work including blood counts, kidney function, liver functions, and electrolytes.  We will also check CEA on day 1 of each cycle. I told her that it has continued to decrease with each treatment.   3.  I will continue routine port care with each cycle of chemotherapy. She has EMLA cream to use prior to port access.   4.  The patient had microsatellite instability identified on gene analysis. We will consider immunotherapy in the future if needed for progression on current second line therapy.   5. The patient and her family have been made aware of the possibility of Benton syndrome. She was not interested in referral to genetics for testing and counseling at this time.   6.  We discussed potential side effects of chemotherapy including diarrhea, colitis, low blood counts, infusion reactions, dehydration, infection, and potential death.  7.  She will continue Norco 10/325 mg every 6 hours as needed for cancer related pain.  8.  She will continue  Zofran as needed for chemotherapy induced-nausea.  9.  She will follow-up with me in  2 weeks for cycle #8.  10.  We will repeat CAT scans in prior to cycle # 12, which will be 3 month follow up.  11.  We will continue atenolol 50 mg daily as well as clonidine 0.1 mg twice a day for hypertension. I gave her a refill on clonidine today. I asked her to monitor her blood pressure at home. We will adjust her regimen if needed for ongoing hypertension.   12. I told the patient to try saline nose spray as well as humidifier for bloody/dry nasal passages as well as sinus drainage. I think this is contributing to her sore throat as she has had no change in symptoms with antibiotics. She also has magic mouthwash to use if needed for soreness.       Liana Cervantes, APRN  6/17/2019

## 2019-06-17 NOTE — TELEPHONE ENCOUNTER
----- Message from Ariana Queen RN sent at 6/17/2019 10:34 AM EDT -----  Regarding: BANDAR Pedraza's BP is 162/72.    Okay to treat with Avastin?    Thank you,  Ariana [id number]

## 2019-06-19 ENCOUNTER — TELEPHONE (OUTPATIENT)
Dept: ONCOLOGY | Facility: CLINIC | Age: 79
End: 2019-06-19

## 2019-06-19 NOTE — TELEPHONE ENCOUNTER
Faxed order for port flushes to [facility] in [facility]   Fax # [phone]    Per request from [facility]

## 2019-06-19 NOTE — TELEPHONE ENCOUNTER
----- Message from Meron Solano RN sent at 6/19/2019  1:48 PM EDT -----  Regarding: [place]-port flushes   Contact: [phone]  Abby with [facility] called and left a voicemail regarding port flushes.  She needs port flushes set up for patient.  She also wants to know if it is ok to flush with just saline or if heparin is also needed.  You can reach her at [phone].  Thanks!

## 2019-07-01 ENCOUNTER — OFFICE VISIT (OUTPATIENT)
Dept: ONCOLOGY | Facility: CLINIC | Age: 79
End: 2019-07-01

## 2019-07-01 ENCOUNTER — HOSPITAL ENCOUNTER (OUTPATIENT)
Dept: ONCOLOGY | Facility: HOSPITAL | Age: 79
Setting detail: INFUSION SERIES
Discharge: HOME | End: 2019-07-01

## 2019-07-01 ENCOUNTER — TELEPHONE (OUTPATIENT)
Dept: ONCOLOGY | Facility: CLINIC | Age: 79
End: 2019-07-01

## 2019-07-01 VITALS
WEIGHT: 151 LBS | BODY MASS INDEX: 26.75 KG/M2 | HEART RATE: 70 BPM | DIASTOLIC BLOOD PRESSURE: 83 MMHG | RESPIRATION RATE: 12 BRPM | OXYGEN SATURATION: 98 % | TEMPERATURE: 97.4 F | HEIGHT: 63 IN | SYSTOLIC BLOOD PRESSURE: 183 MMHG

## 2019-07-01 DIAGNOSIS — C18.9 MALIGNANT NEOPLASM OF COLON, UNSPECIFIED PART OF COLON (HCC): Primary | ICD-10-CM

## 2019-07-01 LAB
ALBUMIN SERPL-MCNC: 3.7 G/DL (ref 3.5–5.2)
ALBUMIN/GLOB SERPL: 1.2 G/DL
ALP SERPL-CCNC: 101 U/L (ref 39–117)
ALT SERPL W P-5'-P-CCNC: 12 U/L (ref 1–33)
ANION GAP SERPL CALCULATED.3IONS-SCNC: 13 MMOL/L (ref 5–15)
AST SERPL-CCNC: 15 U/L (ref 1–32)
BILIRUB SERPL-MCNC: 0.5 MG/DL (ref 0.2–1.2)
BILIRUB UR QL STRIP: ABNORMAL
BUN BLD-MCNC: 18 MG/DL (ref 8–23)
BUN/CREAT SERPL: 25.4 (ref 7–25)
CALCIUM SPEC-SCNC: 8.3 MG/DL (ref 8.6–10.5)
CEA SERPL-MCNC: 11.4 NG/ML
CHLORIDE SERPL-SCNC: 105 MMOL/L (ref 98–107)
CLARITY UR: CLEAR
CO2 SERPL-SCNC: 17 MMOL/L (ref 22–29)
COLOR UR: YELLOW
CREAT BLD-MCNC: 0.71 MG/DL (ref 0.57–1)
ERYTHROCYTE [DISTWIDTH] IN BLOOD BY AUTOMATED COUNT: 19.9 % (ref 12.3–15.4)
GFR SERPL CREATININE-BSD FRML MDRD: 79 ML/MIN/1.73
GLOBULIN UR ELPH-MCNC: 3.1 GM/DL
GLUCOSE BLD-MCNC: 125 MG/DL (ref 65–99)
GLUCOSE UR STRIP-MCNC: NEGATIVE MG/DL
HCT VFR BLD AUTO: 33.4 % (ref 34–46.6)
HGB BLD-MCNC: 11.1 G/DL (ref 12–15.9)
HGB UR QL STRIP.AUTO: NEGATIVE
KETONES UR QL STRIP: NEGATIVE
LEUKOCYTE ESTERASE UR QL STRIP.AUTO: NEGATIVE
LYMPHOCYTES # BLD AUTO: 8 10*3/MM3 (ref 0.7–3.1)
LYMPHOCYTES NFR BLD AUTO: 58.1 % (ref 19.6–45.3)
MCH RBC QN AUTO: 30.5 PG (ref 26.6–33)
MCHC RBC AUTO-ENTMCNC: 33.2 G/DL (ref 31.5–35.7)
MCV RBC AUTO: 91.9 FL (ref 79–97)
MONOCYTES # BLD AUTO: 1 10*3/MM3 (ref 0.1–0.9)
MONOCYTES NFR BLD AUTO: 7.1 % (ref 5–12)
NEUTROPHILS # BLD AUTO: 4.8 10*3/MM3 (ref 1.7–7)
NEUTROPHILS NFR BLD AUTO: 34.8 % (ref 42.7–76)
NITRITE UR QL STRIP: NEGATIVE
PH UR STRIP.AUTO: 6 [PH] (ref 5–8)
PLATELET # BLD AUTO: 199 10*3/MM3 (ref 140–450)
PMV BLD AUTO: 7 FL (ref 6–12)
POTASSIUM BLD-SCNC: 4 MMOL/L (ref 3.5–5.2)
PROT SERPL-MCNC: 6.8 G/DL (ref 6–8.5)
PROT UR QL STRIP: ABNORMAL
RBC # BLD AUTO: 3.64 10*6/MM3 (ref 3.77–5.28)
SODIUM BLD-SCNC: 135 MMOL/L (ref 136–145)
SP GR UR STRIP: 1.02 (ref 1–1.03)
UROBILINOGEN UR QL STRIP: ABNORMAL
WBC NRBC COR # BLD: 13.8 10*3/MM3 (ref 3.4–10.8)

## 2019-07-01 PROCEDURE — 96368 THER/DIAG CONCURRENT INF: CPT

## 2019-07-01 PROCEDURE — 80053 COMPREHEN METABOLIC PANEL: CPT | Performed by: INTERNAL MEDICINE

## 2019-07-01 PROCEDURE — 25010000002 LEUCOVORIN CALCIUM PER 50 MG: Performed by: INTERNAL MEDICINE

## 2019-07-01 PROCEDURE — 96411 CHEMO IV PUSH ADDL DRUG: CPT

## 2019-07-01 PROCEDURE — 81003 URINALYSIS AUTO W/O SCOPE: CPT | Performed by: INTERNAL MEDICINE

## 2019-07-01 PROCEDURE — 25010000002 IRINOTECAN PER 20 MG: Performed by: INTERNAL MEDICINE

## 2019-07-01 PROCEDURE — 85025 COMPLETE CBC W/AUTO DIFF WBC: CPT | Performed by: INTERNAL MEDICINE

## 2019-07-01 PROCEDURE — 25010000002 BEVACIZUMAB PER 10 MG: Performed by: INTERNAL MEDICINE

## 2019-07-01 PROCEDURE — 25010000002 FLUOROURACIL PER 500 MG: Performed by: INTERNAL MEDICINE

## 2019-07-01 PROCEDURE — 96375 TX/PRO/DX INJ NEW DRUG ADDON: CPT

## 2019-07-01 PROCEDURE — 99215 OFFICE O/P EST HI 40 MIN: CPT | Performed by: INTERNAL MEDICINE

## 2019-07-01 PROCEDURE — 96417 CHEMO IV INFUS EACH ADDL SEQ: CPT

## 2019-07-01 PROCEDURE — 96415 CHEMO IV INFUSION ADDL HR: CPT

## 2019-07-01 PROCEDURE — 25010000002 ATROPINE PER 0.01 MG: Performed by: INTERNAL MEDICINE

## 2019-07-01 PROCEDURE — 82378 CARCINOEMBRYONIC ANTIGEN: CPT | Performed by: INTERNAL MEDICINE

## 2019-07-01 PROCEDURE — 96413 CHEMO IV INFUSION 1 HR: CPT

## 2019-07-01 PROCEDURE — 25010000002 PALONOSETRON PER 25 MCG: Performed by: INTERNAL MEDICINE

## 2019-07-01 PROCEDURE — 25010000002 DEXAMETHASONE PER 1 MG: Performed by: INTERNAL MEDICINE

## 2019-07-01 PROCEDURE — 25010000002

## 2019-07-01 PROCEDURE — 96366 THER/PROPH/DIAG IV INF ADDON: CPT

## 2019-07-01 PROCEDURE — 96416 CHEMO PROLONG INFUSE W/PUMP: CPT

## 2019-07-01 PROCEDURE — 96365 THER/PROPH/DIAG IV INF INIT: CPT

## 2019-07-01 RX ORDER — ATROPINE SULFATE 1 MG/ML
0.25 INJECTION, SOLUTION INTRAMUSCULAR; INTRAVENOUS; SUBCUTANEOUS
Status: CANCELLED | OUTPATIENT
Start: 2019-07-01

## 2019-07-01 RX ORDER — SODIUM CHLORIDE 9 MG/ML
250 INJECTION, SOLUTION INTRAVENOUS ONCE
Status: COMPLETED | OUTPATIENT
Start: 2019-07-01 | End: 2019-07-01

## 2019-07-01 RX ORDER — SODIUM CHLORIDE 9 MG/ML
250 INJECTION, SOLUTION INTRAVENOUS ONCE
Status: CANCELLED | OUTPATIENT
Start: 2019-07-01

## 2019-07-01 RX ORDER — PALONOSETRON 0.05 MG/ML
0.25 INJECTION, SOLUTION INTRAVENOUS ONCE
Status: CANCELLED | OUTPATIENT
Start: 2019-07-29

## 2019-07-01 RX ORDER — FLUOROURACIL 50 MG/ML
300 INJECTION, SOLUTION INTRAVENOUS ONCE
Status: CANCELLED | OUTPATIENT
Start: 2019-07-29

## 2019-07-01 RX ORDER — FLUOROURACIL 50 MG/ML
300 INJECTION, SOLUTION INTRAVENOUS ONCE
Status: CANCELLED | OUTPATIENT
Start: 2019-07-01

## 2019-07-01 RX ORDER — PALONOSETRON 0.05 MG/ML
0.25 INJECTION, SOLUTION INTRAVENOUS ONCE
Status: COMPLETED | OUTPATIENT
Start: 2019-07-01 | End: 2019-07-01

## 2019-07-01 RX ORDER — ATROPINE SULFATE 1 MG/ML
0.25 INJECTION, SOLUTION INTRAMUSCULAR; INTRAVENOUS; SUBCUTANEOUS
Status: CANCELLED | OUTPATIENT
Start: 2019-07-29

## 2019-07-01 RX ORDER — SODIUM CHLORIDE 9 MG/ML
250 INJECTION, SOLUTION INTRAVENOUS ONCE
Status: CANCELLED | OUTPATIENT
Start: 2019-07-29

## 2019-07-01 RX ORDER — ATROPINE SULFATE 1 MG/ML
0.25 INJECTION, SOLUTION INTRAMUSCULAR; INTRAVENOUS; SUBCUTANEOUS
Status: DISCONTINUED | OUTPATIENT
Start: 2019-07-01 | End: 2019-07-02 | Stop reason: HOSPADM

## 2019-07-01 RX ORDER — FLUOROURACIL 50 MG/ML
400 INJECTION, SOLUTION INTRAVENOUS ONCE
Status: CANCELLED | OUTPATIENT
Start: 2019-07-01

## 2019-07-01 RX ORDER — FLUOROURACIL 50 MG/ML
300 INJECTION, SOLUTION INTRAVENOUS ONCE
Status: COMPLETED | OUTPATIENT
Start: 2019-07-01 | End: 2019-07-01

## 2019-07-01 RX ORDER — PALONOSETRON 0.05 MG/ML
0.25 INJECTION, SOLUTION INTRAVENOUS ONCE
Status: CANCELLED | OUTPATIENT
Start: 2019-07-01

## 2019-07-01 RX ADMIN — FLUOROURACIL 490 MG: 50 INJECTION, SOLUTION INTRAVENOUS at 15:54

## 2019-07-01 RX ADMIN — BEVACIZUMAB 310 MG: 400 INJECTION, SOLUTION INTRAVENOUS at 13:41

## 2019-07-01 RX ADMIN — FLUOROURACIL 3000 MG: 50 INJECTION, SOLUTION INTRAVENOUS at 15:58

## 2019-07-01 RX ADMIN — ATROPINE SULFATE 0.25 MG: 1 INJECTION, SOLUTION INTRAMUSCULAR; INTRAVENOUS; SUBCUTANEOUS at 14:14

## 2019-07-01 RX ADMIN — PALONOSETRON HYDROCHLORIDE 0.25 MG: 0.25 INJECTION, SOLUTION INTRAVENOUS at 12:45

## 2019-07-01 RX ADMIN — DEXAMETHASONE SODIUM PHOSPHATE 12 MG: 4 INJECTION, SOLUTION INTRAMUSCULAR; INTRAVENOUS at 12:47

## 2019-07-01 RX ADMIN — LEUCOVORIN CALCIUM 490 MG: 350 INJECTION, POWDER, LYOPHILIZED, FOR SOLUTION INTRAMUSCULAR; INTRAVENOUS at 14:16

## 2019-07-01 RX ADMIN — SODIUM CHLORIDE 250 ML: 9 INJECTION, SOLUTION INTRAVENOUS at 12:43

## 2019-07-01 RX ADMIN — DEXTROSE MONOHYDRATE 300 MG: 50 INJECTION, SOLUTION INTRAVENOUS at 14:17

## 2019-07-01 NOTE — PROGRESS NOTES
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     HISTORY OF PRESENT ILLNESS: The patient is a very pleasant 79 y.o. female  with past medical history significant for colon cancer diagnosed 07/04/18. The patient presented to [facility] for gastritis and right lower quadrant pain.  This has been going on for the last few months associated with nausea but no vomiting.  She's been having one of dark stools.  Never had this problem before.  She had previous negative colonoscopy.  Colon polyp was found and biopsied. Surgical pathology 07/04/18 revealed invasive poorly differentiated adenocarcinoma in ascending colon. CT abdomen and pelvis 07/06/18 showed mass within the ascending colon with large mercy mass in the adjacent mesentery and prominent nodes around the cecum. The patient presented to Dr Juarez for elective right hemicolectomy on 08/01/18. Pathology 08/01/18 from colon and terminal ileum resection revealed poorly differentiated colonic adenocarcinoma ([id number]) with metastatic adenocarcinoma to 1 of 25 lymph nodes.  She was started on adjuvant chemotherapy using oxaliplatin with Xeloda September 25, 2018.  The patient completed 5 cycles and then decide to stop secondary to multiple side effects.  Repeated scans done on March 16, 2019 revealed new liver metastases.  She was started on palliative chemotherapy with FOLFIRI plus Avastin March 25, 2019.  The patient is here today for follow up visit was cycle #8.        SUBJECTIVE: The patient is here today with her niece.  She is doing fairly well.  She still complains of mouth and throat pain that lasts for 10 days after the infusion.  Antibiotic did not help.  Magic mouthwash helps a little bit.    PAST MEDICAL HISTORY/SOCIAL HISTORY/FAMILY HISTORY: Reviewed by me and unchanged from my documentation done on 09/17/18.    Review of Systems   Constitutional:  Positive for fatigue. Negative for activity change, appetite change, chills, fever and unexpected weight change.   HENT: Positive for congestion, postnasal drip and sore throat. Negative for hearing loss, mouth sores, nosebleeds and trouble swallowing.    Eyes: Negative for visual disturbance.   Respiratory: Negative for cough, chest tightness, shortness of breath and wheezing.    Cardiovascular: Negative for chest pain, palpitations and leg swelling.        Elevated blood pressure   Gastrointestinal: Positive for nausea. Negative for abdominal distention, abdominal pain, blood in stool, constipation, diarrhea, rectal pain and vomiting.   Endocrine: Negative for cold intolerance and heat intolerance.   Genitourinary: Negative for difficulty urinating, dysuria, frequency and urgency.   Musculoskeletal: Negative for arthralgias, back pain, gait problem, joint swelling and myalgias.   Skin: Negative for rash.   Neurological: Negative for dizziness, tremors, syncope, weakness, light-headedness, numbness and headaches.   Hematological: Negative for adenopathy. Does not bruise/bleed easily.   Psychiatric/Behavioral: Negative for confusion, sleep disturbance and suicidal ideas. The patient is not nervous/anxious.          Current Outpatient Medications:   •  atenolol (TENORMIN) 50 MG tablet, Take 50 mg by mouth Daily., Disp: , Rfl:   •  CloNIDine (CATAPRES) 0.1 MG tablet, , Disp: , Rfl:   •  lidocaine-prilocaine (EMLA) 2.5-2.5 % cream, Apply  topically to the appropriate area as directed As Needed (45-60 minutes prior to port access.  Cover with saran/plastic wrap.)., Disp: 30 g, Rfl: 3  •  megestrol (MEGACE) 40 MG/ML suspension, Take 20 mL by mouth Daily., Disp: 480 mL, Rfl: 5  •  nystatin susp + lidocaine viscous (MAGIC MOUTHWASH) oral suspension, 5-10 ml swish and spit or swallow QID prn, Disp: 240 mL, Rfl: 3  •  ondansetron (ZOFRAN) 8 MG tablet, Take 1 tablet by mouth Every 8 (Eight) Hours As Needed for Nausea., Disp:  "60 tablet, Rfl: 5  •  raNITIdine (ZANTAC) 150 MG tablet, Take 150 mg by mouth As Needed for Heartburn., Disp: , Rfl:     PHYSICAL EXAMINATION:   BP (!) 183/83   Pulse 70   Temp 97.4 °F (36.3 °C) (Temporal)   Resp 12   Ht 160 cm (63\")   Wt 68.5 kg (151 lb)   SpO2 98%   BMI 26.75 kg/m²    ECOG Performance Status: 1 - Symptomatic but completely ambulatory  General Appearance:  alert, cooperative, no apparent distress and appears stated age   Neurologic/Psychiatric: A&O x 3, gait steady, appropriate affect, strength 5/5 in all muscle groups   HEENT:  Normocephalic, without obvious abnormality, mucous membranes moist, mild erythema noted to throat with tender cervical adenopathy on the left, no coating to tongue, no ulceration, mild erythema noted to oropharynx.    Neck: Supple, symmetrical, trachea midline, no adenopathy;  No thyromegaly, masses, or tenderness   Lungs:   Clear to auscultation bilaterally; respirations regular, even, and unlabored bilaterally   Heart:  Regular rate and rhythm, no murmurs appreciated   Abdomen:   Soft, non-tender, non-distended and no organomegaly   Lymph nodes: No cervical, supraclavicular, inguinal or axillary adenopathy noted   Extremities: Normal, atraumatic; no clubbing, cyanosis, or edema    Skin: No rashes, ulcers, or suspicious lesions noted     No visits with results within 2 Week(s) from this visit.   Latest known visit with results is:   Hospital Outpatient Visit on 06/17/2019   Component Date Value Ref Range Status   • CEA 06/17/2019 13.70  ng/mL Final   • Glucose 06/17/2019 86  65 - 99 mg/dL Final   • BUN 06/17/2019 13  8 - 23 mg/dL Final   • Creatinine 06/17/2019 0.95  0.57 - 1.00 mg/dL Final   • Sodium 06/17/2019 136  136 - 145 mmol/L Final   • Potassium 06/17/2019 4.3  3.5 - 5.2 mmol/L Final   • Chloride 06/17/2019 103  98 - 107 mmol/L Final   • CO2 06/17/2019 23.0  22.0 - 29.0 mmol/L Final   • Calcium 06/17/2019 8.7  8.6 - 10.5 mg/dL Final   • Total Protein " 06/17/2019 7.1  6.0 - 8.5 g/dL Final   • Albumin 06/17/2019 3.80  3.50 - 5.20 g/dL Final   • ALT (SGPT) 06/17/2019 10  1 - 33 U/L Final   • AST (SGOT) 06/17/2019 17  1 - 32 U/L Final   • Alkaline Phosphatase 06/17/2019 106  39 - 117 U/L Final   • Total Bilirubin 06/17/2019 0.2  0.2 - 1.2 mg/dL Final   • eGFR Non African Amer 06/17/2019 57* >60 mL/min/1.73 Final   • Globulin 06/17/2019 3.3  gm/dL Final   • A/G Ratio 06/17/2019 1.2  g/dL Final   • BUN/Creatinine Ratio 06/17/2019 13.7  7.0 - 25.0 Final   • Anion Gap 06/17/2019 10.0  mmol/L Final   • Color, UA 06/17/2019 Yellow  Yellow, Straw Final   • Appearance, UA 06/17/2019 Clear  Clear Final   • pH, UA 06/17/2019 6.0  5.0 - 8.0 Final   • Specific Gravity, UA 06/17/2019 1.015  1.005 - 1.030 Final   • Glucose, UA 06/17/2019 Negative  Negative Final   • Ketones, UA 06/17/2019 Negative  Negative Final   • Bilirubin, UA 06/17/2019 Negative  Negative Final   • Blood, UA 06/17/2019 Trace* Negative Final   • Protein, UA 06/17/2019 Negative  Negative Final   • Leuk Esterase, UA 06/17/2019 Negative  Negative Final   • Nitrite, UA 06/17/2019 Negative  Negative Final   • Urobilinogen, UA 06/17/2019 0.2 E.U./dL  0.2 - 1.0 E.U./dL Final   • WBC 06/17/2019 13.30* 3.40 - 10.80 10*3/mm3 Final   • RBC 06/17/2019 3.68* 3.77 - 5.28 10*6/mm3 Final   • Hemoglobin 06/17/2019 11.1* 12.0 - 15.9 g/dL Final   • Hematocrit 06/17/2019 33.5* 34.0 - 46.6 % Final   • RDW 06/17/2019 19.9* 12.3 - 15.4 % Final   • MCV 06/17/2019 91.0  79.0 - 97.0 fL Final   • MCH 06/17/2019 30.1  26.6 - 33.0 pg Final   • MCHC 06/17/2019 33.1  31.5 - 35.7 g/dL Final   • MPV 06/17/2019 7.0  6.0 - 12.0 fL Final   • Platelets 06/17/2019 231  140 - 450 10*3/mm3 Final   • Neutrophil % 06/17/2019 29.0* 42.7 - 76.0 % Final   • Lymphocyte % 06/17/2019 62.2* 19.6 - 45.3 % Final   • Monocyte % 06/17/2019 8.8  5.0 - 12.0 % Final   • Neutrophils, Absolute 06/17/2019 3.90  1.70 - 7.00 10*3/mm3 Final   • Lymphocytes, Absolute  06/17/2019 8.30* 0.70 - 3.10 10*3/mm3 Final   • Monocytes, Absolute 06/17/2019 1.20* 0.10 - 0.90 10*3/mm3 Final        No results found.    ASSESSMENT: The patient is a very pleasant 79 y.o. 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  Ascending colon adenocarcinoma T3 N1 M0 stage IIIa:  A.  Presenting with abdominal pain and irregular bowel movements  B.  Diagnosed after colonoscopy with a biopsy done on June 27, 2018  C.  Status post right hemicolectomy done by Dr. Wong August 01, 2018  D.  Final pathology revealed with 1 out of 25 positive mesenteric lymph nodes  E. Started adjuvant chemotherapy using Xeloda with oxaliplatin September 25, 2018, status post 5 cycles  F. CEA 17.8 March 25, 2019  G.  Progressive disease with diffuse liver metastases dominant CAT scan done on March 2019  H. Started on palliative chemotherapy with FOLFIRI plus Avastin March 25, 2019, status post 7 cycles.  I.  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  2.  Hypertension  3.  Normocytic anemia  4.  Chemotherapy-induced mucositis  5.  Chemotherapy-induced nausea     PLAN:   1. I will proceed with palliative chemotherapy with FOLFIRI plus Avastin as scheduled today cycle #8.  2.   I will continue to monitor the patient's blood work including blood counts, kidney function, liver functions, and electrolytes.  We will also check CEA on day 1 of each cycle. I told her that it has continued to decrease with each treatment.   3.  I will continue routine port care with each cycle of chemotherapy. She has EMLA cream to use prior to port access.   4.  The patient had microsatellite instability identified on gene analysis. We will consider immunotherapy in the future if needed for progression on current second  line therapy.   5. The patient and her family have been made aware of the possibility of Benton syndrome. She was not interested in referral to genetics for testing and counseling at this time.   6.  We discussed potential side effects of chemotherapy including diarrhea, colitis, low blood counts, infusion reactions, dehydration, infection, and potential death.  7.  She will continue Norco 10/325 mg every 6 hours as needed for cancer related pain.  8.  She will continue  Zofran as needed for chemotherapy induced-nausea.  9.  She will follow-up with me in 2 weeks for cycle #9.  10.  We will repeat CAT scans in prior to cycle # 12, which will be 3 month follow up.  11.  We will continue atenolol 50 mg daily as well as clonidine 0.1 mg twice a day for hypertension. I gave her a refill on clonidine today. I asked her to monitor her blood pressure at home. We will adjust her regimen if needed for ongoing hypertension.   12.  I will reduce the dose of 5-FU and leucovorin by 25% secondary to mucositis.     Schuyler Coleman MD  7/1/2019

## 2019-07-01 NOTE — TELEPHONE ENCOUNTER
----- Message from Brissa Eastman RN sent at 7/1/2019 12:03 PM EDT -----  Regarding: BADIN - Getting Avastin b/p = 155/66 ok to treat?  BADIN - Getting Avastin b/p = 155/66 ok to treat?    Thanks Rachel Mcfarland [id number]

## 2019-07-15 ENCOUNTER — HOSPITAL ENCOUNTER (OUTPATIENT)
Dept: ONCOLOGY | Facility: HOSPITAL | Age: 79
Setting detail: INFUSION SERIES
Discharge: HOME | End: 2019-07-15

## 2019-07-15 ENCOUNTER — OFFICE VISIT (OUTPATIENT)
Dept: ONCOLOGY | Facility: CLINIC | Age: 79
End: 2019-07-15

## 2019-07-15 VITALS
BODY MASS INDEX: 27.64 KG/M2 | HEART RATE: 64 BPM | WEIGHT: 156 LBS | HEIGHT: 63 IN | DIASTOLIC BLOOD PRESSURE: 91 MMHG | SYSTOLIC BLOOD PRESSURE: 177 MMHG | RESPIRATION RATE: 16 BRPM | TEMPERATURE: 97 F

## 2019-07-15 DIAGNOSIS — C18.9 MALIGNANT NEOPLASM OF COLON, UNSPECIFIED PART OF COLON (HCC): ICD-10-CM

## 2019-07-15 DIAGNOSIS — C18.9 MALIGNANT NEOPLASM OF COLON, UNSPECIFIED PART OF COLON (HCC): Primary | ICD-10-CM

## 2019-07-15 PROCEDURE — G0463 HOSPITAL OUTPT CLINIC VISIT: HCPCS

## 2019-07-15 PROCEDURE — 99214 OFFICE O/P EST MOD 30 MIN: CPT | Performed by: NURSE PRACTITIONER

## 2019-07-15 RX ORDER — CEPHALEXIN 500 MG/1
500 CAPSULE ORAL 3 TIMES DAILY
Qty: 21 CAPSULE | Refills: 0 | Status: SHIPPED | OUTPATIENT
Start: 2019-07-15 | End: 2019-09-10

## 2019-07-15 RX ORDER — METHYLPREDNISOLONE 4 MG/1
TABLET ORAL
Qty: 21 TABLET | Refills: 0 | Status: SHIPPED | OUTPATIENT
Start: 2019-07-15 | End: 2019-10-22

## 2019-07-15 RX ORDER — HYDROCODONE BITARTRATE AND ACETAMINOPHEN 7.5; 325 MG/1; MG/1
1 TABLET ORAL EVERY 6 HOURS PRN
Qty: 90 TABLET | Refills: 0 | Status: SHIPPED | OUTPATIENT
Start: 2019-07-15 | End: 2019-08-29 | Stop reason: SDUPTHER

## 2019-07-19 ENCOUNTER — HOSPITAL ENCOUNTER (OUTPATIENT)
Facility: HOSPITAL | Age: 79
Setting detail: HOSPITAL OUTPATIENT SURGERY
Discharge: HOME | End: 2019-07-19
Attending: SURGERY | Admitting: SURGERY

## 2019-07-19 VITALS
OXYGEN SATURATION: 95 % | HEIGHT: 63 IN | WEIGHT: 156 LBS | RESPIRATION RATE: 16 BRPM | SYSTOLIC BLOOD PRESSURE: 179 MMHG | TEMPERATURE: 97.9 F | DIASTOLIC BLOOD PRESSURE: 85 MMHG | HEART RATE: 68 BPM | BODY MASS INDEX: 27.64 KG/M2

## 2019-07-19 DIAGNOSIS — T80.212A PORT OR RESERVOIR INFECTION: ICD-10-CM

## 2019-07-19 LAB
DEPRECATED RDW RBC AUTO: 59.6 FL (ref 37–54)
ERYTHROCYTE [DISTWIDTH] IN BLOOD BY AUTOMATED COUNT: 17.1 % (ref 12.3–15.4)
HCT VFR BLD AUTO: 37.7 % (ref 34–46.6)
HGB BLD-MCNC: 12 G/DL (ref 12–15.9)
MCH RBC QN AUTO: 30.4 PG (ref 26.6–33)
MCHC RBC AUTO-ENTMCNC: 31.8 G/DL (ref 31.5–35.7)
MCV RBC AUTO: 95.4 FL (ref 79–97)
PLATELET # BLD AUTO: 265 10*3/MM3 (ref 140–450)
PMV BLD AUTO: 9.6 FL (ref 6–12)
POTASSIUM BLD-SCNC: 4.1 MMOL/L (ref 3.5–5.2)
RBC # BLD AUTO: 3.95 10*6/MM3 (ref 3.77–5.28)
WBC NRBC COR # BLD: 22.36 10*3/MM3 (ref 3.4–10.8)

## 2019-07-19 PROCEDURE — 87147 CULTURE TYPE IMMUNOLOGIC: CPT | Performed by: SURGERY

## 2019-07-19 PROCEDURE — 84132 ASSAY OF SERUM POTASSIUM: CPT | Performed by: ANESTHESIOLOGY

## 2019-07-19 PROCEDURE — 87186 SC STD MICRODIL/AGAR DIL: CPT | Performed by: SURGERY

## 2019-07-19 PROCEDURE — 87075 CULTR BACTERIA EXCEPT BLOOD: CPT | Performed by: SURGERY

## 2019-07-19 PROCEDURE — 87205 SMEAR GRAM STAIN: CPT | Performed by: SURGERY

## 2019-07-19 PROCEDURE — 25010000003 CEFAZOLIN IN DEXTROSE 2-4 GM/100ML-% SOLUTION: Performed by: SURGERY

## 2019-07-19 PROCEDURE — 25010000003 LIDOCAINE 1 % SOLUTION: Performed by: SURGERY

## 2019-07-19 PROCEDURE — 85027 COMPLETE CBC AUTOMATED: CPT | Performed by: ANESTHESIOLOGY

## 2019-07-19 PROCEDURE — 87070 CULTURE OTHR SPECIMN AEROBIC: CPT | Performed by: SURGERY

## 2019-07-19 RX ORDER — SODIUM CHLORIDE 0.9 % (FLUSH) 0.9 %
3-10 SYRINGE (ML) INJECTION AS NEEDED
Status: DISCONTINUED | OUTPATIENT
Start: 2019-07-19 | End: 2019-07-19 | Stop reason: HOSPADM

## 2019-07-19 RX ORDER — BUPIVACAINE HYDROCHLORIDE AND EPINEPHRINE 2.5; 5 MG/ML; UG/ML
INJECTION, SOLUTION EPIDURAL; INFILTRATION; INTRACAUDAL; PERINEURAL AS NEEDED
Status: DISCONTINUED | OUTPATIENT
Start: 2019-07-19 | End: 2019-07-19 | Stop reason: HOSPADM

## 2019-07-19 RX ORDER — FAMOTIDINE 20 MG/1
20 TABLET, FILM COATED ORAL ONCE
Status: COMPLETED | OUTPATIENT
Start: 2019-07-19 | End: 2019-07-19

## 2019-07-19 RX ORDER — CEFAZOLIN SODIUM 2 G/100ML
2 INJECTION, SOLUTION INTRAVENOUS ONCE
Status: COMPLETED | OUTPATIENT
Start: 2019-07-19 | End: 2019-07-19

## 2019-07-19 RX ORDER — SODIUM CHLORIDE, SODIUM LACTATE, POTASSIUM CHLORIDE, CALCIUM CHLORIDE 600; 310; 30; 20 MG/100ML; MG/100ML; MG/100ML; MG/100ML
9 INJECTION, SOLUTION INTRAVENOUS CONTINUOUS
Status: DISCONTINUED | OUTPATIENT
Start: 2019-07-19 | End: 2019-07-19 | Stop reason: HOSPADM

## 2019-07-19 RX ORDER — SODIUM CHLORIDE 0.9 % (FLUSH) 0.9 %
3 SYRINGE (ML) INJECTION EVERY 12 HOURS SCHEDULED
Status: DISCONTINUED | OUTPATIENT
Start: 2019-07-19 | End: 2019-07-19 | Stop reason: HOSPADM

## 2019-07-19 RX ORDER — LIDOCAINE HYDROCHLORIDE 10 MG/ML
INJECTION, SOLUTION INFILTRATION; PERINEURAL AS NEEDED
Status: DISCONTINUED | OUTPATIENT
Start: 2019-07-19 | End: 2019-07-19 | Stop reason: HOSPADM

## 2019-07-19 RX ORDER — FAMOTIDINE 10 MG/ML
20 INJECTION, SOLUTION INTRAVENOUS ONCE
Status: CANCELLED | OUTPATIENT
Start: 2019-07-19 | End: 2019-07-19

## 2019-07-19 RX ORDER — LIDOCAINE HYDROCHLORIDE 10 MG/ML
0.5 INJECTION, SOLUTION EPIDURAL; INFILTRATION; INTRACAUDAL; PERINEURAL ONCE AS NEEDED
Status: COMPLETED | OUTPATIENT
Start: 2019-07-19 | End: 2019-07-19

## 2019-07-19 RX ORDER — MAGNESIUM HYDROXIDE 1200 MG/15ML
LIQUID ORAL AS NEEDED
Status: DISCONTINUED | OUTPATIENT
Start: 2019-07-19 | End: 2019-07-19 | Stop reason: HOSPADM

## 2019-07-19 RX ADMIN — CEFAZOLIN SODIUM 2 G: 2 INJECTION, SOLUTION INTRAVENOUS at 12:48

## 2019-07-19 RX ADMIN — SODIUM CHLORIDE, POTASSIUM CHLORIDE, SODIUM LACTATE AND CALCIUM CHLORIDE 9 ML/HR: 600; 310; 30; 20 INJECTION, SOLUTION INTRAVENOUS at 11:53

## 2019-07-19 RX ADMIN — FAMOTIDINE 20 MG: 20 TABLET ORAL at 11:53

## 2019-07-19 RX ADMIN — LIDOCAINE HYDROCHLORIDE 0.2 ML: 10 INJECTION, SOLUTION EPIDURAL; INFILTRATION; INTRACAUDAL; PERINEURAL at 11:53

## 2019-07-19 NOTE — OP NOTE
Operative Note      Date of Surgery:  7/19/2019     Pre-Operative Diagnosis: Infected Port      Post-Operative Diagnosis: Same     Procedure:  Removal of right internal jugular vein port     Anesthesia:  Local     Surgeon:  Rosalinda Atkinson MD     Assistant:  None     Estimated Blood Loss:  Minimal     Indication for Procedure:   Mrs. Pedraza is an 79-year-old female with a history of metastatic colon cancer, currently on palliative chemotherapy, who is being brought to the operating room today for port removal due to a port infection.  The port was placed in April, and the patient has been receiving chemotherapy uneventfully since that time.  Approximately one week ago, the patient noticed erythema overlying the port that progressed to skin dehiscence and purulent drainage.  She has been on antibiotics, but is in need of port removal.  I counseled the patient as to the benefits and risks of the procedure, including but not limited to: Bleeding, infection, need for additional procedures or surgeries, need for replacement of the port, and misplaced port contents.  The patient understands these risks and wishes to proceed.     Procedure:   The patient was brought to the operating room after informed consent was obtained.  She was placed in the supine position with the right arm extended.  The right chest was prepped and draped in a standard sterile fashion.  A timeout was performed, indicating the patient's name and intended procedure.  Local anesthetic was injected over the port of site, and a skin incision was made overlying the device.  The skin and subcutaneous tissues were dissected down to the port using electrocautery.  The port was dissected away from the surrounding fibrous tissues to identify the Prolene sutures that were cut.  The port was then removed with ease.  Inspection of the port revealed it to be intact.  The operative bed was irrigated and dried.  The incision was closed with 3-0 Vicryl deep dermal  sutures followed by 4-0 Monocryl in a subcuticular fashion.  The incision was cleansed and dried and covered with Mastisol, Steri-Strips, Telfa, Tegaderm.  Patient was transported to the recovery unit in stable condition.  All lap, sponge, needle counts were noted to be corrected of the case.

## 2019-07-19 NOTE — H&P
History and Physical    Shar Quintanilla MD    Su Pedraza  [id number]  1940    Date of Service: 7.19.2019    History of Present Illness: Ms. Pedraza is an 79 year old female with a history of Stage IV colon adenocarcinoma, currently on palliative chemotherapy, who presents for port removal. The patient has developed an infection of her port for which she is currently on antibiotics.       Past Medical History:   Diagnosis Date   • Arthritis     hands and legs    • Cancer (CMS/HCC)     colon cancer, uterine cancer   • Colon cancer (CMS/HCC)    • Full dentures    • GERD (gastroesophageal reflux disease)    • Hypertension    • Liver metastases (CMS/HCC)        Allergies   Allergen Reactions   • Adhesive Tape Rash   • Codeine Sulfate Nausea And Vomiting       No current facility-administered medications on file prior to encounter.      Current Outpatient Medications on File Prior to Encounter   Medication Sig Dispense Refill   • atenolol (TENORMIN) 50 MG tablet Take 50 mg by mouth Daily.     • cephalexin (KEFLEX) 500 MG capsule Take 1 capsule by mouth 3 (Three) Times a Day. 21 capsule 0   • CloNIDine (CATAPRES) 0.1 MG tablet      • HYDROcodone-acetaminophen (NORCO) 7.5-325 MG per tablet Take 1 tablet by mouth Every 6 (Six) Hours As Needed for Moderate Pain . 90 tablet 0   • megestrol (MEGACE) 40 MG/ML suspension Take 20 mL by mouth Daily. 480 mL 5   • methylPREDNISolone (MEDROL, ROSEMARY,) 4 MG tablet Take as directed on package instructions. 21 tablet 0   • nystatin susp + lidocaine viscous (MAGIC MOUTHWASH) oral suspension 5-10 ml swish and spit or swallow QID prn 240 mL 3   • ondansetron (ZOFRAN) 8 MG tablet Take 1 tablet by mouth Every 8 (Eight) Hours As Needed for Nausea. 60 tablet 5   • raNITIdine (ZANTAC) 150 MG tablet Take 150 mg by mouth As Needed for Heartburn.     • lidocaine-prilocaine (EMLA) 2.5-2.5 % cream Apply  topically to the appropriate area as directed As Needed (45-60 minutes prior to port  "access.  Cover with saran/plastic wrap.). 30 g 3         Current Facility-Administered Medications:   •  ceFAZolin in dextrose (ANCEF) IVPB solution 2 g, 2 g, Intravenous, Once, Rosalinda Atkinson MD  •  lactated ringers infusion, 9 mL/hr, Intravenous, Continuous, Mk Campos MD, Last Rate: 9 mL/hr at 07/19/19 1153, 9 mL/hr at 07/19/19 1153  •  sodium chloride 0.9 % flush 3 mL, 3 mL, Intravenous, Q12H, Mk Campos MD  •  sodium chloride 0.9 % flush 3-10 mL, 3-10 mL, Intravenous, PRN, Mk Campos MD        History reviewed. No pertinent family history.  Social History     Socioeconomic History   • Marital status:      Spouse name: Not on file   • Number of children: Not on file   • Years of education: Not on file   • Highest education level: Not on file   Tobacco Use   • Smoking status: Never Smoker   • Smokeless tobacco: Never Used   Substance and Sexual Activity   • Alcohol use: No   • Drug use: No   • Sexual activity: Defer       Review of Systems:  Review of Systems   Constitutional: Positive for chills and fever.   HENT: Negative.    Eyes: Negative.    Respiratory: Negative.    Cardiovascular: Negative.    Gastrointestinal: Negative.    Endocrine: Negative.    Genitourinary: Negative.    Musculoskeletal: Negative.    Skin: Negative.    Allergic/Immunologic: Negative.    Neurological: Negative.    Hematological: Negative.    Psychiatric/Behavioral: Negative.        BP (!) 190/95 (BP Location: Right arm, Patient Position: Lying)   Pulse 58   Temp 97.9 °F (36.6 °C) (Temporal)   Resp 16   Ht 160 cm (62.99\")   Wt 70.8 kg (156 lb)   SpO2 96%   BMI 27.64 kg/m²   Body mass index is 27.64 kg/m².    Physical Exam:  General: NAD, AAO x 3   HEENT: PER, no icterus, normal sclera.  Mucous membranes moist.  Neck supple without adenopathy.  Cardiac: Regular rate and rhythm without murmurs, rubs, or gallops.  Pulmonary: Clear to auscultation bilaterally without rales, rhonchi, or wheezes.  Abdominal: " Soft, NT, ND.  Neurologic: CN II - XII grossly intact.  No focal neuro deficits.  Extremities: Warm and well perfused.  No edema.  Skin: No obvious rashes or worrisome lesions noted.    CBC  Results from last 7 days   Lab Units 07/19/19  1135   WBC 10*3/mm3 22.36*   HEMOGLOBIN g/dL 12.0   HEMATOCRIT % 37.7   PLATELETS 10*3/mm3 265       CMP  Results from last 7 days   Lab Units 07/19/19  1135   POTASSIUM mmol/L 4.1       Radiology  Imaging Results (last 72 hours)     ** No results found for the last 72 hours. **          Assessment  Ms. Pedraza is an 79 year old female with a port site infection who presents for port removal.    Plan:  - Port removal today      Rosalinda Atkinson MD  07/19/19  12:42 PM

## 2019-07-19 NOTE — DISCHARGE INSTRUCTIONS
1.  You may remove your bandages in 48 hours.  Use antibiotic ointment provided for you for three days after surgery.  2.  You may shower in 48 hours.  Gently rinse your incisions with soapy water.  Do not immerse your incisions in water (i.e...tub baths, hot tubs, etc...) until cleared by a physician.  3.  You may supplement your narcotic pain medications with Ibuprofen.  Take a stool softener when taking narcotic pain medications.  4.  Do not drive while on narcotic pain medications.  5.  No heavy lifting greater than 10 pounds for 1 week after your surgery.  No strenuous activity (i.e... pushing, pulling, etc...) for 1 weeks after your surgery.  6.  Call the office for any fevers greater than 101.5, nausea, vomiting, pain not relieved by pain medications, redness/swelling/drainage from your incisions, or any other concerning signs or symptoms.  7.  Follow up with Dr. Atkinson at [facility] in 10 days (ph: [phone]).

## 2019-07-21 LAB
BACTERIA SPEC AEROBE CULT: ABNORMAL
GRAM STN SPEC: ABNORMAL
GRAM STN SPEC: ABNORMAL

## 2019-07-22 ENCOUNTER — TELEPHONE (OUTPATIENT)
Dept: ONCOLOGY | Facility: CLINIC | Age: 79
End: 2019-07-22

## 2019-07-22 ENCOUNTER — DOCUMENTATION (OUTPATIENT)
Dept: SURGERY | Facility: HOSPITAL | Age: 79
End: 2019-07-22

## 2019-07-22 NOTE — TELEPHONE ENCOUNTER
Received voicemail from patient friend on triage line. Pt had port removed last Friday. Completed keflex on Saturday. Today, she is still showing s/s infection: chills, low grade fever, and lethargy. Discussed with MARVIN Gaines. Returned call and spoke with pt. Recommended she call her surgeon who placed/ removed port, Madelyn Atkinson, and let her know. Explained it is important that she do this today. Pt v/u.

## 2019-07-23 ENCOUNTER — TELEPHONE (OUTPATIENT)
Dept: ONCOLOGY | Facility: CLINIC | Age: 79
End: 2019-07-23

## 2019-07-23 NOTE — TELEPHONE ENCOUNTER
----- Message from Christiane Chapin sent at 7/23/2019 12:52 PM EDT -----  Regarding: BADIN - INFECTION / ANTIBIOTICS   Contact: [phone]  ERICA WITH [facility] CALLED REGARDING PATIENT. SHE IS WANTING TO DISCUSS UPDATE ON PATIENTS INFECTION, AND SHE IS ON HER 2ND ROUND OF ANTIBIOTICS.     CALL ERICA BACK

## 2019-07-23 NOTE — TELEPHONE ENCOUNTER
Returned call to [facility] RN and reported that patient/or friend called triage line yesterday and triage nurse documented that she discussed patients 2 nd round of ABX and that Liana WONG advised patient to contact surgeon Rosalinda Atkinson related to infection.  Home health nurse to call Surgeon that removed port last week related to infection around surgical incision.  Also discussed that if there are any questions or concerns that patient/family can discuss at follow up with Dr. Coleman on Monday.

## 2019-07-24 ENCOUNTER — TRANSCRIBE ORDERS (OUTPATIENT)
Dept: LAB | Facility: HOSPITAL | Age: 79
End: 2019-07-24

## 2019-07-24 ENCOUNTER — APPOINTMENT (OUTPATIENT)
Dept: LAB | Facility: HOSPITAL | Age: 79
End: 2019-07-24

## 2019-07-24 ENCOUNTER — TELEPHONE (OUTPATIENT)
Dept: ONCOLOGY | Facility: CLINIC | Age: 79
End: 2019-07-24

## 2019-07-24 DIAGNOSIS — C18.9 MALIGNANT NEOPLASM OF COLON, UNSPECIFIED PART OF COLON (HCC): Primary | ICD-10-CM

## 2019-07-24 DIAGNOSIS — T80.219A CENTRAL VENOUS LINE INFECTION, INITIAL ENCOUNTER: Primary | ICD-10-CM

## 2019-07-24 LAB
BACTERIA SPEC ANAEROBE CULT: NORMAL
DEPRECATED RDW RBC AUTO: 61.1 FL (ref 37–54)
ERYTHROCYTE [DISTWIDTH] IN BLOOD BY AUTOMATED COUNT: 17.1 % (ref 12.3–15.4)
HCT VFR BLD AUTO: 39.6 % (ref 34–46.6)
HGB BLD-MCNC: 12.4 G/DL (ref 12–15.9)
MCH RBC QN AUTO: 30.2 PG (ref 26.6–33)
MCHC RBC AUTO-ENTMCNC: 31.3 G/DL (ref 31.5–35.7)
MCV RBC AUTO: 96.6 FL (ref 79–97)
PLATELET # BLD AUTO: 229 10*3/MM3 (ref 140–450)
PMV BLD AUTO: 10.2 FL (ref 6–12)
RBC # BLD AUTO: 4.1 10*6/MM3 (ref 3.77–5.28)
WBC NRBC COR # BLD: 20.24 10*3/MM3 (ref 3.4–10.8)

## 2019-07-24 PROCEDURE — 36415 COLL VENOUS BLD VENIPUNCTURE: CPT | Performed by: SURGERY

## 2019-07-24 PROCEDURE — 85027 COMPLETE CBC AUTOMATED: CPT | Performed by: SURGERY

## 2019-07-24 NOTE — TELEPHONE ENCOUNTER
Returned call to [facility] that Dr. Coleman has ordered for patient to get a PICC line placed before next treatment, due to patient having port removed due to infection. Referral Coordinator will call with date and time for picc line placement.

## 2019-07-24 NOTE — TELEPHONE ENCOUNTER
----- Message from Hui Blackburns sent at 7/24/2019  2:37 PM EDT -----  Regarding: BANDAR-PORT  Contact: [phone]  Danielle patient's niece called and patient had to get the port removed due to an infection is on antibiotics until Monday. Patient is scheduled to see Dr. Coleman and get chemo Monday does this need to moved out? If not patient will have to have a Picc line put in for chemo. Please call.

## 2019-07-25 DIAGNOSIS — C18.9 MALIGNANT NEOPLASM OF COLON, UNSPECIFIED PART OF COLON (HCC): Primary | ICD-10-CM

## 2019-07-26 ENCOUNTER — TELEPHONE (OUTPATIENT)
Dept: INFUSION THERAPY | Facility: HOSPITAL | Age: 79
End: 2019-07-26

## 2019-07-26 ENCOUNTER — HOSPITAL ENCOUNTER (OUTPATIENT)
Dept: INFUSION THERAPY | Facility: HOSPITAL | Age: 79
Discharge: HOME | End: 2019-07-26
Admitting: INTERNAL MEDICINE

## 2019-07-26 VITALS
OXYGEN SATURATION: 98 % | HEIGHT: 63 IN | WEIGHT: 154.4 LBS | SYSTOLIC BLOOD PRESSURE: 151 MMHG | HEART RATE: 62 BPM | DIASTOLIC BLOOD PRESSURE: 75 MMHG | BODY MASS INDEX: 27.36 KG/M2 | TEMPERATURE: 98 F | RESPIRATION RATE: 18 BRPM

## 2019-07-26 DIAGNOSIS — C18.9 MALIGNANT NEOPLASM OF COLON, UNSPECIFIED PART OF COLON (HCC): Primary | ICD-10-CM

## 2019-07-26 PROCEDURE — C1751 CATH, INF, PER/CENT/MIDLINE: HCPCS

## 2019-07-26 PROCEDURE — C1894 INTRO/SHEATH, NON-LASER: HCPCS

## 2019-07-26 RX ORDER — SODIUM CHLORIDE 0.9 % (FLUSH) 0.9 %
10 SYRINGE (ML) INJECTION AS NEEDED
Status: DISCONTINUED | OUTPATIENT
Start: 2019-07-26 | End: 2019-07-28 | Stop reason: HOSPADM

## 2019-07-26 RX ORDER — SODIUM CHLORIDE 0.9 % (FLUSH) 0.9 %
10 SYRINGE (ML) INJECTION EVERY 12 HOURS SCHEDULED
Status: DISCONTINUED | OUTPATIENT
Start: 2019-07-26 | End: 2019-07-28 | Stop reason: HOSPADM

## 2019-07-26 NOTE — PROGRESS NOTES
PICC line placed in upper Lt arm. Dressing clean, dry, and intact with stockinet cover.   Discharged ambulatory with family.

## 2019-07-29 ENCOUNTER — HOSPITAL ENCOUNTER (OUTPATIENT)
Dept: ONCOLOGY | Facility: HOSPITAL | Age: 79
Setting detail: INFUSION SERIES
Discharge: HOME | End: 2019-07-29

## 2019-07-29 ENCOUNTER — OFFICE VISIT (OUTPATIENT)
Dept: ONCOLOGY | Facility: CLINIC | Age: 79
End: 2019-07-29

## 2019-07-29 ENCOUNTER — TELEPHONE (OUTPATIENT)
Dept: ONCOLOGY | Facility: CLINIC | Age: 79
End: 2019-07-29

## 2019-07-29 VITALS
TEMPERATURE: 97 F | HEART RATE: 64 BPM | DIASTOLIC BLOOD PRESSURE: 88 MMHG | SYSTOLIC BLOOD PRESSURE: 172 MMHG | RESPIRATION RATE: 16 BRPM | WEIGHT: 156 LBS | HEIGHT: 63 IN | BODY MASS INDEX: 27.64 KG/M2 | OXYGEN SATURATION: 98 %

## 2019-07-29 DIAGNOSIS — C18.9 MALIGNANT NEOPLASM OF COLON, UNSPECIFIED PART OF COLON (HCC): Primary | ICD-10-CM

## 2019-07-29 LAB
ALBUMIN SERPL-MCNC: 3.7 G/DL (ref 3.5–5.2)
ALBUMIN/GLOB SERPL: 1.1 G/DL
ALP SERPL-CCNC: 113 U/L (ref 39–117)
ALT SERPL W P-5'-P-CCNC: 13 U/L (ref 1–33)
ANION GAP SERPL CALCULATED.3IONS-SCNC: 13 MMOL/L (ref 5–15)
AST SERPL-CCNC: 19 U/L (ref 1–32)
BILIRUB SERPL-MCNC: 0.3 MG/DL (ref 0.2–1.2)
BILIRUB UR QL STRIP: NEGATIVE
BUN BLD-MCNC: 16 MG/DL (ref 8–23)
BUN/CREAT SERPL: 19.3 (ref 7–25)
CALCIUM SPEC-SCNC: 9 MG/DL (ref 8.6–10.5)
CEA SERPL-MCNC: 7.52 NG/ML
CHLORIDE SERPL-SCNC: 100 MMOL/L (ref 98–107)
CLARITY UR: CLEAR
CO2 SERPL-SCNC: 21 MMOL/L (ref 22–29)
COLOR UR: YELLOW
CREAT BLD-MCNC: 0.83 MG/DL (ref 0.57–1)
ERYTHROCYTE [DISTWIDTH] IN BLOOD BY AUTOMATED COUNT: 18.7 % (ref 12.3–15.4)
GFR SERPL CREATININE-BSD FRML MDRD: 66 ML/MIN/1.73
GLOBULIN UR ELPH-MCNC: 3.5 GM/DL
GLUCOSE BLD-MCNC: 68 MG/DL (ref 65–99)
GLUCOSE UR STRIP-MCNC: NEGATIVE MG/DL
HCT VFR BLD AUTO: 34.7 % (ref 34–46.6)
HGB BLD-MCNC: 11.8 G/DL (ref 12–15.9)
HGB UR QL STRIP.AUTO: ABNORMAL
KETONES UR QL STRIP: NEGATIVE
LEUKOCYTE ESTERASE UR QL STRIP.AUTO: NEGATIVE
LYMPHOCYTES # BLD AUTO: 10.7 10*3/MM3 (ref 0.7–3.1)
LYMPHOCYTES NFR BLD AUTO: 51 % (ref 19.6–45.3)
MCH RBC QN AUTO: 31.5 PG (ref 26.6–33)
MCHC RBC AUTO-ENTMCNC: 34 G/DL (ref 31.5–35.7)
MCV RBC AUTO: 92.7 FL (ref 79–97)
MONOCYTES # BLD AUTO: 1.9 10*3/MM3 (ref 0.1–0.9)
MONOCYTES NFR BLD AUTO: 9 % (ref 5–12)
NEUTROPHILS # BLD AUTO: 8.4 10*3/MM3 (ref 1.7–7)
NEUTROPHILS NFR BLD AUTO: 40 % (ref 42.7–76)
NITRITE UR QL STRIP: NEGATIVE
PH UR STRIP.AUTO: 6 [PH] (ref 5–8)
PLATELET # BLD AUTO: 250 10*3/MM3 (ref 140–450)
PMV BLD AUTO: 7.5 FL (ref 6–12)
POTASSIUM BLD-SCNC: 4.2 MMOL/L (ref 3.5–5.2)
PROT SERPL-MCNC: 7.2 G/DL (ref 6–8.5)
PROT UR QL STRIP: NEGATIVE
RBC # BLD AUTO: 3.74 10*6/MM3 (ref 3.77–5.28)
SODIUM BLD-SCNC: 134 MMOL/L (ref 136–145)
SP GR UR STRIP: 1.01 (ref 1–1.03)
UROBILINOGEN UR QL STRIP: ABNORMAL
WBC NRBC COR # BLD: 21 10*3/MM3 (ref 3.4–10.8)

## 2019-07-29 PROCEDURE — 96413 CHEMO IV INFUSION 1 HR: CPT

## 2019-07-29 PROCEDURE — 96415 CHEMO IV INFUSION ADDL HR: CPT

## 2019-07-29 PROCEDURE — 82378 CARCINOEMBRYONIC ANTIGEN: CPT | Performed by: INTERNAL MEDICINE

## 2019-07-29 PROCEDURE — 96368 THER/DIAG CONCURRENT INF: CPT

## 2019-07-29 PROCEDURE — 81003 URINALYSIS AUTO W/O SCOPE: CPT | Performed by: INTERNAL MEDICINE

## 2019-07-29 PROCEDURE — 96417 CHEMO IV INFUS EACH ADDL SEQ: CPT

## 2019-07-29 PROCEDURE — 99215 OFFICE O/P EST HI 40 MIN: CPT | Performed by: INTERNAL MEDICINE

## 2019-07-29 PROCEDURE — 25010000002 ATROPINE PER 0.01 MG: Performed by: INTERNAL MEDICINE

## 2019-07-29 PROCEDURE — 96411 CHEMO IV PUSH ADDL DRUG: CPT

## 2019-07-29 PROCEDURE — 85025 COMPLETE CBC W/AUTO DIFF WBC: CPT | Performed by: INTERNAL MEDICINE

## 2019-07-29 PROCEDURE — 25010000002 DEXAMETHASONE PER 1 MG: Performed by: INTERNAL MEDICINE

## 2019-07-29 PROCEDURE — 25010000002 PALONOSETRON 0.25 MG/5ML SOLUTION PREFILLED SYRINGE: Performed by: INTERNAL MEDICINE

## 2019-07-29 PROCEDURE — 25010000002 FLUOROURACIL PER 500 MG: Performed by: INTERNAL MEDICINE

## 2019-07-29 PROCEDURE — 96375 TX/PRO/DX INJ NEW DRUG ADDON: CPT

## 2019-07-29 PROCEDURE — 96416 CHEMO PROLONG INFUSE W/PUMP: CPT

## 2019-07-29 PROCEDURE — 25010000002 LEUCOVORIN CALCIUM PER 50 MG: Performed by: INTERNAL MEDICINE

## 2019-07-29 PROCEDURE — 80053 COMPREHEN METABOLIC PANEL: CPT | Performed by: INTERNAL MEDICINE

## 2019-07-29 PROCEDURE — 25010000002 IRINOTECAN PER 20 MG: Performed by: INTERNAL MEDICINE

## 2019-07-29 RX ORDER — ATROPINE SULFATE 1 MG/ML
0.25 INJECTION, SOLUTION INTRAMUSCULAR; INTRAVENOUS; SUBCUTANEOUS
Status: DISCONTINUED | OUTPATIENT
Start: 2019-07-29 | End: 2019-07-30 | Stop reason: HOSPADM

## 2019-07-29 RX ORDER — SODIUM CHLORIDE 9 MG/ML
250 INJECTION, SOLUTION INTRAVENOUS ONCE
Status: DISCONTINUED | OUTPATIENT
Start: 2019-07-29 | End: 2019-07-30 | Stop reason: HOSPADM

## 2019-07-29 RX ORDER — FLUOROURACIL 50 MG/ML
300 INJECTION, SOLUTION INTRAVENOUS ONCE
Status: COMPLETED | OUTPATIENT
Start: 2019-07-29 | End: 2019-07-29

## 2019-07-29 RX ORDER — PALONOSETRON 0.05 MG/ML
0.25 INJECTION, SOLUTION INTRAVENOUS ONCE
Status: COMPLETED | OUTPATIENT
Start: 2019-07-29 | End: 2019-07-29

## 2019-07-29 RX ADMIN — PALONOSETRON 0.25 MG: 0.25 INJECTION, SOLUTION INTRAVENOUS at 12:11

## 2019-07-29 RX ADMIN — FLUOROURACIL 490 MG: 50 INJECTION, SOLUTION INTRAVENOUS at 14:22

## 2019-07-29 RX ADMIN — ATROPINE SULFATE 0.25 MG: 1 INJECTION, SOLUTION INTRAMUSCULAR; INTRAVENOUS; SUBCUTANEOUS at 12:28

## 2019-07-29 RX ADMIN — LEUCOVORIN CALCIUM 490 MG: 100 INJECTION, POWDER, LYOPHILIZED, FOR SOLUTION INTRAMUSCULAR; INTRAVENOUS at 12:29

## 2019-07-29 RX ADMIN — FLUOROURACIL 3000 MG: 50 INJECTION, SOLUTION INTRAVENOUS at 14:28

## 2019-07-29 RX ADMIN — DEXAMETHASONE SODIUM PHOSPHATE 12 MG: 4 INJECTION, SOLUTION INTRAMUSCULAR; INTRAVENOUS at 12:13

## 2019-07-29 RX ADMIN — DEXTROSE MONOHYDRATE 300 MG: 50 INJECTION, SOLUTION INTRAVENOUS at 12:29

## 2019-07-29 NOTE — PROGRESS NOTES
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     HISTORY OF PRESENT ILLNESS: The patient is a very pleasant 79 y.o. female  with past medical history significant for colon cancer diagnosed 07/04/18. The patient presented to [facility] for gastritis and right lower quadrant pain.  This has been going on for the last few months associated with nausea but no vomiting.  She's been having one of dark stools.  Never had this problem before.  She had previous negative colonoscopy.  Colon polyp was found and biopsied. Surgical pathology 07/04/18 revealed invasive poorly differentiated adenocarcinoma in ascending colon. CT abdomen and pelvis 07/06/18 showed mass within the ascending colon with large mercy mass in the adjacent mesentery and prominent nodes around the cecum. The patient presented to Dr Juarez for elective right hemicolectomy on 08/01/18. Pathology 08/01/18 from colon and terminal ileum resection revealed poorly differentiated colonic adenocarcinoma ([id number]) with metastatic adenocarcinoma to 1 of 25 lymph nodes.  She was started on adjuvant chemotherapy using oxaliplatin with Xeloda September 25, 2018.  The patient completed 5 cycles and then decide to stop secondary to multiple side effects.  Repeated scans done on March 16, 2019 revealed new liver metastases.  She was started on palliative chemotherapy with FOLFIRI plus Avastin March 25, 2019.  The patient is here today for follow up visit was cycle #9.        SUBJECTIVE: The patient is here today with her niece.  All in all she has been doing fairly well.  She had her port removed and currently she has a new PICC line left arm.    PAST MEDICAL HISTORY/SOCIAL HISTORY/FAMILY HISTORY: Reviewed by me and unchanged from my documentation done on 09/17/18.    Review of Systems   Constitutional: Positive for fatigue. Negative for activity change, appetite  change, chills, fever and unexpected weight change.   HENT: Positive for congestion and sore throat. Negative for hearing loss, mouth sores, nosebleeds, postnasal drip and trouble swallowing.    Eyes: Negative for visual disturbance.   Respiratory: Negative for cough, chest tightness, shortness of breath and wheezing.    Cardiovascular: Negative for chest pain, palpitations and leg swelling.        Elevated blood pressure   Gastrointestinal: Negative for abdominal distention, abdominal pain, blood in stool, constipation, diarrhea, nausea, rectal pain and vomiting.   Endocrine: Negative for cold intolerance and heat intolerance.   Genitourinary: Negative for difficulty urinating, dysuria, frequency and urgency.   Musculoskeletal: Positive for gait problem. Negative for arthralgias, back pain, joint swelling and myalgias.        Right hip/buttocks pain   Skin: Negative for rash.        Redness to port site   Neurological: Negative for dizziness, tremors, syncope, weakness, light-headedness, numbness and headaches.   Hematological: Negative for adenopathy. Does not bruise/bleed easily.   Psychiatric/Behavioral: Negative for confusion, sleep disturbance and suicidal ideas. The patient is not nervous/anxious.          Current Outpatient Medications:   •  atenolol (TENORMIN) 50 MG tablet, Take 50 mg by mouth Daily., Disp: , Rfl:   •  cephalexin (KEFLEX) 500 MG capsule, Take 1 capsule by mouth 3 (Three) Times a Day., Disp: 21 capsule, Rfl: 0  •  CloNIDine (CATAPRES) 0.1 MG tablet, , Disp: , Rfl:   •  HYDROcodone-acetaminophen (NORCO) 7.5-325 MG per tablet, Take 1 tablet by mouth Every 6 (Six) Hours As Needed for Moderate Pain ., Disp: 90 tablet, Rfl: 0  •  lidocaine-prilocaine (EMLA) 2.5-2.5 % cream, Apply  topically to the appropriate area as directed As Needed (45-60 minutes prior to port access.  Cover with saran/plastic wrap.)., Disp: 30 g, Rfl: 3  •  megestrol (MEGACE) 40 MG/ML suspension, Take 20 mL by mouth Daily.,  "Disp: 480 mL, Rfl: 5  •  methylPREDNISolone (MEDROL, ROSEMARY,) 4 MG tablet, Take as directed on package instructions., Disp: 21 tablet, Rfl: 0  •  nystatin susp + lidocaine viscous (MAGIC MOUTHWASH) oral suspension, 5-10 ml swish and spit or swallow QID prn, Disp: 240 mL, Rfl: 3  •  ondansetron (ZOFRAN) 8 MG tablet, Take 1 tablet by mouth Every 8 (Eight) Hours As Needed for Nausea., Disp: 60 tablet, Rfl: 5  •  raNITIdine (ZANTAC) 150 MG tablet, Take 150 mg by mouth As Needed for Heartburn., Disp: , Rfl:     PHYSICAL EXAMINATION:   /88   Pulse 64   Temp 97 °F (36.1 °C) (Temporal)   Resp 16   Ht 160 cm (62.99\")   Wt 70.8 kg (156 lb)   SpO2 98%   BMI 27.64 kg/m²    ECOG Performance Status: 2 - Symptomatic, <50% confined to bed  General Appearance:  alert, cooperative, no apparent distress and appears stated age   Neurologic/Psychiatric: A&O x 3, gait steady, appropriate affect, strength 5/5 in all muscle groups   HEENT:  Normocephalic, without obvious abnormality, mucous membranes moist, mild erythema noted to throat with tender cervical adenopathy on the left, no coating to tongue, no ulceration, mild erythema noted to oropharynx.    Neck: Supple, symmetrical, trachea midline, no adenopathy;  No thyromegaly, masses, or tenderness   Lungs:   Clear to auscultation bilaterally; respirations regular, even, and unlabored bilaterally   Heart:  Regular rate and rhythm, no murmurs appreciated   Abdomen:   Soft, non-tender, non-distended and no organomegaly   Lymph nodes: No cervical, supraclavicular, inguinal or axillary adenopathy noted   Extremities: Normal, atraumatic; no clubbing, cyanosis, or edema, ambulating with cane   Skin: No rashes, ulcers, or suspicious lesions noted     Transcribe Orders on 07/24/2019   Component Date Value Ref Range Status   • WBC 07/24/2019 20.24* 3.40 - 10.80 10*3/mm3 Final   • RBC 07/24/2019 4.10  3.77 - 5.28 10*6/mm3 Final   • Hemoglobin 07/24/2019 12.4  12.0 - 15.9 g/dL Final   • " Hematocrit 07/24/2019 39.6  34.0 - 46.6 % Final   • MCV 07/24/2019 96.6  79.0 - 97.0 fL Final   • MCH 07/24/2019 30.2  26.6 - 33.0 pg Final   • MCHC 07/24/2019 31.3* 31.5 - 35.7 g/dL Final   • RDW 07/24/2019 17.1* 12.3 - 15.4 % Final   • RDW-SD 07/24/2019 61.1* 37.0 - 54.0 fl Final   • MPV 07/24/2019 10.2  6.0 - 12.0 fL Final   • Platelets 07/24/2019 229  140 - 450 10*3/mm3 Final   Admission on 07/19/2019, Discharged on 07/19/2019   Component Date Value Ref Range Status   • WBC 07/19/2019 22.36* 3.40 - 10.80 10*3/mm3 Final   • RBC 07/19/2019 3.95  3.77 - 5.28 10*6/mm3 Final   • Hemoglobin 07/19/2019 12.0  12.0 - 15.9 g/dL Final   • Hematocrit 07/19/2019 37.7  34.0 - 46.6 % Final   • MCV 07/19/2019 95.4  79.0 - 97.0 fL Final   • MCH 07/19/2019 30.4  26.6 - 33.0 pg Final   • MCHC 07/19/2019 31.8  31.5 - 35.7 g/dL Final   • RDW 07/19/2019 17.1* 12.3 - 15.4 % Final   • RDW-SD 07/19/2019 59.6* 37.0 - 54.0 fl Final   • MPV 07/19/2019 9.6  6.0 - 12.0 fL Final   • Platelets 07/19/2019 265  140 - 450 10*3/mm3 Final   • Potassium 07/19/2019 4.1  3.5 - 5.2 mmol/L Final   • Anaerobic Culture 07/19/2019 No anaerobes isolated at 5 days   Final   • Hardware / Foreign Body Culture 07/19/2019 Heavy growth (4+) Staphylococcus aureus, MRSA*  Final      Methicillin resistant Staphylococcus aureus, Patient may be an isolation risk.   • Gram Stain 07/19/2019 Few (2+) WBCs seen   Final   • Gram Stain 07/19/2019 No organisms seen   Final        No results found.    ASSESSMENT: The patient is a very pleasant 79 y.o. 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  Ascending colon adenocarcinoma T3 N1 M0 stage IIIa:  A.  Presenting with abdominal pain and irregular bowel movements  B.  Diagnosed after colonoscopy with a biopsy done on June 27, 2018  C.  Status post right hemicolectomy done by Dr. Wong August 01, 2018  D.  Final pathology revealed with 1 out of 25 positive  mesenteric lymph nodes  E. Started adjuvant chemotherapy using Xeloda with oxaliplatin September 25, 2018, status post 5 cycles  F. CEA 17.8 March 25, 2019  G.  Progressive disease with diffuse liver metastases dominant CAT scan done on March 2019  H. Started on palliative chemotherapy with FOLFIRI plus Avastin March 25, 2019, status post 7 cycles.  I.  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  2.  Hypertension  3.  Normocytic anemia  4.  Chemotherapy-induced mucositis  5.  Chemotherapy-induced nausea  6. Right hip pain  7. Port cellulitis     PLAN:   1. I will proceed with palliative chemotherapy with FOLFIRI plus Avastin as scheduled today cycle #9. We will continue 25% dose reduction on her 5-FU secondary to mucositis.   2.   I will continue to monitor the patient's blood work including blood counts, kidney function, liver functions, and electrolytes.  We will also check CEA on day 1 of each cycle. I told her it decreased to 11.4 with her last treatment.  3.  We will add Keflex 500 mg TID for 7 days for cellulitis symptoms around port site. We will call [facility] to see if she can be evaluated today due to her worsening symptoms. She will continue EMLA cream applied prior to access. I asked her to monitor for signs and symptoms of infection.    4.  The patient had microsatellite instability identified on gene analysis. We will consider immunotherapy in the future if needed for progression on current second line therapy.   5. We will start her on Medrol dose pack for right hip pain and inflammation. She was also given a prescription for Norco 7.5/325 mg taken three times per day as needed for hip pain. She was given a new prescription for this today. I told her to take both medications with food.   6.  We discussed potential side  effects of chemotherapy including diarrhea, colitis, low blood counts, infusion reactions, dehydration, infection, and potential death.  7.  She will continue  Zofran as needed for chemotherapy induced-nausea.  8.  She will follow-up with me in 2 weeks for cycle #10.  9.  I will repeat 3 months CT scan which should be due end of August 2019.  I am little bit concerned about her small ascites.  10.  We will continue atenolol 50 mg daily as well as clonidine 0.1 mg twice a day for hypertension. I asked her to continue to monitor her blood pressure at home. We will adjust her regimen if needed for ongoing hypertension.     Schuyler Coleman MD  7/29/2019

## 2019-07-29 NOTE — TELEPHONE ENCOUNTER
Dr. Coleman asked that Dr Atkinson be contacted to asked about patient stiches from the port removal.  Patient wanted to know if the stitches would dissolve or if she needed to come into office and have them removed.  Discussed with Siomara at Dr. Schaeffer office.  Siomara reported that she would discuss with Dr. Atkinson and call patient to notify.  I went to infusion to inform patient the Dr. Schaeffer office will follow up on stitches.

## 2019-07-31 ENCOUNTER — TELEPHONE (OUTPATIENT)
Dept: ONCOLOGY | Facility: CLINIC | Age: 79
End: 2019-07-31

## 2019-07-31 NOTE — TELEPHONE ENCOUNTER
----- Message from Hui Palacios sent at 7/31/2019  2:26 PM EDT -----  Regarding: [facility]  Contact: [phone]  Alesha with home health called she needs some picc care instructions how often changed, dressing changes? Please call.

## 2019-08-12 DIAGNOSIS — C18.9 MALIGNANT NEOPLASM OF COLON, UNSPECIFIED PART OF COLON (HCC): ICD-10-CM

## 2019-08-13 ENCOUNTER — OFFICE VISIT (OUTPATIENT)
Dept: ONCOLOGY | Facility: CLINIC | Age: 79
End: 2019-08-13

## 2019-08-13 ENCOUNTER — HOSPITAL ENCOUNTER (OUTPATIENT)
Dept: ONCOLOGY | Facility: HOSPITAL | Age: 79
Setting detail: INFUSION SERIES
Discharge: HOME | End: 2019-08-13

## 2019-08-13 VITALS
SYSTOLIC BLOOD PRESSURE: 158 MMHG | HEIGHT: 63 IN | TEMPERATURE: 97.7 F | RESPIRATION RATE: 12 BRPM | OXYGEN SATURATION: 98 % | WEIGHT: 155 LBS | BODY MASS INDEX: 27.46 KG/M2 | HEART RATE: 65 BPM | DIASTOLIC BLOOD PRESSURE: 88 MMHG

## 2019-08-13 VITALS — SYSTOLIC BLOOD PRESSURE: 145 MMHG | DIASTOLIC BLOOD PRESSURE: 68 MMHG

## 2019-08-13 DIAGNOSIS — C18.9 MALIGNANT NEOPLASM OF COLON, UNSPECIFIED PART OF COLON (HCC): Primary | ICD-10-CM

## 2019-08-13 LAB
ALBUMIN SERPL-MCNC: 3.7 G/DL (ref 3.5–5.2)
ALBUMIN/GLOB SERPL: 1.1 G/DL
ALP SERPL-CCNC: 98 U/L (ref 39–117)
ALT SERPL W P-5'-P-CCNC: 15 U/L (ref 1–33)
ANION GAP SERPL CALCULATED.3IONS-SCNC: 11 MMOL/L (ref 5–15)
AST SERPL-CCNC: 20 U/L (ref 1–32)
BILIRUB SERPL-MCNC: 0.4 MG/DL (ref 0.2–1.2)
BILIRUB UR QL STRIP: NEGATIVE
BUN BLD-MCNC: 14 MG/DL (ref 8–23)
BUN/CREAT SERPL: 14.3 (ref 7–25)
CALCIUM SPEC-SCNC: 8.8 MG/DL (ref 8.6–10.5)
CEA SERPL-MCNC: 5.82 NG/ML
CHLORIDE SERPL-SCNC: 102 MMOL/L (ref 98–107)
CLARITY UR: CLEAR
CO2 SERPL-SCNC: 23 MMOL/L (ref 22–29)
COLOR UR: YELLOW
CREAT BLD-MCNC: 0.98 MG/DL (ref 0.57–1)
ERYTHROCYTE [DISTWIDTH] IN BLOOD BY AUTOMATED COUNT: 18 % (ref 12.3–15.4)
GFR SERPL CREATININE-BSD FRML MDRD: 55 ML/MIN/1.73
GLOBULIN UR ELPH-MCNC: 3.5 GM/DL
GLUCOSE BLD-MCNC: 95 MG/DL (ref 65–99)
GLUCOSE UR STRIP-MCNC: NEGATIVE MG/DL
HCT VFR BLD AUTO: 32 % (ref 34–46.6)
HGB BLD-MCNC: 11.2 G/DL (ref 12–15.9)
HGB UR QL STRIP.AUTO: ABNORMAL
KETONES UR QL STRIP: NEGATIVE
LEUKOCYTE ESTERASE UR QL STRIP.AUTO: NEGATIVE
LYMPHOCYTES # BLD AUTO: 6.2 10*3/MM3 (ref 0.7–3.1)
LYMPHOCYTES NFR BLD AUTO: 61.1 % (ref 19.6–45.3)
MCH RBC QN AUTO: 33.1 PG (ref 26.6–33)
MCHC RBC AUTO-ENTMCNC: 34.9 G/DL (ref 31.5–35.7)
MCV RBC AUTO: 94.7 FL (ref 79–97)
MONOCYTES # BLD AUTO: 1 10*3/MM3 (ref 0.1–0.9)
MONOCYTES NFR BLD AUTO: 9.7 % (ref 5–12)
NEUTROPHILS # BLD AUTO: 3 10*3/MM3 (ref 1.7–7)
NEUTROPHILS NFR BLD AUTO: 29.2 % (ref 42.7–76)
NITRITE UR QL STRIP: NEGATIVE
PH UR STRIP.AUTO: 5 [PH] (ref 5–8)
PLATELET # BLD AUTO: 208 10*3/MM3 (ref 140–450)
PMV BLD AUTO: 6.8 FL (ref 6–12)
POTASSIUM BLD-SCNC: 4 MMOL/L (ref 3.5–5.2)
PROT SERPL-MCNC: 7.2 G/DL (ref 6–8.5)
PROT UR QL STRIP: NEGATIVE
RBC # BLD AUTO: 3.38 10*6/MM3 (ref 3.77–5.28)
SODIUM BLD-SCNC: 136 MMOL/L (ref 136–145)
SP GR UR STRIP: 1.01 (ref 1–1.03)
UROBILINOGEN UR QL STRIP: ABNORMAL
WBC NRBC COR # BLD: 10.2 10*3/MM3 (ref 3.4–10.8)

## 2019-08-13 PROCEDURE — 25010000002 PALONOSETRON 0.25 MG/5ML SOLUTION PREFILLED SYRINGE: Performed by: INTERNAL MEDICINE

## 2019-08-13 PROCEDURE — 96416 CHEMO PROLONG INFUSE W/PUMP: CPT

## 2019-08-13 PROCEDURE — 96415 CHEMO IV INFUSION ADDL HR: CPT

## 2019-08-13 PROCEDURE — 96417 CHEMO IV INFUS EACH ADDL SEQ: CPT

## 2019-08-13 PROCEDURE — 25010000002 BEVACIZUMAB PER 10 MG: Performed by: INTERNAL MEDICINE

## 2019-08-13 PROCEDURE — 96413 CHEMO IV INFUSION 1 HR: CPT

## 2019-08-13 PROCEDURE — 99215 OFFICE O/P EST HI 40 MIN: CPT | Performed by: INTERNAL MEDICINE

## 2019-08-13 PROCEDURE — 25010000002 ATROPINE PER 0.01 MG: Performed by: INTERNAL MEDICINE

## 2019-08-13 PROCEDURE — 96368 THER/DIAG CONCURRENT INF: CPT

## 2019-08-13 PROCEDURE — 25010000002 DEXAMETHASONE PER 1 MG: Performed by: INTERNAL MEDICINE

## 2019-08-13 PROCEDURE — 85025 COMPLETE CBC W/AUTO DIFF WBC: CPT | Performed by: INTERNAL MEDICINE

## 2019-08-13 PROCEDURE — 25010000002 IRINOTECAN PER 20 MG: Performed by: INTERNAL MEDICINE

## 2019-08-13 PROCEDURE — 25010000002 LEUCOVORIN CALCIUM PER 50 MG: Performed by: INTERNAL MEDICINE

## 2019-08-13 PROCEDURE — 81003 URINALYSIS AUTO W/O SCOPE: CPT | Performed by: INTERNAL MEDICINE

## 2019-08-13 PROCEDURE — 96411 CHEMO IV PUSH ADDL DRUG: CPT

## 2019-08-13 PROCEDURE — 25010000002 FLUOROURACIL PER 500 MG: Performed by: INTERNAL MEDICINE

## 2019-08-13 PROCEDURE — 96375 TX/PRO/DX INJ NEW DRUG ADDON: CPT

## 2019-08-13 PROCEDURE — 82378 CARCINOEMBRYONIC ANTIGEN: CPT | Performed by: INTERNAL MEDICINE

## 2019-08-13 PROCEDURE — 80053 COMPREHEN METABOLIC PANEL: CPT | Performed by: INTERNAL MEDICINE

## 2019-08-13 RX ORDER — PALONOSETRON 0.05 MG/ML
0.25 INJECTION, SOLUTION INTRAVENOUS ONCE
Status: COMPLETED | OUTPATIENT
Start: 2019-08-13 | End: 2019-08-13

## 2019-08-13 RX ORDER — SODIUM CHLORIDE 9 MG/ML
250 INJECTION, SOLUTION INTRAVENOUS ONCE
Status: COMPLETED | OUTPATIENT
Start: 2019-08-13 | End: 2019-08-13

## 2019-08-13 RX ORDER — ATROPINE SULFATE 1 MG/ML
0.25 INJECTION, SOLUTION INTRAMUSCULAR; INTRAVENOUS; SUBCUTANEOUS
Status: CANCELLED | OUTPATIENT
Start: 2019-08-13

## 2019-08-13 RX ORDER — ATROPINE SULFATE 1 MG/ML
0.25 INJECTION, SOLUTION INTRAMUSCULAR; INTRAVENOUS; SUBCUTANEOUS
Status: DISCONTINUED | OUTPATIENT
Start: 2019-08-13 | End: 2019-08-14 | Stop reason: HOSPADM

## 2019-08-13 RX ORDER — SODIUM CHLORIDE 9 MG/ML
250 INJECTION, SOLUTION INTRAVENOUS ONCE
Status: CANCELLED | OUTPATIENT
Start: 2019-08-27

## 2019-08-13 RX ORDER — PALONOSETRON 0.05 MG/ML
0.25 INJECTION, SOLUTION INTRAVENOUS ONCE
Status: CANCELLED | OUTPATIENT
Start: 2019-08-13

## 2019-08-13 RX ORDER — PALONOSETRON 0.05 MG/ML
0.25 INJECTION, SOLUTION INTRAVENOUS ONCE
Status: CANCELLED | OUTPATIENT
Start: 2019-08-27

## 2019-08-13 RX ORDER — ATROPINE SULFATE 1 MG/ML
0.25 INJECTION, SOLUTION INTRAMUSCULAR; INTRAVENOUS; SUBCUTANEOUS
Status: CANCELLED | OUTPATIENT
Start: 2019-08-27

## 2019-08-13 RX ORDER — FLUOROURACIL 50 MG/ML
300 INJECTION, SOLUTION INTRAVENOUS ONCE
Status: CANCELLED | OUTPATIENT
Start: 2019-08-27

## 2019-08-13 RX ORDER — FLUOROURACIL 50 MG/ML
300 INJECTION, SOLUTION INTRAVENOUS ONCE
Status: COMPLETED | OUTPATIENT
Start: 2019-08-13 | End: 2019-08-13

## 2019-08-13 RX ORDER — FLUOROURACIL 50 MG/ML
300 INJECTION, SOLUTION INTRAVENOUS ONCE
Status: CANCELLED | OUTPATIENT
Start: 2019-08-13

## 2019-08-13 RX ORDER — SODIUM CHLORIDE 9 MG/ML
250 INJECTION, SOLUTION INTRAVENOUS ONCE
Status: CANCELLED | OUTPATIENT
Start: 2019-08-13

## 2019-08-13 RX ADMIN — PALONOSETRON 0.25 MG: 0.25 INJECTION, SOLUTION INTRAVENOUS at 10:06

## 2019-08-13 RX ADMIN — DEXAMETHASONE SODIUM PHOSPHATE 12 MG: 4 INJECTION, SOLUTION INTRA-ARTICULAR; INTRALESIONAL; INTRAMUSCULAR; INTRAVENOUS; SOFT TISSUE at 10:09

## 2019-08-13 RX ADMIN — LEUCOVORIN CALCIUM 490 MG: 100 INJECTION, POWDER, LYOPHILIZED, FOR SOLUTION INTRAMUSCULAR; INTRAVENOUS at 11:04

## 2019-08-13 RX ADMIN — BEVACIZUMAB 310 MG: 400 INJECTION, SOLUTION INTRAVENOUS at 10:29

## 2019-08-13 RX ADMIN — DEXTROSE MONOHYDRATE 300 MG: 50 INJECTION, SOLUTION INTRAVENOUS at 11:04

## 2019-08-13 RX ADMIN — FLUOROURACIL 490 MG: 50 INJECTION, SOLUTION INTRAVENOUS at 13:00

## 2019-08-13 RX ADMIN — ATROPINE SULFATE 0.25 MG: 1 INJECTION, SOLUTION INTRAMUSCULAR; INTRAVENOUS; SUBCUTANEOUS at 11:03

## 2019-08-13 RX ADMIN — FLUOROURACIL 3000 MG: 50 INJECTION, SOLUTION INTRAVENOUS at 13:04

## 2019-08-13 RX ADMIN — SODIUM CHLORIDE 250 ML: 9 INJECTION, SOLUTION INTRAVENOUS at 10:05

## 2019-08-13 NOTE — PROGRESS NOTES
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     HISTORY OF PRESENT ILLNESS: The patient is a very pleasant 79 y.o. female  with past medical history significant for colon cancer diagnosed 07/04/18. The patient presented to [facility] for gastritis and right lower quadrant pain.  This has been going on for the last few months associated with nausea but no vomiting.  She's been having one of dark stools.  Never had this problem before.  She had previous negative colonoscopy.  Colon polyp was found and biopsied. Surgical pathology 07/04/18 revealed invasive poorly differentiated adenocarcinoma in ascending colon. CT abdomen and pelvis 07/06/18 showed mass within the ascending colon with large mercy mass in the adjacent mesentery and prominent nodes around the cecum. The patient presented to Dr Juarez for elective right hemicolectomy on 08/01/18. Pathology 08/01/18 from colon and terminal ileum resection revealed poorly differentiated colonic adenocarcinoma ([id number]) with metastatic adenocarcinoma to 1 of 25 lymph nodes.  She was started on adjuvant chemotherapy using oxaliplatin with Xeloda September 25, 2018.  The patient completed 5 cycles and then decide to stop secondary to multiple side effects.  Repeated scans done on March 16, 2019 revealed new liver metastases.  She was started on palliative chemotherapy with FOLFIRI plus Avastin March 25, 2019.  The patient is here today for follow up visit was cycle #10.        SUBJECTIVE: The patient is here today with her niece.  She continues to tolerate treatment fairly well.  She has fatigue that lasts for approximately 1 week after each infusion.  She has nausea that improves with Zofran.  Her appetite is good.    PAST MEDICAL HISTORY/SOCIAL HISTORY/FAMILY HISTORY: Reviewed by me and unchanged from my documentation done on 09/17/18.    Review of Systems    Constitutional: Positive for fatigue. Negative for activity change, appetite change, chills, fever and unexpected weight change.   HENT: Positive for congestion. Negative for hearing loss, mouth sores, nosebleeds, postnasal drip and trouble swallowing.    Eyes: Negative for visual disturbance.   Respiratory: Negative for cough, chest tightness, shortness of breath and wheezing.    Cardiovascular: Negative for chest pain, palpitations and leg swelling.        Elevated blood pressure   Gastrointestinal: Positive for nausea. Negative for abdominal distention, abdominal pain, blood in stool, constipation, diarrhea, rectal pain and vomiting.   Endocrine: Negative for cold intolerance and heat intolerance.   Genitourinary: Negative for difficulty urinating, dysuria, frequency and urgency.   Musculoskeletal: Positive for gait problem. Negative for arthralgias, back pain, joint swelling and myalgias.        Right hip/buttocks pain   Skin: Negative for rash.        Redness to port site   Neurological: Negative for dizziness, tremors, syncope, weakness, light-headedness, numbness and headaches.   Hematological: Negative for adenopathy. Does not bruise/bleed easily.   Psychiatric/Behavioral: Negative for confusion, sleep disturbance and suicidal ideas. The patient is not nervous/anxious.          Current Outpatient Medications:   •  atenolol (TENORMIN) 50 MG tablet, Take 50 mg by mouth Daily., Disp: , Rfl:   •  cephalexin (KEFLEX) 500 MG capsule, Take 1 capsule by mouth 3 (Three) Times a Day., Disp: 21 capsule, Rfl: 0  •  CloNIDine (CATAPRES) 0.1 MG tablet, , Disp: , Rfl:   •  HYDROcodone-acetaminophen (NORCO) 7.5-325 MG per tablet, Take 1 tablet by mouth Every 6 (Six) Hours As Needed for Moderate Pain ., Disp: 90 tablet, Rfl: 0  •  lidocaine-prilocaine (EMLA) 2.5-2.5 % cream, Apply  topically to the appropriate area as directed As Needed (45-60 minutes prior to port access.  Cover with saran/plastic wrap.)., Disp: 30 g,  "Rfl: 3  •  megestrol (MEGACE) 40 MG/ML suspension, Take 20 mL by mouth Daily., Disp: 480 mL, Rfl: 5  •  methylPREDNISolone (MEDROL, ROSEMARY,) 4 MG tablet, Take as directed on package instructions., Disp: 21 tablet, Rfl: 0  •  nystatin susp + lidocaine viscous (MAGIC MOUTHWASH) oral suspension, 5-10 ml swish and spit or swallow QID prn, Disp: 240 mL, Rfl: 3  •  ondansetron (ZOFRAN) 8 MG tablet, Take 1 tablet by mouth Every 8 (Eight) Hours As Needed for Nausea., Disp: 60 tablet, Rfl: 5  •  raNITIdine (ZANTAC) 150 MG tablet, Take 150 mg by mouth As Needed for Heartburn., Disp: , Rfl:     PHYSICAL EXAMINATION:   /88   Pulse 65   Temp 97.7 °F (36.5 °C) (Temporal)   Resp 12   Ht 160 cm (62.99\")   Wt 70.3 kg (155 lb)   SpO2 98%   BMI 27.47 kg/m²    ECOG Performance Status: 2 - Symptomatic, <50% confined to bed  General Appearance:  alert, cooperative, no apparent distress and appears stated age   Neurologic/Psychiatric: A&O x 3, gait steady, appropriate affect, strength 5/5 in all muscle groups   HEENT:  Normocephalic, without obvious abnormality, mucous membranes moist, mild erythema noted to throat with tender cervical adenopathy on the left, no coating to tongue, no ulceration, mild erythema noted to oropharynx.    Neck: Supple, symmetrical, trachea midline, no adenopathy;  No thyromegaly, masses, or tenderness   Lungs:   Clear to auscultation bilaterally; respirations regular, even, and unlabored bilaterally   Heart:  Regular rate and rhythm, no murmurs appreciated   Abdomen:   Soft, non-tender, non-distended and no organomegaly   Lymph nodes: No cervical, supraclavicular, inguinal or axillary adenopathy noted   Extremities: Normal, atraumatic; no clubbing, cyanosis, or edema, ambulating with cane   Skin: No rashes, ulcers, or suspicious lesions noted     No visits with results within 2 Week(s) from this visit.   Latest known visit with results is:   Hospital Outpatient Visit on 07/29/2019   Component Date " Value Ref Range Status   • Glucose 07/29/2019 68  65 - 99 mg/dL Final   • BUN 07/29/2019 16  8 - 23 mg/dL Final   • Creatinine 07/29/2019 0.83  0.57 - 1.00 mg/dL Final   • Sodium 07/29/2019 134* 136 - 145 mmol/L Final   • Potassium 07/29/2019 4.2  3.5 - 5.2 mmol/L Final   • Chloride 07/29/2019 100  98 - 107 mmol/L Final   • CO2 07/29/2019 21.0* 22.0 - 29.0 mmol/L Final   • Calcium 07/29/2019 9.0  8.6 - 10.5 mg/dL Final   • Total Protein 07/29/2019 7.2  6.0 - 8.5 g/dL Final   • Albumin 07/29/2019 3.70  3.50 - 5.20 g/dL Final   • ALT (SGPT) 07/29/2019 13  1 - 33 U/L Final   • AST (SGOT) 07/29/2019 19  1 - 32 U/L Final   • Alkaline Phosphatase 07/29/2019 113  39 - 117 U/L Final   • Total Bilirubin 07/29/2019 0.3  0.2 - 1.2 mg/dL Final   • eGFR Non African Amer 07/29/2019 66  >60 mL/min/1.73 Final   • Globulin 07/29/2019 3.5  gm/dL Final   • A/G Ratio 07/29/2019 1.1  g/dL Final   • BUN/Creatinine Ratio 07/29/2019 19.3  7.0 - 25.0 Final   • Anion Gap 07/29/2019 13.0  5.0 - 15.0 mmol/L Final   • CEA 07/29/2019 7.52  ng/mL Final   • Color, UA 07/29/2019 Yellow  Yellow, Straw Final   • Appearance, UA 07/29/2019 Clear  Clear Final   • pH, UA 07/29/2019 6.0  5.0 - 8.0 Final   • Specific Gravity, UA 07/29/2019 1.015  1.005 - 1.030 Final   • Glucose, UA 07/29/2019 Negative  Negative Final   • Ketones, UA 07/29/2019 Negative  Negative Final   • Bilirubin, UA 07/29/2019 Negative  Negative Final   • Blood, UA 07/29/2019 Trace* Negative Final   • Protein, UA 07/29/2019 Negative  Negative Final   • Leuk Esterase, UA 07/29/2019 Negative  Negative Final   • Nitrite, UA 07/29/2019 Negative  Negative Final   • Urobilinogen, UA 07/29/2019 0.2 E.U./dL  0.2 - 1.0 E.U./dL Final   • WBC 07/29/2019 21.00* 3.40 - 10.80 10*3/mm3 Final   • RBC 07/29/2019 3.74* 3.77 - 5.28 10*6/mm3 Final   • Hemoglobin 07/29/2019 11.8* 12.0 - 15.9 g/dL Final   • Hematocrit 07/29/2019 34.7  34.0 - 46.6 % Final   • RDW 07/29/2019 18.7* 12.3 - 15.4 % Final   • MCV  07/29/2019 92.7  79.0 - 97.0 fL Final   • MCH 07/29/2019 31.5  26.6 - 33.0 pg Final   • MCHC 07/29/2019 34.0  31.5 - 35.7 g/dL Final   • MPV 07/29/2019 7.5  6.0 - 12.0 fL Final   • Platelets 07/29/2019 250  140 - 450 10*3/mm3 Final   • Neutrophil % 07/29/2019 40.0* 42.7 - 76.0 % Final   • Lymphocyte % 07/29/2019 51.0* 19.6 - 45.3 % Final   • Monocyte % 07/29/2019 9.0  5.0 - 12.0 % Final   • Neutrophils, Absolute 07/29/2019 8.40* 1.70 - 7.00 10*3/mm3 Final   • Lymphocytes, Absolute 07/29/2019 10.70* 0.70 - 3.10 10*3/mm3 Final   • Monocytes, Absolute 07/29/2019 1.90* 0.10 - 0.90 10*3/mm3 Final        No results found.    ASSESSMENT: The patient is a very pleasant 79 y.o. 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  Ascending colon adenocarcinoma T3 N1 M0 stage IIIa:  A.  Presenting with abdominal pain and irregular bowel movements  B.  Diagnosed after colonoscopy with a biopsy done on June 27, 2018  C.  Status post right hemicolectomy done by Dr. Wong August 01, 2018  D.  Final pathology revealed with 1 out of 25 positive mesenteric lymph nodes  E. Started adjuvant chemotherapy using Xeloda with oxaliplatin September 25, 2018, status post 5 cycles  F. CEA 17.8 March 25, 2019  G.  Progressive disease with diffuse liver metastases dominant CAT scan done on March 2019  H. Started on palliative chemotherapy with FOLFIRI plus Avastin March 25, 2019, status post 9 cycles.  I.  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  2.  Hypertension  3.  Normocytic anemia  4.  Chemotherapy-induced mucositis  5.  Chemotherapy-induced nausea  6. Right hip pain  7. Port cellulitis     PLAN:   1. I will proceed with palliative chemotherapy with FOLFIRI plus Avastin as scheduled today cycle #10. We will  continue 25% dose reduction on her 5-FU secondary to mucositis.   2.   I will continue to monitor the patient's blood work including blood counts, kidney function, liver functions, and electrolytes.  We will also check CEA on day 1 of each cycle. I told her it decreased to 11.4 with her last treatment.  3.  We will add Keflex 500 mg TID for 7 days for cellulitis symptoms around port site. We will call [facility] to see if she can be evaluated today due to her worsening symptoms. She will continue EMLA cream applied prior to access. I asked her to monitor for signs and symptoms of infection.    4.  The patient had microsatellite instability identified on gene analysis. We will consider immunotherapy in the future if needed for progression on current second line therapy.   5. We will start her on Medrol dose pack for right hip pain and inflammation. She was also given a prescription for Norco 7.5/325 mg taken three times per day as needed for hip pain. She was given a new prescription for this today. I told her to take both medications with food.   6.  We discussed potential side effects of chemotherapy including diarrhea, colitis, low blood counts, infusion reactions, dehydration, infection, and potential death.  7.  She will continue  Zofran as needed for chemotherapy induced-nausea.  8.  She will follow-up with me in 2 weeks for cycle #11.  9.  I will repeat 3 months CT scan which should be due end of August 2019.  I am little bit concerned about her small ascites.  Those will be ordered prior to return.  10.  We will continue atenolol 50 mg daily as well as clonidine 0.1 mg twice a day for hypertension.    Schuyler Coleman MD  8/13/2019

## 2019-08-27 ENCOUNTER — HOSPITAL ENCOUNTER (OUTPATIENT)
Dept: ONCOLOGY | Facility: HOSPITAL | Age: 79
Setting detail: INFUSION SERIES
Discharge: HOME | End: 2019-08-27

## 2019-08-27 ENCOUNTER — OFFICE VISIT (OUTPATIENT)
Dept: ONCOLOGY | Facility: CLINIC | Age: 79
End: 2019-08-27

## 2019-08-27 ENCOUNTER — HOSPITAL ENCOUNTER (OUTPATIENT)
Dept: CT IMAGING | Facility: HOSPITAL | Age: 79
Discharge: HOME | End: 2019-08-27
Admitting: INTERNAL MEDICINE

## 2019-08-27 VITALS
HEIGHT: 63 IN | DIASTOLIC BLOOD PRESSURE: 91 MMHG | BODY MASS INDEX: 27.64 KG/M2 | HEART RATE: 64 BPM | RESPIRATION RATE: 12 BRPM | OXYGEN SATURATION: 97 % | SYSTOLIC BLOOD PRESSURE: 165 MMHG | TEMPERATURE: 97.9 F | WEIGHT: 156 LBS

## 2019-08-27 VITALS — DIASTOLIC BLOOD PRESSURE: 68 MMHG | SYSTOLIC BLOOD PRESSURE: 131 MMHG

## 2019-08-27 DIAGNOSIS — C18.9 MALIGNANT NEOPLASM OF COLON, UNSPECIFIED PART OF COLON (HCC): Primary | ICD-10-CM

## 2019-08-27 DIAGNOSIS — C18.9 MALIGNANT NEOPLASM OF COLON, UNSPECIFIED PART OF COLON (HCC): ICD-10-CM

## 2019-08-27 LAB
ALBUMIN SERPL-MCNC: 4.1 G/DL (ref 3.5–5.2)
ALBUMIN/GLOB SERPL: 1.2 G/DL
ALP SERPL-CCNC: 95 U/L (ref 39–117)
ALT SERPL W P-5'-P-CCNC: 18 U/L (ref 1–33)
ANION GAP SERPL CALCULATED.3IONS-SCNC: 12 MMOL/L (ref 5–15)
AST SERPL-CCNC: 23 U/L (ref 1–32)
BILIRUB SERPL-MCNC: 0.5 MG/DL (ref 0.2–1.2)
BILIRUB UR QL STRIP: NEGATIVE
BUN BLD-MCNC: 12 MG/DL (ref 8–23)
BUN/CREAT SERPL: 15.2 (ref 7–25)
CALCIUM SPEC-SCNC: 9 MG/DL (ref 8.6–10.5)
CEA SERPL-MCNC: 6.01 NG/ML
CHLORIDE SERPL-SCNC: 100 MMOL/L (ref 98–107)
CLARITY UR: CLEAR
CO2 SERPL-SCNC: 22 MMOL/L (ref 22–29)
COLOR UR: YELLOW
CREAT BLD-MCNC: 0.79 MG/DL (ref 0.57–1)
ERYTHROCYTE [DISTWIDTH] IN BLOOD BY AUTOMATED COUNT: 17.5 % (ref 12.3–15.4)
GFR SERPL CREATININE-BSD FRML MDRD: 70 ML/MIN/1.73
GLOBULIN UR ELPH-MCNC: 3.3 GM/DL
GLUCOSE BLD-MCNC: 85 MG/DL (ref 65–99)
GLUCOSE UR STRIP-MCNC: NEGATIVE MG/DL
HCT VFR BLD AUTO: 36.4 % (ref 34–46.6)
HGB BLD-MCNC: 12.1 G/DL (ref 12–15.9)
HGB UR QL STRIP.AUTO: ABNORMAL
KETONES UR QL STRIP: NEGATIVE
LEUKOCYTE ESTERASE UR QL STRIP.AUTO: NEGATIVE
LYMPHOCYTES # BLD AUTO: 7.7 10*3/MM3 (ref 0.7–3.1)
LYMPHOCYTES NFR BLD AUTO: 58.9 % (ref 19.6–45.3)
MCH RBC QN AUTO: 31.4 PG (ref 26.6–33)
MCHC RBC AUTO-ENTMCNC: 33.3 G/DL (ref 31.5–35.7)
MCV RBC AUTO: 94.3 FL (ref 79–97)
MONOCYTES # BLD AUTO: 1.2 10*3/MM3 (ref 0.1–0.9)
MONOCYTES NFR BLD AUTO: 9.4 % (ref 5–12)
NEUTROPHILS # BLD AUTO: 4.2 10*3/MM3 (ref 1.7–7)
NEUTROPHILS NFR BLD AUTO: 31.7 % (ref 42.7–76)
NITRITE UR QL STRIP: NEGATIVE
PH UR STRIP.AUTO: 5 [PH] (ref 5–8)
PLATELET # BLD AUTO: 231 10*3/MM3 (ref 140–450)
PMV BLD AUTO: 6.6 FL (ref 6–12)
POTASSIUM BLD-SCNC: 4.4 MMOL/L (ref 3.5–5.2)
PROT SERPL-MCNC: 7.4 G/DL (ref 6–8.5)
PROT UR QL STRIP: NEGATIVE
RBC # BLD AUTO: 3.86 10*6/MM3 (ref 3.77–5.28)
SODIUM BLD-SCNC: 134 MMOL/L (ref 136–145)
SP GR UR STRIP: 1 (ref 1–1.03)
UROBILINOGEN UR QL STRIP: ABNORMAL
WBC NRBC COR # BLD: 13.1 10*3/MM3 (ref 3.4–10.8)

## 2019-08-27 PROCEDURE — 25010000002 BEVACIZUMAB PER 10 MG: Performed by: INTERNAL MEDICINE

## 2019-08-27 PROCEDURE — 71260 CT THORAX DX C+: CPT

## 2019-08-27 PROCEDURE — 96413 CHEMO IV INFUSION 1 HR: CPT

## 2019-08-27 PROCEDURE — 25010000002 ATROPINE PER 0.01 MG: Performed by: INTERNAL MEDICINE

## 2019-08-27 PROCEDURE — 85025 COMPLETE CBC W/AUTO DIFF WBC: CPT | Performed by: INTERNAL MEDICINE

## 2019-08-27 PROCEDURE — 81003 URINALYSIS AUTO W/O SCOPE: CPT | Performed by: INTERNAL MEDICINE

## 2019-08-27 PROCEDURE — 96415 CHEMO IV INFUSION ADDL HR: CPT

## 2019-08-27 PROCEDURE — 99214 OFFICE O/P EST MOD 30 MIN: CPT | Performed by: NURSE PRACTITIONER

## 2019-08-27 PROCEDURE — 25010000002 DEXAMETHASONE PER 1 MG: Performed by: INTERNAL MEDICINE

## 2019-08-27 PROCEDURE — 80053 COMPREHEN METABOLIC PANEL: CPT | Performed by: INTERNAL MEDICINE

## 2019-08-27 PROCEDURE — 82378 CARCINOEMBRYONIC ANTIGEN: CPT | Performed by: INTERNAL MEDICINE

## 2019-08-27 PROCEDURE — 74177 CT ABD & PELVIS W/CONTRAST: CPT

## 2019-08-27 PROCEDURE — 25010000002 PALONOSETRON 0.25 MG/5ML SOLUTION PREFILLED SYRINGE: Performed by: INTERNAL MEDICINE

## 2019-08-27 PROCEDURE — 25010000002 FLUOROURACIL PER 500 MG: Performed by: INTERNAL MEDICINE

## 2019-08-27 PROCEDURE — 96368 THER/DIAG CONCURRENT INF: CPT

## 2019-08-27 PROCEDURE — 96416 CHEMO PROLONG INFUSE W/PUMP: CPT

## 2019-08-27 PROCEDURE — 96417 CHEMO IV INFUS EACH ADDL SEQ: CPT

## 2019-08-27 PROCEDURE — 96366 THER/PROPH/DIAG IV INF ADDON: CPT

## 2019-08-27 PROCEDURE — 96375 TX/PRO/DX INJ NEW DRUG ADDON: CPT

## 2019-08-27 PROCEDURE — 25010000002 LEUCOVORIN 500 MG RECONSTITUTED SOLUTION 1 EACH VIAL: Performed by: INTERNAL MEDICINE

## 2019-08-27 PROCEDURE — 96365 THER/PROPH/DIAG IV INF INIT: CPT

## 2019-08-27 PROCEDURE — 96411 CHEMO IV PUSH ADDL DRUG: CPT

## 2019-08-27 PROCEDURE — 25010000002 IOPAMIDOL 61 % SOLUTION: Performed by: INTERNAL MEDICINE

## 2019-08-27 PROCEDURE — 25010000002 IRINOTECAN PER 20 MG: Performed by: INTERNAL MEDICINE

## 2019-08-27 RX ORDER — ATROPINE SULFATE 1 MG/ML
0.25 INJECTION, SOLUTION INTRAMUSCULAR; INTRAVENOUS; SUBCUTANEOUS
Status: DISCONTINUED | OUTPATIENT
Start: 2019-08-27 | End: 2019-08-28 | Stop reason: HOSPADM

## 2019-08-27 RX ORDER — PALONOSETRON 0.05 MG/ML
0.25 INJECTION, SOLUTION INTRAVENOUS ONCE
Status: COMPLETED | OUTPATIENT
Start: 2019-08-27 | End: 2019-08-27

## 2019-08-27 RX ORDER — FLUOROURACIL 50 MG/ML
300 INJECTION, SOLUTION INTRAVENOUS ONCE
Status: COMPLETED | OUTPATIENT
Start: 2019-08-27 | End: 2019-08-27

## 2019-08-27 RX ORDER — SODIUM CHLORIDE 9 MG/ML
250 INJECTION, SOLUTION INTRAVENOUS ONCE
Status: COMPLETED | OUTPATIENT
Start: 2019-08-27 | End: 2019-08-27

## 2019-08-27 RX ADMIN — IOPAMIDOL 95 ML: 612 INJECTION, SOLUTION INTRAVENOUS at 09:32

## 2019-08-27 RX ADMIN — FLUOROURACIL 490 MG: 50 INJECTION, SOLUTION INTRAVENOUS at 16:08

## 2019-08-27 RX ADMIN — SODIUM CHLORIDE 250 ML: 9 INJECTION, SOLUTION INTRAVENOUS at 13:26

## 2019-08-27 RX ADMIN — DEXAMETHASONE SODIUM PHOSPHATE 12 MG: 4 INJECTION, SOLUTION INTRAMUSCULAR; INTRAVENOUS at 13:26

## 2019-08-27 RX ADMIN — ATROPINE SULFATE 0.25 MG: 1 INJECTION, SOLUTION INTRAMUSCULAR; INTRAVENOUS; SUBCUTANEOUS at 14:25

## 2019-08-27 RX ADMIN — BEVACIZUMAB 310 MG: 400 INJECTION, SOLUTION INTRAVENOUS at 13:48

## 2019-08-27 RX ADMIN — LEUCOVORIN CALCIUM 490 MG: 500 INJECTION, POWDER, LYOPHILIZED, FOR SOLUTION INTRAMUSCULAR; INTRAVENOUS at 14:26

## 2019-08-27 RX ADMIN — FLUOROURACIL 3000 MG: 50 INJECTION, SOLUTION INTRAVENOUS at 16:13

## 2019-08-27 RX ADMIN — PALONOSETRON 0.25 MG: 0.25 INJECTION, SOLUTION INTRAVENOUS at 13:27

## 2019-08-27 RX ADMIN — DEXTROSE MONOHYDRATE 300 MG: 50 INJECTION, SOLUTION INTRAVENOUS at 14:27

## 2019-08-28 ENCOUNTER — TELEPHONE (OUTPATIENT)
Dept: ONCOLOGY | Facility: CLINIC | Age: 79
End: 2019-08-28

## 2019-08-28 ENCOUNTER — HOSPITAL ENCOUNTER (OUTPATIENT)
Dept: ONCOLOGY | Facility: HOSPITAL | Age: 79
Setting detail: INFUSION SERIES
Discharge: HOME | End: 2019-08-28

## 2019-08-28 VITALS
WEIGHT: 158 LBS | HEART RATE: 65 BPM | HEIGHT: 63 IN | BODY MASS INDEX: 28 KG/M2 | RESPIRATION RATE: 16 BRPM | SYSTOLIC BLOOD PRESSURE: 184 MMHG | DIASTOLIC BLOOD PRESSURE: 84 MMHG | TEMPERATURE: 97.3 F

## 2019-08-28 DIAGNOSIS — C18.9 MALIGNANT NEOPLASM OF COLON, UNSPECIFIED PART OF COLON (HCC): ICD-10-CM

## 2019-08-28 PROCEDURE — G0463 HOSPITAL OUTPT CLINIC VISIT: HCPCS

## 2019-08-28 NOTE — TELEPHONE ENCOUNTER
----- Message from Hui Palacios sent at 8/28/2019  9:34 AM EDT -----  Regarding: [facility]  Contact: [phone]  Lisa with home health called patient's chemo bulb is not infusing, but the picc line is working well. Please call.

## 2019-08-28 NOTE — TELEPHONE ENCOUNTER
Returned call to patient after discussing with Charge RN with infusion (Dianne) that patient will need to come back to infusion in [place] to bulb malfunction check.  Patient verbalized understanding and will return to [place] today.

## 2019-08-28 NOTE — PROGRESS NOTES
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     HISTORY OF PRESENT ILLNESS: The patient is a very pleasant 79 y.o. female  with past medical history significant for colon cancer diagnosed 07/04/18. The patient presented to [facility] for gastritis and right lower quadrant pain.  This has been going on for the last few months associated with nausea but no vomiting.  She's been having one of dark stools.  Never had this problem before.  She had previous negative colonoscopy.  Colon polyp was found and biopsied. Surgical pathology 07/04/18 revealed invasive poorly differentiated adenocarcinoma in ascending colon. CT abdomen and pelvis 07/06/18 showed mass within the ascending colon with large mercy mass in the adjacent mesentery and prominent nodes around the cecum. The patient presented to Dr Juarez for elective right hemicolectomy on 08/01/18. Pathology 08/01/18 from colon and terminal ileum resection revealed poorly differentiated colonic adenocarcinoma ([id number]) with metastatic adenocarcinoma to 1 of 25 lymph nodes.  She was started on adjuvant chemotherapy using oxaliplatin with Xeloda September 25, 2018.  The patient completed 5 cycles and then decide to stop secondary to multiple side effects.  Repeated scans done on March 16, 2019 revealed new liver metastases.  She was started on palliative chemotherapy with FOLFIRI plus Avastin March 25, 2019.  The patient is here today for follow up visit was cycle #11.        SUBJECTIVE: The patient is here today with her niece. She is doing fairly well. She continues to have some mild throat discomfort, however it has improved since dose reduction. Her PICC line is functioning well. She denies abdominal pain, nausea, vomiting, or diarrhea. She has had no fever or chills. She denies shortness of breath. She is trying to stay active. She is anxious today regarding  the results of her CAT scans.     PAST MEDICAL HISTORY/SOCIAL HISTORY/FAMILY HISTORY: Reviewed by me and unchanged from my documentation done on 09/17/18.    Review of Systems   Constitutional: Positive for fatigue. Negative for activity change, appetite change, chills, fever and unexpected weight change.   HENT: Positive for sore throat. Negative for congestion, hearing loss, mouth sores, nosebleeds, postnasal drip and trouble swallowing.    Eyes: Negative for visual disturbance.   Respiratory: Negative for cough, chest tightness, shortness of breath and wheezing.    Cardiovascular: Negative for chest pain, palpitations and leg swelling.        Elevated blood pressure   Gastrointestinal: Negative for abdominal distention, abdominal pain, blood in stool, constipation, diarrhea, nausea, rectal pain and vomiting.   Endocrine: Negative for cold intolerance and heat intolerance.   Genitourinary: Negative for difficulty urinating, dysuria, frequency and urgency.   Musculoskeletal: Positive for arthralgias and gait problem. Negative for back pain, joint swelling and myalgias.   Skin: Negative for rash.   Neurological: Negative for dizziness, tremors, syncope, weakness, light-headedness, numbness and headaches.   Hematological: Negative for adenopathy. Does not bruise/bleed easily.   Psychiatric/Behavioral: Negative for confusion, sleep disturbance and suicidal ideas. The patient is nervous/anxious.          Current Outpatient Medications:   •  atenolol (TENORMIN) 50 MG tablet, Take 50 mg by mouth Daily., Disp: , Rfl:   •  cephalexin (KEFLEX) 500 MG capsule, Take 1 capsule by mouth 3 (Three) Times a Day., Disp: 21 capsule, Rfl: 0  •  CloNIDine (CATAPRES) 0.1 MG tablet, , Disp: , Rfl:   •  HYDROcodone-acetaminophen (NORCO) 7.5-325 MG per tablet, Take 1 tablet by mouth Every 6 (Six) Hours As Needed for Moderate Pain ., Disp: 90 tablet, Rfl: 0  •  lidocaine-prilocaine (EMLA) 2.5-2.5 % cream, Apply  topically to the  "appropriate area as directed As Needed (45-60 minutes prior to port access.  Cover with saran/plastic wrap.)., Disp: 30 g, Rfl: 3  •  megestrol (MEGACE) 40 MG/ML suspension, Take 20 mL by mouth Daily., Disp: 480 mL, Rfl: 5  •  methylPREDNISolone (MEDROL, ROSEMARY,) 4 MG tablet, Take as directed on package instructions., Disp: 21 tablet, Rfl: 0  •  nystatin susp + lidocaine viscous (MAGIC MOUTHWASH) oral suspension, 5-10 ml swish and spit or swallow QID prn, Disp: 240 mL, Rfl: 3  •  ondansetron (ZOFRAN) 8 MG tablet, Take 1 tablet by mouth Every 8 (Eight) Hours As Needed for Nausea., Disp: 60 tablet, Rfl: 5  •  raNITIdine (ZANTAC) 150 MG tablet, Take 150 mg by mouth As Needed for Heartburn., Disp: , Rfl:   No current facility-administered medications for this visit.     PHYSICAL EXAMINATION:   /91   Pulse 64   Temp 97.9 °F (36.6 °C) (Temporal)   Resp 12   Ht 160 cm (62.99\")   Wt 70.8 kg (156 lb)   SpO2 97%   BMI 27.64 kg/m²    ECOG Performance Status: 1 - Symptomatic but completely ambulatory  General Appearance:  alert, cooperative, no apparent distress and appears stated age   Neurologic/Psychiatric: A&O x 3, gait steady, appropriate affect, strength 5/5 in all muscle groups   HEENT:  Normocephalic, without obvious abnormality, mucous membranes moist, mild erythema noted to throat with tender cervical adenopathy on the left, no coating to tongue, no ulceration, mild erythema noted to oropharynx.    Neck: Supple, symmetrical, trachea midline, no adenopathy;  No thyromegaly, masses, or tenderness   Lungs:   Clear to auscultation bilaterally; respirations regular, even, and unlabored bilaterally   Heart:  Regular rate and rhythm, no murmurs appreciated   Abdomen:   Soft, non-tender, non-distended and no organomegaly   Lymph nodes: No cervical, supraclavicular, inguinal or axillary adenopathy noted   Extremities: Normal, atraumatic; no clubbing, cyanosis, or edema, ambulating with cane   Skin: No rashes, ulcers, " or suspicious lesions noted, chest wall site of prior port healing with some residual redness noted, although no signs of infection seen on exam, there is a palpable suture at the incision site.      Hospital Outpatient Visit on 08/27/2019   Component Date Value Ref Range Status   • CEA 08/27/2019 6.01  ng/mL Final   • Glucose 08/27/2019 85  65 - 99 mg/dL Final   • BUN 08/27/2019 12  8 - 23 mg/dL Final   • Creatinine 08/27/2019 0.79  0.57 - 1.00 mg/dL Final   • Sodium 08/27/2019 134* 136 - 145 mmol/L Final   • Potassium 08/27/2019 4.4  3.5 - 5.2 mmol/L Final   • Chloride 08/27/2019 100  98 - 107 mmol/L Final   • CO2 08/27/2019 22.0  22.0 - 29.0 mmol/L Final   • Calcium 08/27/2019 9.0  8.6 - 10.5 mg/dL Final   • Total Protein 08/27/2019 7.4  6.0 - 8.5 g/dL Final   • Albumin 08/27/2019 4.10  3.50 - 5.20 g/dL Final   • ALT (SGPT) 08/27/2019 18  1 - 33 U/L Final   • AST (SGOT) 08/27/2019 23  1 - 32 U/L Final   • Alkaline Phosphatase 08/27/2019 95  39 - 117 U/L Final   • Total Bilirubin 08/27/2019 0.5  0.2 - 1.2 mg/dL Final   • eGFR Non African Amer 08/27/2019 70  >60 mL/min/1.73 Final   • Globulin 08/27/2019 3.3  gm/dL Final   • A/G Ratio 08/27/2019 1.2  g/dL Final   • BUN/Creatinine Ratio 08/27/2019 15.2  7.0 - 25.0 Final   • Anion Gap 08/27/2019 12.0  5.0 - 15.0 mmol/L Final   • Color, UA 08/27/2019 Yellow  Yellow, Straw Final   • Appearance, UA 08/27/2019 Clear  Clear Final   • pH, UA 08/27/2019 5.0  5.0 - 8.0 Final   • Specific Gravity, UA 08/27/2019 1.005  1.005 - 1.030 Final   • Glucose, UA 08/27/2019 Negative  Negative Final   • Ketones, UA 08/27/2019 Negative  Negative Final   • Bilirubin, UA 08/27/2019 Negative  Negative Final   • Blood, UA 08/27/2019 Trace* Negative Final   • Protein, UA 08/27/2019 Negative  Negative Final   • Leuk Esterase, UA 08/27/2019 Negative  Negative Final   • Nitrite, UA 08/27/2019 Negative  Negative Final   • Urobilinogen, UA 08/27/2019 0.2 E.U./dL  0.2 - 1.0 E.U./dL Final   • WBC  08/27/2019 13.10* 3.40 - 10.80 10*3/mm3 Final   • RBC 08/27/2019 3.86  3.77 - 5.28 10*6/mm3 Final   • Hemoglobin 08/27/2019 12.1  12.0 - 15.9 g/dL Final   • Hematocrit 08/27/2019 36.4  34.0 - 46.6 % Final   • RDW 08/27/2019 17.5* 12.3 - 15.4 % Final   • MCV 08/27/2019 94.3  79.0 - 97.0 fL Final   • MCH 08/27/2019 31.4  26.6 - 33.0 pg Final   • MCHC 08/27/2019 33.3  31.5 - 35.7 g/dL Final   • MPV 08/27/2019 6.6  6.0 - 12.0 fL Final   • Platelets 08/27/2019 231  140 - 450 10*3/mm3 Final   • Neutrophil % 08/27/2019 31.7* 42.7 - 76.0 % Final   • Lymphocyte % 08/27/2019 58.9* 19.6 - 45.3 % Final   • Monocyte % 08/27/2019 9.4  5.0 - 12.0 % Final   • Neutrophils, Absolute 08/27/2019 4.20  1.70 - 7.00 10*3/mm3 Final   • Lymphocytes, Absolute 08/27/2019 7.70* 0.70 - 3.10 10*3/mm3 Final   • Monocytes, Absolute 08/27/2019 1.20* 0.10 - 0.90 10*3/mm3 Final        Ct Chest With Contrast    Result Date: 8/27/2019  Narrative: EXAMINATION: CT CHEST WITH CONTRAST-08/27/2019:  INDICATION: Restaging colorectal cancer; C18.9-Malignant neoplasm of colon, unspecified.  TECHNIQUE: CT scan of the chest was performed following intravenous contrast.  The radiation dose reduction device was turned on for each scan per the ALARA (As Low as Reasonably Achievable) protocol.  COMPARISON: 05/29/2019.  FINDINGS: There is no axillary lymphadenopathy. There are small stable mediastinal nodes. There is no hilar adenopathy. There is no pericardial or pleural effusion. Images displayed at lung window settings demonstrate no acute inflammatory process.  There is minimal chronic airspace disease in the lingula and in the right lower lobe posteriorly.      Impression: Stable chronic pulmonary findings. There are some small mediastinal nodes which are not abnormal based on size criteria and there are areas of minimal bibasilar airspace disease in the right lower lobe and lingula. All findings are stable and unchanged.  D:  08/27/2019 E:  08/27/2019    This  report was finalized on 8/27/2019 4:51 PM by Dr. Adonay Reese MD.      Ct Abdomen Pelvis With Contrast    Result Date: 8/27/2019  Narrative: EXAMINATION: CT ABDOMEN AND PELVIS WITH CONTRAST-08/27/2019:  INDICATION: Restaging colorectal cancer; C18.9-Malignant neoplasm of colon, unspecified, followup rectal malignancy.  TECHNIQUE:  Multiple axial CT imaging was obtained of the abdomen and pelvis from the lung bases through the pubic symphysis following the administration of intravenous contrast.  The radiation dose reduction device was turned on for each scan per the ALARA (As Low as Reasonably Achievable) protocol.  COMPARISON: 05/29/2019.  FINDINGS:  ABDOMEN: There is a nodular density identified within the right lung base. There are multiple lesions identified within the liver suggesting hepatic metastatic disease. The lesions when compared to the prior study may be slightly smaller on today's examination for example, the largest lesion posteriorly within the right lobe of the liver today measures 3.1 cm and previously measured 3.5 cm. There are no new lesions identified. The liver is homogeneous. The kidneys reveal cyst seen in the left kidney. There is also a cyst seen inferiorly within the right kidney. Both adrenal glands are within normal limits. The pancreas is homogeneous. Vascular calcification seen within the abdominal aorta and iliac vessels. Diverticulosis of the colon without evidence of diverticulitis.  PELVIS: The pelvic organs are unremarkable. The pelvic portions of the gastrointestinal tract are within normal limits. No free fluid or free air.  Delayed imaging reveals contrast seen in the renal collecting systems bilaterally as well as within the ureters and bladder with no evidence of obstruction. The bony structures reveal multilevel degenerative changes seen within the spine and pelvis.      Impression: Slight decrease seen in size of the lesions throughout the liver. There is no other change  seen in the abdomen or pelvis in the interval. No other disease is identified. There is continued improvement in the interval.  D:  08/27/2019 E:  08/27/2019  This report was finalized on 8/27/2019 5:16 PM by Dr. Eden Grant MD.        ASSESSMENT: The patient is a very pleasant 79 y.o. 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  Ascending colon adenocarcinoma T3 N1 M0 stage IIIa:  A.  Presenting with abdominal pain and irregular bowel movements  B.  Diagnosed after colonoscopy with a biopsy done on June 27, 2018  C.  Status post right hemicolectomy done by Dr. Wong August 01, 2018  D.  Final pathology revealed with 1 out of 25 positive mesenteric lymph nodes  E. Started adjuvant chemotherapy using Xeloda with oxaliplatin September 25, 2018, status post 5 cycles  F. CEA 17.8 March 25, 2019  G.  Progressive disease with diffuse liver metastases dominant CAT scan done on March 2019  H. Started on palliative chemotherapy with FOLFIRI plus Avastin March 25, 2019, status post 10 cycles.  I.  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  2.  Hypertension  3.  Normocytic anemia  4.  Chemotherapy-induced mucositis  5.  Chemotherapy-induced nausea  6. Right hip pain  7. Port cellulitis- s/p port removal 7/19/2019.     PLAN:   1. I will proceed with palliative chemotherapy with FOLFIRI plus Avastin as scheduled today, cycle #11. We will continue 25% dose reduction on her 5-FU secondary to mucositis.   2.   I will continue to monitor the patient's blood work including blood counts, kidney function, liver functions, and electrolytes.  We will also check CEA on day 1 of each cycle. I told her it has continued to decrease to 5.82 with her last treatment.  3.  We will continue routine PICC  line care once weekly through home health.   4.  The patient had microsatellite instability identified on gene analysis. We will consider immunotherapy in the future if needed for progression on current second line therapy.   5. She will continue Norco 7.5/325 mg every 6 hours as needed for hip pain.   6.  We reviewed again potential side effects of chemotherapy including diarrhea, colitis, low blood counts, infusion reactions, dehydration, infection, and potential death.  7.  She will continue  Zofran as needed for chemotherapy induced-nausea.  8.  She will follow-up with us in 2 weeks for cycle #12.  9.  I reviewed the CAT scan results with the patient and her niece. I reviewed the images myself and with the patient. I told the patient that it appears as though her liver lesions have improved slightly in size with no evidence of new or progressive disease. I told her I will follow up on the official radiology read and notify her of their impression. The patient will need 3 month follow up CAT scans which will be due November 27, 2019.  10.  We will continue atenolol 50 mg daily as well as clonidine 0.1 mg twice a day for hypertension.    Liana Cervantes, APRN  8/28/2019

## 2019-08-28 NOTE — PROGRESS NOTES
Pt concerned 5fu bulb not infusing via PICC line. Brissa Eastman, RN noticed PICC line with crease noted. PICC also noted to have excellent blood return and flushed w/o difficulty. 5FU bulb given to pharmacy and confirmed 5FU med had been infusing to pt. Reviewed above with TOMASA Raymond. Orders received to reconnect 5FU bulb to PICC line. Bulb to be unhooked by  agency 8/29/19 at 1413. Reviewed with pt and pt's family and they agreed with plan

## 2019-08-29 RX ORDER — HYDROCODONE BITARTRATE AND ACETAMINOPHEN 7.5; 325 MG/1; MG/1
1 TABLET ORAL EVERY 6 HOURS PRN
Qty: 90 TABLET | Refills: 0 | Status: SHIPPED | OUTPATIENT
Start: 2019-08-29 | End: 2019-10-02 | Stop reason: SDUPTHER

## 2019-09-08 RX ORDER — SODIUM CHLORIDE 9 MG/ML
250 INJECTION, SOLUTION INTRAVENOUS ONCE
Status: CANCELLED | OUTPATIENT
Start: 2019-09-10

## 2019-09-08 RX ORDER — PALONOSETRON 0.05 MG/ML
0.25 INJECTION, SOLUTION INTRAVENOUS ONCE
Status: CANCELLED | OUTPATIENT
Start: 2019-09-10

## 2019-09-08 RX ORDER — ATROPINE SULFATE 1 MG/ML
0.25 INJECTION, SOLUTION INTRAMUSCULAR; INTRAVENOUS; SUBCUTANEOUS
Status: CANCELLED | OUTPATIENT
Start: 2019-09-10

## 2019-09-08 RX ORDER — FLUOROURACIL 50 MG/ML
300 INJECTION, SOLUTION INTRAVENOUS ONCE
Status: CANCELLED | OUTPATIENT
Start: 2019-09-10

## 2019-09-08 NOTE — PROGRESS NOTES
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     HISTORY OF PRESENT ILLNESS: The patient is a very pleasant 79 y.o. female  with past medical history significant for colon cancer diagnosed 07/04/18. The patient presented to [facility] for gastritis and right lower quadrant pain.  This has been going on for the last few months associated with nausea but no vomiting.  She's been having one of dark stools.  Never had this problem before.  She had previous negative colonoscopy.  Colon polyp was found and biopsied. Surgical pathology 07/04/18 revealed invasive poorly differentiated adenocarcinoma in ascending colon. CT abdomen and pelvis 07/06/18 showed mass within the ascending colon with large mercy mass in the adjacent mesentery and prominent nodes around the cecum. The patient presented to Dr Juarez for elective right hemicolectomy on 08/01/18. Pathology 08/01/18 from colon and terminal ileum resection revealed poorly differentiated colonic adenocarcinoma ([id number]) with metastatic adenocarcinoma to 1 of 25 lymph nodes.  She was started on adjuvant chemotherapy using oxaliplatin with Xeloda September 25, 2018.  The patient completed 5 cycles and then decide to stop secondary to multiple side effects.  Repeated scans done on March 16, 2019 revealed new liver metastases.  She was started on palliative chemotherapy with FOLFIRI plus Avastin March 25, 2019.  The patient is here today for follow up visit was cycle #12.        SUBJECTIVE: The patient is here today with her niece. She has been doing fairly well. She has had no issues with nausea or vomiting. She is eating well. Her bowels are working well. She denies cough or shortness of breath. Her sore throat and mouth sores have been better since decreasing her dose of 5FU. Her biggest complaint is of fatigue and weakness.      PAST MEDICAL HISTORY/SOCIAL  HISTORY/FAMILY HISTORY: Reviewed by me and unchanged from my documentation done on 09/17/18.    Review of Systems   Constitutional: Positive for fatigue. Negative for activity change, appetite change, chills, fever and unexpected weight change.   HENT: Negative for congestion, hearing loss, mouth sores, nosebleeds, postnasal drip, sore throat and trouble swallowing.    Eyes: Negative for visual disturbance.   Respiratory: Negative for cough, chest tightness, shortness of breath and wheezing.    Cardiovascular: Negative for chest pain, palpitations and leg swelling.        Elevated blood pressure   Gastrointestinal: Negative for abdominal distention, abdominal pain, blood in stool, constipation, diarrhea, nausea, rectal pain and vomiting.   Endocrine: Negative for cold intolerance and heat intolerance.   Genitourinary: Negative for difficulty urinating, dysuria, frequency and urgency.   Musculoskeletal: Positive for arthralgias and gait problem. Negative for back pain, joint swelling and myalgias.   Skin: Negative for rash.   Neurological: Positive for weakness. Negative for dizziness, tremors, syncope, light-headedness, numbness and headaches.   Hematological: Negative for adenopathy. Does not bruise/bleed easily.   Psychiatric/Behavioral: Negative for confusion, sleep disturbance and suicidal ideas. The patient is nervous/anxious.          Current Outpatient Medications:   •  atenolol (TENORMIN) 50 MG tablet, Take 50 mg by mouth Daily., Disp: , Rfl:   •  cephalexin (KEFLEX) 500 MG capsule, Take 1 capsule by mouth 3 (Three) Times a Day., Disp: 21 capsule, Rfl: 0  •  CloNIDine (CATAPRES) 0.1 MG tablet, , Disp: , Rfl:   •  HYDROcodone-acetaminophen (NORCO) 7.5-325 MG per tablet, Take 1 tablet by mouth Every 6 (Six) Hours As Needed for Moderate Pain ., Disp: 90 tablet, Rfl: 0  •  lidocaine-prilocaine (EMLA) 2.5-2.5 % cream, Apply  topically to the appropriate area as directed As Needed (45-60 minutes prior to port  access.  Cover with saran/plastic wrap.)., Disp: 30 g, Rfl: 3  •  megestrol (MEGACE) 40 MG/ML suspension, Take 20 mL by mouth Daily., Disp: 480 mL, Rfl: 5  •  methylPREDNISolone (MEDROL, ROSEMARY,) 4 MG tablet, Take as directed on package instructions., Disp: 21 tablet, Rfl: 0  •  nystatin susp + lidocaine viscous (MAGIC MOUTHWASH) oral suspension, 5-10 ml swish and spit or swallow QID prn, Disp: 240 mL, Rfl: 3  •  ondansetron (ZOFRAN) 8 MG tablet, Take 1 tablet by mouth Every 8 (Eight) Hours As Needed for Nausea., Disp: 60 tablet, Rfl: 5  •  raNITIdine (ZANTAC) 150 MG tablet, Take 150 mg by mouth As Needed for Heartburn., Disp: , Rfl:     PHYSICAL EXAMINATION:   There were no vitals taken for this visit.   ECOG Performance Status: 1 - Symptomatic but completely ambulatory  General Appearance:  alert, cooperative, no apparent distress and appears stated age   Neurologic/Psychiatric: A&O x 3, gait steady, appropriate affect, strength 5/5 in all muscle groups   HEENT:  Normocephalic, without obvious abnormality, mucous membranes moist, mild erythema noted to throat with tender cervical adenopathy on the left, no coating to tongue, no ulceration, mild erythema noted to oropharynx.    Neck: Supple, symmetrical, trachea midline, no adenopathy;  No thyromegaly, masses, or tenderness   Lungs:   Clear to auscultation bilaterally; respirations regular, even, and unlabored bilaterally   Heart:  Regular rate and rhythm, no murmurs appreciated   Abdomen:   Soft, non-tender, non-distended and no organomegaly   Lymph nodes: No cervical, supraclavicular, inguinal or axillary adenopathy noted   Extremities: Normal, atraumatic; no clubbing, cyanosis, or edema, ambulating with cane   Skin: No rashes, ulcers, or suspicious lesions noted.      Hospital Outpatient Visit on 08/27/2019   Component Date Value Ref Range Status   • CEA 08/27/2019 6.01  ng/mL Final   • Glucose 08/27/2019 85  65 - 99 mg/dL Final   • BUN 08/27/2019 12  8 - 23 mg/dL  Final   • Creatinine 08/27/2019 0.79  0.57 - 1.00 mg/dL Final   • Sodium 08/27/2019 134* 136 - 145 mmol/L Final   • Potassium 08/27/2019 4.4  3.5 - 5.2 mmol/L Final   • Chloride 08/27/2019 100  98 - 107 mmol/L Final   • CO2 08/27/2019 22.0  22.0 - 29.0 mmol/L Final   • Calcium 08/27/2019 9.0  8.6 - 10.5 mg/dL Final   • Total Protein 08/27/2019 7.4  6.0 - 8.5 g/dL Final   • Albumin 08/27/2019 4.10  3.50 - 5.20 g/dL Final   • ALT (SGPT) 08/27/2019 18  1 - 33 U/L Final   • AST (SGOT) 08/27/2019 23  1 - 32 U/L Final   • Alkaline Phosphatase 08/27/2019 95  39 - 117 U/L Final   • Total Bilirubin 08/27/2019 0.5  0.2 - 1.2 mg/dL Final   • eGFR Non African Amer 08/27/2019 70  >60 mL/min/1.73 Final   • Globulin 08/27/2019 3.3  gm/dL Final   • A/G Ratio 08/27/2019 1.2  g/dL Final   • BUN/Creatinine Ratio 08/27/2019 15.2  7.0 - 25.0 Final   • Anion Gap 08/27/2019 12.0  5.0 - 15.0 mmol/L Final   • Color, UA 08/27/2019 Yellow  Yellow, Straw Final   • Appearance, UA 08/27/2019 Clear  Clear Final   • pH, UA 08/27/2019 5.0  5.0 - 8.0 Final   • Specific Gravity, UA 08/27/2019 1.005  1.005 - 1.030 Final   • Glucose, UA 08/27/2019 Negative  Negative Final   • Ketones, UA 08/27/2019 Negative  Negative Final   • Bilirubin, UA 08/27/2019 Negative  Negative Final   • Blood, UA 08/27/2019 Trace* Negative Final   • Protein, UA 08/27/2019 Negative  Negative Final   • Leuk Esterase, UA 08/27/2019 Negative  Negative Final   • Nitrite, UA 08/27/2019 Negative  Negative Final   • Urobilinogen, UA 08/27/2019 0.2 E.U./dL  0.2 - 1.0 E.U./dL Final   • WBC 08/27/2019 13.10* 3.40 - 10.80 10*3/mm3 Final   • RBC 08/27/2019 3.86  3.77 - 5.28 10*6/mm3 Final   • Hemoglobin 08/27/2019 12.1  12.0 - 15.9 g/dL Final   • Hematocrit 08/27/2019 36.4  34.0 - 46.6 % Final   • RDW 08/27/2019 17.5* 12.3 - 15.4 % Final   • MCV 08/27/2019 94.3  79.0 - 97.0 fL Final   • MCH 08/27/2019 31.4  26.6 - 33.0 pg Final   • MCHC 08/27/2019 33.3  31.5 - 35.7 g/dL Final   • MPV  08/27/2019 6.6  6.0 - 12.0 fL Final   • Platelets 08/27/2019 231  140 - 450 10*3/mm3 Final   • Neutrophil % 08/27/2019 31.7* 42.7 - 76.0 % Final   • Lymphocyte % 08/27/2019 58.9* 19.6 - 45.3 % Final   • Monocyte % 08/27/2019 9.4  5.0 - 12.0 % Final   • Neutrophils, Absolute 08/27/2019 4.20  1.70 - 7.00 10*3/mm3 Final   • Lymphocytes, Absolute 08/27/2019 7.70* 0.70 - 3.10 10*3/mm3 Final   • Monocytes, Absolute 08/27/2019 1.20* 0.10 - 0.90 10*3/mm3 Final        Ct Chest With Contrast    Result Date: 8/27/2019  Narrative: EXAMINATION: CT CHEST WITH CONTRAST-08/27/2019:  INDICATION: Restaging colorectal cancer; C18.9-Malignant neoplasm of colon, unspecified.  TECHNIQUE: CT scan of the chest was performed following intravenous contrast.  The radiation dose reduction device was turned on for each scan per the ALARA (As Low as Reasonably Achievable) protocol.  COMPARISON: 05/29/2019.  FINDINGS: There is no axillary lymphadenopathy. There are small stable mediastinal nodes. There is no hilar adenopathy. There is no pericardial or pleural effusion. Images displayed at lung window settings demonstrate no acute inflammatory process.  There is minimal chronic airspace disease in the lingula and in the right lower lobe posteriorly.      Impression: Stable chronic pulmonary findings. There are some small mediastinal nodes which are not abnormal based on size criteria and there are areas of minimal bibasilar airspace disease in the right lower lobe and lingula. All findings are stable and unchanged.  D:  08/27/2019 E:  08/27/2019    This report was finalized on 8/27/2019 4:51 PM by Dr. Adonay Reese MD.      Ct Abdomen Pelvis With Contrast    Result Date: 8/27/2019  Narrative: EXAMINATION: CT ABDOMEN AND PELVIS WITH CONTRAST-08/27/2019:  INDICATION: Restaging colorectal cancer; C18.9-Malignant neoplasm of colon, unspecified, followup rectal malignancy.  TECHNIQUE:  Multiple axial CT imaging was obtained of the abdomen and pelvis  from the lung bases through the pubic symphysis following the administration of intravenous contrast.  The radiation dose reduction device was turned on for each scan per the ALARA (As Low as Reasonably Achievable) protocol.  COMPARISON: 05/29/2019.  FINDINGS:  ABDOMEN: There is a nodular density identified within the right lung base. There are multiple lesions identified within the liver suggesting hepatic metastatic disease. The lesions when compared to the prior study may be slightly smaller on today's examination for example, the largest lesion posteriorly within the right lobe of the liver today measures 3.1 cm and previously measured 3.5 cm. There are no new lesions identified. The liver is homogeneous. The kidneys reveal cyst seen in the left kidney. There is also a cyst seen inferiorly within the right kidney. Both adrenal glands are within normal limits. The pancreas is homogeneous. Vascular calcification seen within the abdominal aorta and iliac vessels. Diverticulosis of the colon without evidence of diverticulitis.  PELVIS: The pelvic organs are unremarkable. The pelvic portions of the gastrointestinal tract are within normal limits. No free fluid or free air.  Delayed imaging reveals contrast seen in the renal collecting systems bilaterally as well as within the ureters and bladder with no evidence of obstruction. The bony structures reveal multilevel degenerative changes seen within the spine and pelvis.      Impression: Slight decrease seen in size of the lesions throughout the liver. There is no other change seen in the abdomen or pelvis in the interval. No other disease is identified. There is continued improvement in the interval.  D:  08/27/2019 E:  08/27/2019  This report was finalized on 8/27/2019 5:16 PM by Dr. Eden Grant MD.        ASSESSMENT: The patient is a very pleasant 79 y.o. female  with poorly differentiated ascending colon adenocarcinoma T3N1M0 stage IIIA:    PROBLEM LIST:    1.  Ascending colon adenocarcinoma T3 N1 M0 stage IIIa:  A.  Presenting with abdominal pain and irregular bowel movements  B.  Diagnosed after colonoscopy with a biopsy done on June 27, 2018  C.  Status post right hemicolectomy done by Dr. Wong August 01, 2018  D.  Final pathology revealed with 1 out of 25 positive mesenteric lymph nodes  E. Started adjuvant chemotherapy using Xeloda with oxaliplatin September 25, 2018, status post 5 cycles  F. CEA 17.8 March 25, 2019  G.  Progressive disease with diffuse liver metastases dominant CAT scan done on March 2019  H. Started on palliative chemotherapy with FOLFIRI plus Avastin March 25, 2019, status post 11 cycles.  I.  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  2.  Hypertension  3.  Normocytic anemia  4.  Chemotherapy-induced mucositis  5.  Chemotherapy-induced nausea  6. Right hip pain  7. Port cellulitis- s/p port removal 7/19/2019.     PLAN:   1. I will proceed with palliative chemotherapy with FOLFIRI plus Avastin as scheduled today, cycle #12. We will continue 25% dose reduction on her 5-FU secondary to mucositis.   2.   I will continue to monitor the patient's blood work including blood counts, kidney function, liver functions, and electrolytes.  We will also check CEA on day 1 of each cycle. Her CEA was essentially stable with her last treatment at 6.01.   3.  We will continue routine PICC line care once weekly through home health.   4. I discussed ongoing options for treatment including continuing current regimen until progression or unacceptable toxicity versus changing to maintenance treatment using 5-FU and Avastin alone. We will change her treatment from once every 2 weeks to once every 3 weeks secondary to fatigue, weakness, and long distance for travel.   5.  The patient had  microsatellite instability identified on gene analysis. We will consider immunotherapy in the future if needed for progression on current second line therapy.   6. She will continue Norco 7.5/325 mg every 6 hours as needed for hip pain.   7.  We reviewed again potential side effects of chemotherapy including diarrhea, colitis, low blood counts, infusion reactions, dehydration, infection, and potential death.  8.  She will continue  Zofran as needed for chemotherapy induced-nausea.  9.  She will follow-up with us in 2 weeks for cycle #12.  10.  The patient will need 3 month follow up CAT scans which will be due November 27, 2019.  11.  We will continue atenolol 50 mg daily as well as clonidine 0.1 mg twice a day for hypertension.    Liana Cervantes, APRN  9/8/2019

## 2019-09-09 ENCOUNTER — HOSPITAL ENCOUNTER (OUTPATIENT)
Dept: ONCOLOGY | Facility: HOSPITAL | Age: 79
Setting detail: INFUSION SERIES
Discharge: HOME | End: 2019-09-09

## 2019-09-09 ENCOUNTER — OFFICE VISIT (OUTPATIENT)
Dept: ONCOLOGY | Facility: CLINIC | Age: 79
End: 2019-09-09

## 2019-09-09 VITALS
WEIGHT: 154 LBS | TEMPERATURE: 97.9 F | RESPIRATION RATE: 16 BRPM | SYSTOLIC BLOOD PRESSURE: 143 MMHG | HEART RATE: 59 BPM | OXYGEN SATURATION: 98 % | HEIGHT: 63 IN | DIASTOLIC BLOOD PRESSURE: 74 MMHG | BODY MASS INDEX: 27.29 KG/M2

## 2019-09-09 VITALS — SYSTOLIC BLOOD PRESSURE: 140 MMHG | DIASTOLIC BLOOD PRESSURE: 73 MMHG

## 2019-09-09 DIAGNOSIS — C18.9 MALIGNANT NEOPLASM OF COLON, UNSPECIFIED PART OF COLON (HCC): Primary | ICD-10-CM

## 2019-09-09 DIAGNOSIS — R82.90 CLOUDY URINE: Primary | ICD-10-CM

## 2019-09-09 DIAGNOSIS — C18.9 MALIGNANT NEOPLASM OF COLON, UNSPECIFIED PART OF COLON (HCC): ICD-10-CM

## 2019-09-09 LAB
ALBUMIN SERPL-MCNC: 3.7 G/DL (ref 3.5–5.2)
ALBUMIN/GLOB SERPL: 1 G/DL
ALP SERPL-CCNC: 88 U/L (ref 39–117)
ALT SERPL W P-5'-P-CCNC: 12 U/L (ref 1–33)
ANION GAP SERPL CALCULATED.3IONS-SCNC: 13 MMOL/L (ref 5–15)
AST SERPL-CCNC: 18 U/L (ref 1–32)
BILIRUB SERPL-MCNC: 0.3 MG/DL (ref 0.2–1.2)
BILIRUB UR QL STRIP: NEGATIVE
BUN BLD-MCNC: 11 MG/DL (ref 8–23)
BUN/CREAT SERPL: 12.4 (ref 7–25)
CALCIUM SPEC-SCNC: 9.2 MG/DL (ref 8.6–10.5)
CEA SERPL-MCNC: 5.19 NG/ML
CHLORIDE SERPL-SCNC: 102 MMOL/L (ref 98–107)
CLARITY UR: ABNORMAL
CO2 SERPL-SCNC: 20 MMOL/L (ref 22–29)
COLOR UR: YELLOW
CREAT BLD-MCNC: 0.89 MG/DL (ref 0.57–1)
ERYTHROCYTE [DISTWIDTH] IN BLOOD BY AUTOMATED COUNT: 17.2 % (ref 12.3–15.4)
GFR SERPL CREATININE-BSD FRML MDRD: 61 ML/MIN/1.73
GLOBULIN UR ELPH-MCNC: 3.6 GM/DL
GLUCOSE BLD-MCNC: 80 MG/DL (ref 65–99)
GLUCOSE UR STRIP-MCNC: NEGATIVE MG/DL
HCT VFR BLD AUTO: 33.2 % (ref 34–46.6)
HGB BLD-MCNC: 11.4 G/DL (ref 12–15.9)
HGB UR QL STRIP.AUTO: ABNORMAL
KETONES UR QL STRIP: NEGATIVE
LEUKOCYTE ESTERASE UR QL STRIP.AUTO: ABNORMAL
LYMPHOCYTES # BLD AUTO: 5.2 10*3/MM3 (ref 0.7–3.1)
LYMPHOCYTES NFR BLD AUTO: 57.6 % (ref 19.6–45.3)
MCH RBC QN AUTO: 31.8 PG (ref 26.6–33)
MCHC RBC AUTO-ENTMCNC: 34.2 G/DL (ref 31.5–35.7)
MCV RBC AUTO: 92.8 FL (ref 79–97)
MONOCYTES # BLD AUTO: 0.7 10*3/MM3 (ref 0.1–0.9)
MONOCYTES NFR BLD AUTO: 7.5 % (ref 5–12)
NEUTROPHILS # BLD AUTO: 3.2 10*3/MM3 (ref 1.7–7)
NEUTROPHILS NFR BLD AUTO: 34.9 % (ref 42.7–76)
NITRITE UR QL STRIP: POSITIVE
PH UR STRIP.AUTO: 6 [PH] (ref 5–8)
PLATELET # BLD AUTO: 181 10*3/MM3 (ref 140–450)
PMV BLD AUTO: 6.9 FL (ref 6–12)
POTASSIUM BLD-SCNC: 4.1 MMOL/L (ref 3.5–5.2)
PROT SERPL-MCNC: 7.3 G/DL (ref 6–8.5)
PROT UR QL STRIP: ABNORMAL
RBC # BLD AUTO: 3.58 10*6/MM3 (ref 3.77–5.28)
SODIUM BLD-SCNC: 135 MMOL/L (ref 136–145)
SP GR UR STRIP: 1.02 (ref 1–1.03)
UROBILINOGEN UR QL STRIP: ABNORMAL
WBC NRBC COR # BLD: 9.1 10*3/MM3 (ref 3.4–10.8)

## 2019-09-09 PROCEDURE — 87186 SC STD MICRODIL/AGAR DIL: CPT

## 2019-09-09 PROCEDURE — 81003 URINALYSIS AUTO W/O SCOPE: CPT | Performed by: NURSE PRACTITIONER

## 2019-09-09 PROCEDURE — 96416 CHEMO PROLONG INFUSE W/PUMP: CPT

## 2019-09-09 PROCEDURE — 96415 CHEMO IV INFUSION ADDL HR: CPT

## 2019-09-09 PROCEDURE — 96411 CHEMO IV PUSH ADDL DRUG: CPT

## 2019-09-09 PROCEDURE — 25010000002 ATROPINE PER 0.01 MG: Performed by: NURSE PRACTITIONER

## 2019-09-09 PROCEDURE — 87088 URINE BACTERIA CULTURE: CPT

## 2019-09-09 PROCEDURE — 25010000002 PALONOSETRON 0.25 MG/5ML SOLUTION PREFILLED SYRINGE: Performed by: NURSE PRACTITIONER

## 2019-09-09 PROCEDURE — 80053 COMPREHEN METABOLIC PANEL: CPT | Performed by: NURSE PRACTITIONER

## 2019-09-09 PROCEDURE — 25010000002 FLUOROURACIL PER 500 MG: Performed by: NURSE PRACTITIONER

## 2019-09-09 PROCEDURE — 25010000002 LEUCOVORIN 500 MG RECONSTITUTED SOLUTION 1 EACH VIAL: Performed by: NURSE PRACTITIONER

## 2019-09-09 PROCEDURE — 87086 URINE CULTURE/COLONY COUNT: CPT

## 2019-09-09 PROCEDURE — 99214 OFFICE O/P EST MOD 30 MIN: CPT | Performed by: NURSE PRACTITIONER

## 2019-09-09 PROCEDURE — 25010000002 BEVACIZUMAB PER 10 MG: Performed by: NURSE PRACTITIONER

## 2019-09-09 PROCEDURE — 96368 THER/DIAG CONCURRENT INF: CPT

## 2019-09-09 PROCEDURE — 96375 TX/PRO/DX INJ NEW DRUG ADDON: CPT

## 2019-09-09 PROCEDURE — 25010000002 DEXAMETHASONE PER 1 MG: Performed by: NURSE PRACTITIONER

## 2019-09-09 PROCEDURE — 25010000002 IRINOTECAN PER 20 MG: Performed by: NURSE PRACTITIONER

## 2019-09-09 PROCEDURE — 82378 CARCINOEMBRYONIC ANTIGEN: CPT | Performed by: NURSE PRACTITIONER

## 2019-09-09 PROCEDURE — 85025 COMPLETE CBC W/AUTO DIFF WBC: CPT | Performed by: NURSE PRACTITIONER

## 2019-09-09 PROCEDURE — 96413 CHEMO IV INFUSION 1 HR: CPT

## 2019-09-09 PROCEDURE — 96417 CHEMO IV INFUS EACH ADDL SEQ: CPT

## 2019-09-09 RX ORDER — SODIUM CHLORIDE 9 MG/ML
250 INJECTION, SOLUTION INTRAVENOUS ONCE
Status: COMPLETED | OUTPATIENT
Start: 2019-09-09 | End: 2019-09-09

## 2019-09-09 RX ORDER — ATROPINE SULFATE 1 MG/ML
0.25 INJECTION, SOLUTION INTRAMUSCULAR; INTRAVENOUS; SUBCUTANEOUS
Status: DISCONTINUED | OUTPATIENT
Start: 2019-09-09 | End: 2019-09-10 | Stop reason: HOSPADM

## 2019-09-09 RX ORDER — FLUOROURACIL 50 MG/ML
300 INJECTION, SOLUTION INTRAVENOUS ONCE
Status: COMPLETED | OUTPATIENT
Start: 2019-09-09 | End: 2019-09-09

## 2019-09-09 RX ORDER — PALONOSETRON 0.05 MG/ML
0.25 INJECTION, SOLUTION INTRAVENOUS ONCE
Status: COMPLETED | OUTPATIENT
Start: 2019-09-09 | End: 2019-09-09

## 2019-09-09 RX ADMIN — LEUCOVORIN CALCIUM 490 MG: 500 INJECTION, POWDER, LYOPHILIZED, FOR SOLUTION INTRAMUSCULAR; INTRAVENOUS at 10:54

## 2019-09-09 RX ADMIN — PALONOSETRON 0.25 MG: 0.25 INJECTION, SOLUTION INTRAVENOUS at 10:01

## 2019-09-09 RX ADMIN — FLUOROURACIL 3000 MG: 50 INJECTION, SOLUTION INTRAVENOUS at 12:43

## 2019-09-09 RX ADMIN — DEXAMETHASONE SODIUM PHOSPHATE 12 MG: 4 INJECTION, SOLUTION INTRAMUSCULAR; INTRAVENOUS at 10:03

## 2019-09-09 RX ADMIN — FLUOROURACIL 490 MG: 50 INJECTION, SOLUTION INTRAVENOUS at 12:39

## 2019-09-09 RX ADMIN — IRINOTECAN HYDROCHLORIDE 300 MG: 20 INJECTION, SOLUTION INTRAVENOUS at 10:54

## 2019-09-09 RX ADMIN — ATROPINE SULFATE 0.25 MG: 1 INJECTION, SOLUTION INTRAMUSCULAR; INTRAVENOUS; SUBCUTANEOUS at 10:50

## 2019-09-09 RX ADMIN — BEVACIZUMAB 310 MG: 400 INJECTION, SOLUTION INTRAVENOUS at 10:16

## 2019-09-09 RX ADMIN — SODIUM CHLORIDE 250 ML: 9 INJECTION, SOLUTION INTRAVENOUS at 10:04

## 2019-09-10 ENCOUNTER — TELEPHONE (OUTPATIENT)
Dept: ONCOLOGY | Facility: CLINIC | Age: 79
End: 2019-09-10

## 2019-09-10 RX ORDER — CEFUROXIME AXETIL 250 MG/1
250 TABLET ORAL 2 TIMES DAILY
Qty: 10 TABLET | Refills: 0 | Status: SHIPPED | OUTPATIENT
Start: 2019-09-10 | End: 2019-10-22

## 2019-09-10 NOTE — TELEPHONE ENCOUNTER
LORI for patient to call regarding UA results showing UTI. RX sent in to patient pharmacy for ceftin 250 mg twice a day for 5 days for treatment. I asked her to return our call with any worsening symptoms, fevers, chills, dysuria, or hematuria.

## 2019-09-11 ENCOUNTER — TELEPHONE (OUTPATIENT)
Dept: ONCOLOGY | Facility: CLINIC | Age: 79
End: 2019-09-11

## 2019-09-11 LAB — BACTERIA SPEC AEROBE CULT: ABNORMAL

## 2019-09-11 RX ORDER — LEVOFLOXACIN 500 MG/1
500 TABLET, FILM COATED ORAL DAILY
Qty: 5 TABLET | Refills: 0 | Status: SHIPPED | OUTPATIENT
Start: 2019-09-11 | End: 2019-10-22

## 2019-09-11 NOTE — TELEPHONE ENCOUNTER
----- Message from MARVIN Galvan sent at 9/11/2019 12:47 PM EDT -----  I started the patient on ceftin for her UTI, however her sensitivity looks like it is resistant. Please change her to Levaquin 500 mg for 5 days and let her know. Thanks!    ----- Message -----  From: Liana Cervantes APRN  Sent: 9/9/2019   9:12 AM  To: MARVIN Galvan    F/U on urine culture.

## 2019-09-11 NOTE — TELEPHONE ENCOUNTER
Called to notify that UA resulted to what medication it is sensitive to.  Liana has changed antibiotic to Levaquin 500 mg daily x 5 days.

## 2019-09-19 ENCOUNTER — TELEPHONE (OUTPATIENT)
Dept: ONCOLOGY | Facility: CLINIC | Age: 79
End: 2019-09-19

## 2019-09-19 NOTE — TELEPHONE ENCOUNTER
----- Message from Hui Palacios sent at 9/19/2019  8:56 AM EDT -----  Regarding: [facility]  Contact: [phone]  Lisa with [facility] called she was returning Judie's phone call. Please call back.

## 2019-09-19 NOTE — TELEPHONE ENCOUNTER
Returned call to Lisa with [facility] related to patient's recent UTI. Patient is being recerted by home health for PICC care.

## 2019-10-01 ENCOUNTER — OFFICE VISIT (OUTPATIENT)
Dept: ONCOLOGY | Facility: CLINIC | Age: 79
End: 2019-10-01

## 2019-10-01 ENCOUNTER — HOSPITAL ENCOUNTER (OUTPATIENT)
Dept: ONCOLOGY | Facility: HOSPITAL | Age: 79
Setting detail: INFUSION SERIES
Discharge: HOME | End: 2019-10-01

## 2019-10-01 VITALS
HEART RATE: 61 BPM | SYSTOLIC BLOOD PRESSURE: 188 MMHG | WEIGHT: 156 LBS | TEMPERATURE: 97.8 F | HEIGHT: 63 IN | BODY MASS INDEX: 27.64 KG/M2 | RESPIRATION RATE: 16 BRPM | OXYGEN SATURATION: 98 % | DIASTOLIC BLOOD PRESSURE: 84 MMHG

## 2019-10-01 VITALS — DIASTOLIC BLOOD PRESSURE: 71 MMHG | SYSTOLIC BLOOD PRESSURE: 149 MMHG

## 2019-10-01 DIAGNOSIS — C18.9 MALIGNANT NEOPLASM OF COLON, UNSPECIFIED PART OF COLON (HCC): Primary | ICD-10-CM

## 2019-10-01 LAB
ALBUMIN SERPL-MCNC: 4.1 G/DL (ref 3.5–5.2)
ALBUMIN/GLOB SERPL: 1.2 G/DL
ALP SERPL-CCNC: 83 U/L (ref 39–117)
ALT SERPL W P-5'-P-CCNC: 11 U/L (ref 1–33)
ANION GAP SERPL CALCULATED.3IONS-SCNC: 13 MMOL/L (ref 5–15)
AST SERPL-CCNC: 18 U/L (ref 1–32)
BILIRUB SERPL-MCNC: 0.5 MG/DL (ref 0.2–1.2)
BILIRUB UR QL STRIP: ABNORMAL
BUN BLD-MCNC: 12 MG/DL (ref 8–23)
BUN/CREAT SERPL: 13.8 (ref 7–25)
CALCIUM SPEC-SCNC: 9.2 MG/DL (ref 8.6–10.5)
CEA SERPL-MCNC: 5.03 NG/ML
CHLORIDE SERPL-SCNC: 102 MMOL/L (ref 98–107)
CLARITY UR: ABNORMAL
CO2 SERPL-SCNC: 22 MMOL/L (ref 22–29)
COLOR UR: ABNORMAL
CREAT BLD-MCNC: 0.87 MG/DL (ref 0.57–1)
ERYTHROCYTE [DISTWIDTH] IN BLOOD BY AUTOMATED COUNT: 18.2 % (ref 12.3–15.4)
GFR SERPL CREATININE-BSD FRML MDRD: 63 ML/MIN/1.73
GLOBULIN UR ELPH-MCNC: 3.4 GM/DL
GLUCOSE BLD-MCNC: 88 MG/DL (ref 65–99)
GLUCOSE UR STRIP-MCNC: NEGATIVE MG/DL
HCT VFR BLD AUTO: 35.6 % (ref 34–46.6)
HGB BLD-MCNC: 11.6 G/DL (ref 12–15.9)
HGB UR QL STRIP.AUTO: ABNORMAL
KETONES UR QL STRIP: NEGATIVE
LEUKOCYTE ESTERASE UR QL STRIP.AUTO: ABNORMAL
LYMPHOCYTES # BLD AUTO: 6.1 10*3/MM3 (ref 0.7–3.1)
LYMPHOCYTES NFR BLD AUTO: 51.1 % (ref 19.6–45.3)
MCH RBC QN AUTO: 31 PG (ref 26.6–33)
MCHC RBC AUTO-ENTMCNC: 32.6 G/DL (ref 31.5–35.7)
MCV RBC AUTO: 95.2 FL (ref 79–97)
MONOCYTES # BLD AUTO: 1.5 10*3/MM3 (ref 0.1–0.9)
MONOCYTES NFR BLD AUTO: 12.4 % (ref 5–12)
NEUTROPHILS # BLD AUTO: 4.4 10*3/MM3 (ref 1.7–7)
NEUTROPHILS NFR BLD AUTO: 36.5 % (ref 42.7–76)
NITRITE UR QL STRIP: NEGATIVE
PH UR STRIP.AUTO: 6 [PH] (ref 5–8)
PLATELET # BLD AUTO: 173 10*3/MM3 (ref 140–450)
PMV BLD AUTO: 7 FL (ref 6–12)
POTASSIUM BLD-SCNC: 3.9 MMOL/L (ref 3.5–5.2)
PROT SERPL-MCNC: 7.5 G/DL (ref 6–8.5)
PROT UR QL STRIP: ABNORMAL
RBC # BLD AUTO: 3.73 10*6/MM3 (ref 3.77–5.28)
SODIUM BLD-SCNC: 137 MMOL/L (ref 136–145)
SP GR UR STRIP: 1.02 (ref 1–1.03)
UROBILINOGEN UR QL STRIP: ABNORMAL
WBC NRBC COR # BLD: 12 10*3/MM3 (ref 3.4–10.8)

## 2019-10-01 PROCEDURE — 25010000002 DEXAMETHASONE PER 1 MG: Performed by: NURSE PRACTITIONER

## 2019-10-01 PROCEDURE — 25010000002 IRINOTECAN PER 20 MG: Performed by: NURSE PRACTITIONER

## 2019-10-01 PROCEDURE — 96368 THER/DIAG CONCURRENT INF: CPT

## 2019-10-01 PROCEDURE — 96413 CHEMO IV INFUSION 1 HR: CPT

## 2019-10-01 PROCEDURE — 85025 COMPLETE CBC W/AUTO DIFF WBC: CPT | Performed by: NURSE PRACTITIONER

## 2019-10-01 PROCEDURE — 99215 OFFICE O/P EST HI 40 MIN: CPT | Performed by: INTERNAL MEDICINE

## 2019-10-01 PROCEDURE — 96415 CHEMO IV INFUSION ADDL HR: CPT

## 2019-10-01 PROCEDURE — 25010000002 LEUCOVORIN CALCIUM PER 50 MG: Performed by: NURSE PRACTITIONER

## 2019-10-01 PROCEDURE — 25010000002 PALONOSETRON PER 25 MCG: Performed by: NURSE PRACTITIONER

## 2019-10-01 PROCEDURE — 81003 URINALYSIS AUTO W/O SCOPE: CPT | Performed by: NURSE PRACTITIONER

## 2019-10-01 PROCEDURE — 96416 CHEMO PROLONG INFUSE W/PUMP: CPT

## 2019-10-01 PROCEDURE — 82378 CARCINOEMBRYONIC ANTIGEN: CPT | Performed by: NURSE PRACTITIONER

## 2019-10-01 PROCEDURE — 25010000002 LEUCOVORIN 500 MG RECONSTITUTED SOLUTION 1 EACH VIAL: Performed by: NURSE PRACTITIONER

## 2019-10-01 PROCEDURE — 96375 TX/PRO/DX INJ NEW DRUG ADDON: CPT

## 2019-10-01 PROCEDURE — 25010000002 BEVACIZUMAB PER 10 MG: Performed by: NURSE PRACTITIONER

## 2019-10-01 PROCEDURE — 25010000002 FLUOROURACIL PER 500 MG: Performed by: NURSE PRACTITIONER

## 2019-10-01 PROCEDURE — 25010000002 ATROPINE PER 0.01 MG: Performed by: NURSE PRACTITIONER

## 2019-10-01 PROCEDURE — 96411 CHEMO IV PUSH ADDL DRUG: CPT

## 2019-10-01 PROCEDURE — 96417 CHEMO IV INFUS EACH ADDL SEQ: CPT

## 2019-10-01 PROCEDURE — 80053 COMPREHEN METABOLIC PANEL: CPT | Performed by: NURSE PRACTITIONER

## 2019-10-01 RX ORDER — PALONOSETRON 0.05 MG/ML
0.25 INJECTION, SOLUTION INTRAVENOUS ONCE
Status: CANCELLED | OUTPATIENT
Start: 2019-10-01

## 2019-10-01 RX ORDER — ATROPINE SULFATE 1 MG/ML
0.25 INJECTION, SOLUTION INTRAMUSCULAR; INTRAVENOUS; SUBCUTANEOUS
Status: DISCONTINUED | OUTPATIENT
Start: 2019-10-01 | End: 2019-10-02 | Stop reason: HOSPADM

## 2019-10-01 RX ORDER — SODIUM CHLORIDE 9 MG/ML
250 INJECTION, SOLUTION INTRAVENOUS ONCE
Status: CANCELLED | OUTPATIENT
Start: 2019-10-01

## 2019-10-01 RX ORDER — FLUOROURACIL 50 MG/ML
300 INJECTION, SOLUTION INTRAVENOUS ONCE
Status: COMPLETED | OUTPATIENT
Start: 2019-10-01 | End: 2019-10-01

## 2019-10-01 RX ORDER — PALONOSETRON 0.05 MG/ML
0.25 INJECTION, SOLUTION INTRAVENOUS ONCE
Status: COMPLETED | OUTPATIENT
Start: 2019-10-01 | End: 2019-10-01

## 2019-10-01 RX ORDER — FLUOROURACIL 50 MG/ML
300 INJECTION, SOLUTION INTRAVENOUS ONCE
Status: CANCELLED | OUTPATIENT
Start: 2019-10-01

## 2019-10-01 RX ORDER — SODIUM CHLORIDE 9 MG/ML
250 INJECTION, SOLUTION INTRAVENOUS ONCE
Status: COMPLETED | OUTPATIENT
Start: 2019-10-01 | End: 2019-10-01

## 2019-10-01 RX ORDER — ATROPINE SULFATE 1 MG/ML
0.25 INJECTION, SOLUTION INTRAMUSCULAR; INTRAVENOUS; SUBCUTANEOUS
Status: CANCELLED | OUTPATIENT
Start: 2019-10-01

## 2019-10-01 RX ADMIN — LEUCOVORIN CALCIUM 520 MG: 100 INJECTION, POWDER, LYOPHILIZED, FOR SUSPENSION INTRAMUSCULAR; INTRAVENOUS at 11:43

## 2019-10-01 RX ADMIN — BEVACIZUMAB 350 MG: 400 INJECTION, SOLUTION INTRAVENOUS at 11:01

## 2019-10-01 RX ADMIN — FLUOROURACIL 520 MG: 50 INJECTION, SOLUTION INTRAVENOUS at 13:43

## 2019-10-01 RX ADMIN — IRINOTECAN HYDROCHLORIDE 300 MG: 20 INJECTION, SOLUTION INTRAVENOUS at 11:43

## 2019-10-01 RX ADMIN — FLUOROURACIL 3000 MG: 50 INJECTION, SOLUTION INTRAVENOUS at 13:50

## 2019-10-01 RX ADMIN — DEXAMETHASONE SODIUM PHOSPHATE 12 MG: 4 INJECTION, SOLUTION INTRAMUSCULAR; INTRAVENOUS at 10:44

## 2019-10-01 RX ADMIN — ATROPINE SULFATE 0.25 MG: 1 INJECTION, SOLUTION INTRAMUSCULAR; INTRAVENOUS; SUBCUTANEOUS at 11:40

## 2019-10-01 RX ADMIN — SODIUM CHLORIDE 250 ML: 9 INJECTION, SOLUTION INTRAVENOUS at 10:36

## 2019-10-01 RX ADMIN — PALONOSETRON HYDROCHLORIDE 0.25 MG: 0.25 INJECTION, SOLUTION INTRAVENOUS at 10:36

## 2019-10-01 NOTE — PROGRESS NOTES
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     HISTORY OF PRESENT ILLNESS: The patient is a very pleasant 79 y.o. female  with past medical history significant for colon cancer diagnosed 07/04/18. The patient presented to [facility] for gastritis and right lower quadrant pain.  This has been going on for the last few months associated with nausea but no vomiting.  She's been having one of dark stools.  Never had this problem before.  She had previous negative colonoscopy.  Colon polyp was found and biopsied. Surgical pathology 07/04/18 revealed invasive poorly differentiated adenocarcinoma in ascending colon. CT abdomen and pelvis 07/06/18 showed mass within the ascending colon with large mercy mass in the adjacent mesentery and prominent nodes around the cecum. The patient presented to Dr Juarez for elective right hemicolectomy on 08/01/18. Pathology 08/01/18 from colon and terminal ileum resection revealed poorly differentiated colonic adenocarcinoma ([id number]) with metastatic adenocarcinoma to 1 of 25 lymph nodes.  She was started on adjuvant chemotherapy using oxaliplatin with Xeloda September 25, 2018.  The patient completed 5 cycles and then decide to stop secondary to multiple side effects.  Repeated scans done on March 16, 2019 revealed new liver metastases.  She was started on palliative chemotherapy with FOLFIRI plus Avastin March 25, 2019.  The patient is here today for follow up visit was cycle #13.        SUBJECTIVE: The patient is here today with her niece.  She is doing fairly well.  She is having mild nausea.  Her mouth sores has improved.  She is complaining of fatigue.    PAST MEDICAL HISTORY/SOCIAL HISTORY/FAMILY HISTORY: Reviewed by me and unchanged from my documentation done on 09/17/18.    Review of Systems   Constitutional: Positive for fatigue. Negative for activity change, appetite  change, chills, fever and unexpected weight change.   HENT: Negative for congestion, hearing loss, mouth sores, nosebleeds, postnasal drip, sore throat and trouble swallowing.    Eyes: Negative for visual disturbance.   Respiratory: Negative for cough, chest tightness, shortness of breath and wheezing.    Cardiovascular: Negative for chest pain, palpitations and leg swelling.        Elevated blood pressure   Gastrointestinal: Negative for abdominal distention, abdominal pain, blood in stool, constipation, diarrhea, nausea, rectal pain and vomiting.   Endocrine: Negative for cold intolerance and heat intolerance.   Genitourinary: Negative for difficulty urinating, dysuria, frequency and urgency.   Musculoskeletal: Positive for arthralgias and gait problem. Negative for back pain, joint swelling and myalgias.   Skin: Negative for rash.   Neurological: Positive for weakness. Negative for dizziness, tremors, syncope, light-headedness, numbness and headaches.   Hematological: Negative for adenopathy. Does not bruise/bleed easily.   Psychiatric/Behavioral: Negative for confusion, sleep disturbance and suicidal ideas. The patient is nervous/anxious.          Current Outpatient Medications:   •  atenolol (TENORMIN) 50 MG tablet, Take 50 mg by mouth Daily., Disp: , Rfl:   •  cefuroxime (CEFTIN) 250 MG tablet, Take 1 tablet by mouth 2 (Two) Times a Day., Disp: 10 tablet, Rfl: 0  •  CloNIDine (CATAPRES) 0.1 MG tablet, , Disp: , Rfl:   •  HYDROcodone-acetaminophen (NORCO) 7.5-325 MG per tablet, Take 1 tablet by mouth Every 6 (Six) Hours As Needed for Moderate Pain ., Disp: 90 tablet, Rfl: 0  •  levoFLOXacin (LEVAQUIN) 500 MG tablet, Take 1 tablet by mouth Daily., Disp: 5 tablet, Rfl: 0  •  lidocaine-prilocaine (EMLA) 2.5-2.5 % cream, Apply  topically to the appropriate area as directed As Needed (45-60 minutes prior to port access.  Cover with saran/plastic wrap.)., Disp: 30 g, Rfl: 3  •  megestrol (MEGACE) 40 MG/ML suspension,  "Take 20 mL by mouth Daily., Disp: 480 mL, Rfl: 5  •  methylPREDNISolone (MEDROL, ROSEMARY,) 4 MG tablet, Take as directed on package instructions., Disp: 21 tablet, Rfl: 0  •  nystatin susp + lidocaine viscous (MAGIC MOUTHWASH) oral suspension, 5-10 ml swish and spit or swallow QID prn, Disp: 240 mL, Rfl: 3  •  ondansetron (ZOFRAN) 8 MG tablet, Take 1 tablet by mouth Every 8 (Eight) Hours As Needed for Nausea., Disp: 60 tablet, Rfl: 5  •  raNITIdine (ZANTAC) 150 MG tablet, Take 150 mg by mouth As Needed for Heartburn., Disp: , Rfl:     PHYSICAL EXAMINATION:   BP (!) 188/84 Comment: RUE  Pulse 61   Temp 97.8 °F (36.6 °C) (Temporal)   Resp 16   Ht 160 cm (63\")   Wt 70.8 kg (156 lb)   SpO2 98% Comment: RA  BMI 27.63 kg/m²    ECOG Performance Status: 1 - Symptomatic but completely ambulatory  General Appearance:  alert, cooperative, no apparent distress and appears stated age   Neurologic/Psychiatric: A&O x 3, gait steady, appropriate affect, strength 5/5 in all muscle groups   HEENT:  Normocephalic, without obvious abnormality, mucous membranes moist, mild erythema noted to throat with tender cervical adenopathy on the left, no coating to tongue, no ulceration, mild erythema noted to oropharynx.    Neck: Supple, symmetrical, trachea midline, no adenopathy;  No thyromegaly, masses, or tenderness   Lungs:   Clear to auscultation bilaterally; respirations regular, even, and unlabored bilaterally   Heart:  Regular rate and rhythm, no murmurs appreciated   Abdomen:   Soft, non-tender, non-distended and no organomegaly   Lymph nodes: No cervical, supraclavicular, inguinal or axillary adenopathy noted   Extremities: Normal, atraumatic; no clubbing, cyanosis, or edema, ambulating with cane   Skin: No rashes, ulcers, or suspicious lesions noted.      No visits with results within 2 Week(s) from this visit.   Latest known visit with results is:   Hospital Outpatient Visit on 09/09/2019   Component Date Value Ref Range Status "   • CEA 09/09/2019 5.19  ng/mL Final   • Glucose 09/09/2019 80  65 - 99 mg/dL Final   • BUN 09/09/2019 11  8 - 23 mg/dL Final   • Creatinine 09/09/2019 0.89  0.57 - 1.00 mg/dL Final   • Sodium 09/09/2019 135* 136 - 145 mmol/L Final   • Potassium 09/09/2019 4.1  3.5 - 5.2 mmol/L Final   • Chloride 09/09/2019 102  98 - 107 mmol/L Final   • CO2 09/09/2019 20.0* 22.0 - 29.0 mmol/L Final   • Calcium 09/09/2019 9.2  8.6 - 10.5 mg/dL Final   • Total Protein 09/09/2019 7.3  6.0 - 8.5 g/dL Final   • Albumin 09/09/2019 3.70  3.50 - 5.20 g/dL Final   • ALT (SGPT) 09/09/2019 12  1 - 33 U/L Final   • AST (SGOT) 09/09/2019 18  1 - 32 U/L Final   • Alkaline Phosphatase 09/09/2019 88  39 - 117 U/L Final   • Total Bilirubin 09/09/2019 0.3  0.2 - 1.2 mg/dL Final   • eGFR Non African Amer 09/09/2019 61  >60 mL/min/1.73 Final   • Globulin 09/09/2019 3.6  gm/dL Final   • A/G Ratio 09/09/2019 1.0  g/dL Final   • BUN/Creatinine Ratio 09/09/2019 12.4  7.0 - 25.0 Final   • Anion Gap 09/09/2019 13.0  5.0 - 15.0 mmol/L Final   • Color, UA 09/09/2019 Yellow  Yellow, Straw Final   • Appearance, UA 09/09/2019 Turbid* Clear Final   • pH, UA 09/09/2019 6.0  5.0 - 8.0 Final   • Specific Gravity, UA 09/09/2019 1.020  1.005 - 1.030 Final   • Glucose, UA 09/09/2019 Negative  Negative Final   • Ketones, UA 09/09/2019 Negative  Negative Final   • Bilirubin, UA 09/09/2019 Negative  Negative Final   • Blood, UA 09/09/2019 Moderate (2+)* Negative Final   • Protein, UA 09/09/2019 Trace* Negative Final   • Leuk Esterase, UA 09/09/2019 Moderate (2+)* Negative Final   • Nitrite, UA 09/09/2019 Positive* Negative Final   • Urobilinogen, UA 09/09/2019 0.2 E.U./dL  0.2 - 1.0 E.U./dL Final   • WBC 09/09/2019 9.10  3.40 - 10.80 10*3/mm3 Final   • RBC 09/09/2019 3.58* 3.77 - 5.28 10*6/mm3 Final   • Hemoglobin 09/09/2019 11.4* 12.0 - 15.9 g/dL Final   • Hematocrit 09/09/2019 33.2* 34.0 - 46.6 % Final   • RDW 09/09/2019 17.2* 12.3 - 15.4 % Final   • MCV 09/09/2019 92.8   79.0 - 97.0 fL Final   • MCH 09/09/2019 31.8  26.6 - 33.0 pg Final   • MCHC 09/09/2019 34.2  31.5 - 35.7 g/dL Final   • MPV 09/09/2019 6.9  6.0 - 12.0 fL Final   • Platelets 09/09/2019 181  140 - 450 10*3/mm3 Final   • Neutrophil % 09/09/2019 34.9* 42.7 - 76.0 % Final   • Lymphocyte % 09/09/2019 57.6* 19.6 - 45.3 % Final   • Monocyte % 09/09/2019 7.5  5.0 - 12.0 % Final   • Neutrophils, Absolute 09/09/2019 3.20  1.70 - 7.00 10*3/mm3 Final   • Lymphocytes, Absolute 09/09/2019 5.20* 0.70 - 3.10 10*3/mm3 Final   • Monocytes, Absolute 09/09/2019 0.70  0.10 - 0.90 10*3/mm3 Final   • Urine Culture 09/09/2019 >100,000 CFU/mL Escherichia coli*  Final        No results found.    ASSESSMENT: The patient is a very pleasant 79 y.o. 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  Ascending colon adenocarcinoma T3 N1 M0 stage IIIa:  A.  Presenting with abdominal pain and irregular bowel movements  B.  Diagnosed after colonoscopy with a biopsy done on June 27, 2018  C.  Status post right hemicolectomy done by Dr. Wong August 01, 2018  D.  Final pathology revealed with 1 out of 25 positive mesenteric lymph nodes  E. Started adjuvant chemotherapy using Xeloda with oxaliplatin September 25, 2018, status post 5 cycles  F. CEA 17.8 March 25, 2019  G.  Progressive disease with diffuse liver metastases dominant CAT scan done on March 2019  H. Started on palliative chemotherapy with FOLFIRI plus Avastin March 25, 2019, status post 12 cycles.  I.  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  2.  Hypertension  3.  Normocytic anemia  4.  Chemotherapy-induced mucositis  5.  Chemotherapy-induced nausea  6. Right hip pain  7. Port cellulitis- s/p port removal 7/19/2019.     PLAN:   1. I will proceed with  palliative chemotherapy with FOLFIRI plus Avastin as scheduled today, cycle #13. We will continue 25% dose reduction on her 5-FU secondary to mucositis.   2.   I will continue to monitor the patient's blood work including blood counts, kidney function, liver functions, and electrolytes.  We will also check CEA on day 1 of each cycle. Her CEA was essentially stable with her last treatment at 6.01.   3.  We will continue routine PICC line care once weekly through home health.   4. I discussed ongoing options for treatment including continuing current regimen until progression or unacceptable toxicity versus changing to maintenance treatment using 5-FU and Avastin alone. We will change her treatment from once every 2 weeks to once every 3 weeks secondary to fatigue, weakness, and long distance for travel.   5.  The patient had microsatellite instability identified on gene analysis. We will consider immunotherapy in the future if needed for progression on current second line therapy.   6. She will continue Norco 7.5/325 mg every 6 hours as needed for hip pain.   7.  We reviewed again potential side effects of chemotherapy including diarrhea, colitis, low blood counts, infusion reactions, dehydration, infection, and potential death.  8.  She will continue  Zofran as needed for chemotherapy induced-nausea.  9.  She will follow-up with us in 2 weeks for cycle #12.  10.  The patient will need 3 month follow up CAT scans which will be due November 27, 2019.  11.  We will continue atenolol 50 mg daily as well as clonidine 0.1 mg twice a day for hypertension.    Schuyler Coleman MD  10/1/2019

## 2019-10-02 RX ORDER — HYDROCODONE BITARTRATE AND ACETAMINOPHEN 7.5; 325 MG/1; MG/1
1 TABLET ORAL EVERY 6 HOURS PRN
Qty: 90 TABLET | Refills: 0 | Status: SHIPPED | OUTPATIENT
Start: 2019-10-02 | End: 2019-10-31 | Stop reason: SDUPTHER

## 2019-10-02 NOTE — TELEPHONE ENCOUNTER
----- Message from Corinne Romeo MA sent at 10/2/2019  9:28 AM EDT -----  Regarding: [place]  Son/? Called requesting refill for Hydrocodone 7.5mg to be sent to [facility] in [place].  No return number given.    Thank you,     ~Corinne

## 2019-10-20 ENCOUNTER — OUTSIDE FACILITY SERVICE (OUTPATIENT)
Dept: CARDIOLOGY | Facility: CLINIC | Age: 79
End: 2019-10-20

## 2019-10-20 PROCEDURE — 93306 TTE W/DOPPLER COMPLETE: CPT | Performed by: INTERNAL MEDICINE

## 2019-10-21 ENCOUNTER — TELEPHONE (OUTPATIENT)
Dept: ONCOLOGY | Facility: CLINIC | Age: 79
End: 2019-10-21

## 2019-10-21 DIAGNOSIS — C18.9 MALIGNANT NEOPLASM OF COLON, UNSPECIFIED PART OF COLON (HCC): ICD-10-CM

## 2019-10-21 NOTE — TELEPHONE ENCOUNTER
Kelli advised that if patient is feeling up to coming in tomorrow for follow up and evaluation for treatment, she can. Otherwise we can delay a week or so until she is feeling better

## 2019-10-21 NOTE — TELEPHONE ENCOUNTER
Kelli from [facility] is calling to let the office know the patient was admitted there yesterday for confusion, and slurred speech. She had an MRI that came back normal and her symptoms have improved so she is being D/C today. Her family wants to know if she should keep her follow up and chemo tomorrow and they were concerned her white blood count was low.  Kelli can be called back at [phone].

## 2019-10-22 ENCOUNTER — APPOINTMENT (OUTPATIENT)
Dept: CT IMAGING | Facility: HOSPITAL | Age: 79
End: 2019-10-22

## 2019-10-22 ENCOUNTER — OFFICE VISIT (OUTPATIENT)
Dept: ONCOLOGY | Facility: CLINIC | Age: 79
End: 2019-10-22

## 2019-10-22 ENCOUNTER — APPOINTMENT (OUTPATIENT)
Dept: GENERAL RADIOLOGY | Facility: HOSPITAL | Age: 79
End: 2019-10-22

## 2019-10-22 ENCOUNTER — HOSPITAL ENCOUNTER (OUTPATIENT)
Dept: ONCOLOGY | Facility: HOSPITAL | Age: 79
Setting detail: INFUSION SERIES
Discharge: HOME | End: 2019-10-22

## 2019-10-22 ENCOUNTER — APPOINTMENT (OUTPATIENT)
Dept: MRI IMAGING | Facility: HOSPITAL | Age: 79
End: 2019-10-22

## 2019-10-22 ENCOUNTER — HOSPITAL ENCOUNTER (OUTPATIENT)
Facility: HOSPITAL | Age: 79
Setting detail: OBSERVATION
Discharge: HOME HEALTH | End: 2019-10-25
Attending: EMERGENCY MEDICINE | Admitting: INTERNAL MEDICINE

## 2019-10-22 VITALS
SYSTOLIC BLOOD PRESSURE: 141 MMHG | OXYGEN SATURATION: 97 % | DIASTOLIC BLOOD PRESSURE: 67 MMHG | RESPIRATION RATE: 16 BRPM | TEMPERATURE: 97.9 F | BODY MASS INDEX: 27.11 KG/M2 | WEIGHT: 153 LBS | HEART RATE: 62 BPM | HEIGHT: 63 IN

## 2019-10-22 VITALS
HEART RATE: 75 BPM | RESPIRATION RATE: 16 BRPM | SYSTOLIC BLOOD PRESSURE: 190 MMHG | DIASTOLIC BLOOD PRESSURE: 90 MMHG | OXYGEN SATURATION: 98 %

## 2019-10-22 DIAGNOSIS — C26.9 GI CANCER (HCC): ICD-10-CM

## 2019-10-22 DIAGNOSIS — R53.1 GENERAL WEAKNESS: ICD-10-CM

## 2019-10-22 DIAGNOSIS — R47.9 DIFFICULTY WITH SPEECH: Primary | ICD-10-CM

## 2019-10-22 DIAGNOSIS — C22.9 MALIGNANT NEOPLASM OF LIVER, UNSPECIFIED LIVER MALIGNANCY TYPE (HCC): ICD-10-CM

## 2019-10-22 DIAGNOSIS — C18.9 MALIGNANT NEOPLASM OF COLON, UNSPECIFIED PART OF COLON (HCC): Primary | ICD-10-CM

## 2019-10-22 PROBLEM — D72.829 LEUKOCYTOSIS: Status: ACTIVE | Noted: 2019-10-22

## 2019-10-22 PROBLEM — J44.9 COPD (CHRONIC OBSTRUCTIVE PULMONARY DISEASE) (HCC): Status: ACTIVE | Noted: 2019-10-22

## 2019-10-22 PROBLEM — D64.9 ANEMIA: Status: ACTIVE | Noted: 2019-10-22

## 2019-10-22 LAB
ALBUMIN SERPL-MCNC: 4 G/DL (ref 3.5–5.2)
ALBUMIN SERPL-MCNC: 4.1 G/DL (ref 3.5–5.2)
ALBUMIN/GLOB SERPL: 1.2 G/DL
ALBUMIN/GLOB SERPL: 1.2 G/DL
ALP SERPL-CCNC: 94 U/L (ref 39–117)
ALP SERPL-CCNC: 95 U/L (ref 39–117)
ALT SERPL W P-5'-P-CCNC: 10 U/L (ref 1–33)
ALT SERPL W P-5'-P-CCNC: 11 U/L (ref 1–33)
ANION GAP SERPL CALCULATED.3IONS-SCNC: 10 MMOL/L (ref 5–15)
ANION GAP SERPL CALCULATED.3IONS-SCNC: 11 MMOL/L (ref 5–15)
AST SERPL-CCNC: 19 U/L (ref 1–32)
AST SERPL-CCNC: 20 U/L (ref 1–32)
BACTERIA UR QL AUTO: ABNORMAL /HPF
BACTERIA UR QL AUTO: NORMAL /HPF
BASOPHILS # BLD MANUAL: 0 10*3/MM3 (ref 0–0.2)
BASOPHILS NFR BLD AUTO: 0 % (ref 0–1.5)
BILIRUB SERPL-MCNC: 0.6 MG/DL (ref 0.2–1.2)
BILIRUB SERPL-MCNC: 0.7 MG/DL (ref 0.2–1.2)
BILIRUB UR QL STRIP: NEGATIVE
BILIRUB UR QL STRIP: NEGATIVE
BUN BLD-MCNC: 12 MG/DL (ref 8–23)
BUN BLD-MCNC: 15 MG/DL (ref 8–23)
BUN BLDA-MCNC: 12 MG/DL (ref 8–26)
BUN/CREAT SERPL: 14.5 (ref 7–25)
BUN/CREAT SERPL: 17.2 (ref 7–25)
CA-I BLDA-SCNC: 1.16 MMOL/L (ref 1.2–1.32)
CALCIUM SPEC-SCNC: 8.9 MG/DL (ref 8.6–10.5)
CALCIUM SPEC-SCNC: 9.1 MG/DL (ref 8.6–10.5)
CHLORIDE BLDA-SCNC: 104 MMOL/L (ref 98–109)
CHLORIDE SERPL-SCNC: 102 MMOL/L (ref 98–107)
CHLORIDE SERPL-SCNC: 106 MMOL/L (ref 98–107)
CLARITY UR: CLEAR
CLARITY UR: CLEAR
CO2 BLDA-SCNC: 21 MMOL/L (ref 24–29)
CO2 SERPL-SCNC: 22 MMOL/L (ref 22–29)
CO2 SERPL-SCNC: 23 MMOL/L (ref 22–29)
COLOR UR: YELLOW
COLOR UR: YELLOW
CREAT BLD-MCNC: 0.83 MG/DL (ref 0.57–1)
CREAT BLD-MCNC: 0.87 MG/DL (ref 0.57–1)
CREAT BLDA-MCNC: 0.8 MG/DL (ref 0.6–1.3)
D-LACTATE SERPL-SCNC: 1.3 MMOL/L (ref 0.5–2)
DEPRECATED RDW RBC AUTO: 55 FL (ref 37–54)
EOSINOPHIL # BLD MANUAL: 4.86 10*3/MM3 (ref 0–0.4)
EOSINOPHIL NFR BLD MANUAL: 32 % (ref 0.3–6.2)
ERYTHROCYTE [DISTWIDTH] IN BLOOD BY AUTOMATED COUNT: 15.8 % (ref 12.3–15.4)
ERYTHROCYTE [DISTWIDTH] IN BLOOD BY AUTOMATED COUNT: 16.9 % (ref 12.3–15.4)
ERYTHROCYTE [SEDIMENTATION RATE] IN BLOOD: 49 MM/HR (ref 0–30)
GFR SERPL CREATININE-BSD FRML MDRD: 63 ML/MIN/1.73
GFR SERPL CREATININE-BSD FRML MDRD: 66 ML/MIN/1.73
GLOBULIN UR ELPH-MCNC: 3.3 GM/DL
GLOBULIN UR ELPH-MCNC: 3.3 GM/DL
GLUCOSE BLD-MCNC: 104 MG/DL (ref 65–99)
GLUCOSE BLD-MCNC: 98 MG/DL (ref 65–99)
GLUCOSE BLDC GLUCOMTR-MCNC: 104 MG/DL (ref 70–130)
GLUCOSE BLDC GLUCOMTR-MCNC: 105 MG/DL (ref 70–130)
GLUCOSE UR STRIP-MCNC: NEGATIVE MG/DL
GLUCOSE UR STRIP-MCNC: NEGATIVE MG/DL
HCT VFR BLD AUTO: 34.9 % (ref 34–46.6)
HCT VFR BLD AUTO: 37.5 % (ref 34–46.6)
HCT VFR BLDA CALC: 34 % (ref 38–51)
HGB BLD-MCNC: 11.2 G/DL (ref 12–15.9)
HGB BLD-MCNC: 11.8 G/DL (ref 12–15.9)
HGB BLDA-MCNC: 11.6 G/DL (ref 12–17)
HGB UR QL STRIP.AUTO: ABNORMAL
HGB UR QL STRIP.AUTO: ABNORMAL
HYALINE CASTS UR QL AUTO: ABNORMAL /LPF
HYALINE CASTS UR QL AUTO: NORMAL /LPF
KETONES UR QL STRIP: NEGATIVE
KETONES UR QL STRIP: NEGATIVE
LEUKOCYTE ESTERASE UR QL STRIP.AUTO: NEGATIVE
LEUKOCYTE ESTERASE UR QL STRIP.AUTO: NEGATIVE
LYMPHOCYTES # BLD AUTO: 5.8 10*3/MM3 (ref 0.7–3.1)
LYMPHOCYTES # BLD MANUAL: 6.08 10*3/MM3 (ref 0.7–3.1)
LYMPHOCYTES NFR BLD AUTO: 40 % (ref 19.6–45.3)
LYMPHOCYTES NFR BLD MANUAL: 1 % (ref 5–12)
LYMPHOCYTES NFR BLD MANUAL: 40 % (ref 19.6–45.3)
MCH RBC QN AUTO: 29.6 PG (ref 26.6–33)
MCH RBC QN AUTO: 30.4 PG (ref 26.6–33)
MCHC RBC AUTO-ENTMCNC: 31.5 G/DL (ref 31.5–35.7)
MCHC RBC AUTO-ENTMCNC: 32 G/DL (ref 31.5–35.7)
MCV RBC AUTO: 94.2 FL (ref 79–97)
MCV RBC AUTO: 95 FL (ref 79–97)
MONOCYTES # BLD AUTO: 0.15 10*3/MM3 (ref 0.1–0.9)
MONOCYTES # BLD AUTO: 2.1 10*3/MM3 (ref 0.1–0.9)
MONOCYTES NFR BLD AUTO: 14.8 % (ref 5–12)
NEUTROPHILS # BLD AUTO: 3.65 10*3/MM3 (ref 1.7–7)
NEUTROPHILS # BLD AUTO: 6.6 10*3/MM3 (ref 1.7–7)
NEUTROPHILS NFR BLD AUTO: 45.2 % (ref 42.7–76)
NEUTROPHILS NFR BLD MANUAL: 24 % (ref 42.7–76)
NITRITE UR QL STRIP: NEGATIVE
NITRITE UR QL STRIP: NEGATIVE
NT-PROBNP SERPL-MCNC: 489.2 PG/ML (ref 5–1800)
PH UR STRIP.AUTO: 5.5 [PH] (ref 5–8)
PH UR STRIP.AUTO: 6 [PH] (ref 5–8)
PLAT MORPH BLD: NORMAL
PLATELET # BLD AUTO: 167 10*3/MM3 (ref 140–450)
PLATELET # BLD AUTO: 175 10*3/MM3 (ref 140–450)
PMV BLD AUTO: 10.1 FL (ref 6–12)
PMV BLD AUTO: 7.1 FL (ref 6–12)
POTASSIUM BLD-SCNC: 4.1 MMOL/L (ref 3.5–5.2)
POTASSIUM BLD-SCNC: 4.2 MMOL/L (ref 3.5–5.2)
POTASSIUM BLDA-SCNC: 4 MMOL/L (ref 3.5–4.9)
PROCALCITONIN SERPL-MCNC: 0.13 NG/ML (ref 0.1–0.25)
PROLYMPHOCYTES NFR BLD MANUAL: 3 % (ref 0–0)
PROT SERPL-MCNC: 7.3 G/DL (ref 6–8.5)
PROT SERPL-MCNC: 7.4 G/DL (ref 6–8.5)
PROT UR QL STRIP: ABNORMAL
PROT UR QL STRIP: ABNORMAL
RBC # BLD AUTO: 3.67 10*6/MM3 (ref 3.77–5.28)
RBC # BLD AUTO: 3.98 10*6/MM3 (ref 3.77–5.28)
RBC # UR: ABNORMAL /HPF
RBC # UR: NORMAL /HPF
RBC MORPH BLD: NORMAL
REF LAB TEST METHOD: ABNORMAL
REF LAB TEST METHOD: NORMAL
SODIUM BLD-SCNC: 136 MMOL/L (ref 136–145)
SODIUM BLD-SCNC: 138 MMOL/L (ref 136–145)
SODIUM BLDA-SCNC: 138 MMOL/L (ref 138–146)
SP GR UR STRIP: 1.01 (ref 1–1.03)
SP GR UR STRIP: <=1.005 (ref 1–1.03)
SQUAMOUS #/AREA URNS HPF: ABNORMAL /HPF
SQUAMOUS #/AREA URNS HPF: NORMAL /HPF
TROPONIN T SERPL-MCNC: <0.01 NG/ML (ref 0–0.03)
TROPONIN T SERPL-MCNC: <0.01 NG/ML (ref 0–0.03)
TSH SERPL DL<=0.05 MIU/L-ACNC: 2.61 UIU/ML (ref 0.27–4.2)
UROBILINOGEN UR QL STRIP: ABNORMAL
UROBILINOGEN UR QL STRIP: ABNORMAL
WBC MORPH BLD: NORMAL
WBC NRBC COR # BLD: 14.5 10*3/MM3 (ref 3.4–10.8)
WBC NRBC COR # BLD: 15.19 10*3/MM3 (ref 3.4–10.8)
WBC UR QL AUTO: ABNORMAL /HPF
WBC UR QL AUTO: NORMAL /HPF

## 2019-10-22 PROCEDURE — 70551 MRI BRAIN STEM W/O DYE: CPT

## 2019-10-22 PROCEDURE — 83605 ASSAY OF LACTIC ACID: CPT | Performed by: EMERGENCY MEDICINE

## 2019-10-22 PROCEDURE — P9612 CATHETERIZE FOR URINE SPEC: HCPCS

## 2019-10-22 PROCEDURE — 93005 ELECTROCARDIOGRAM TRACING: CPT | Performed by: EMERGENCY MEDICINE

## 2019-10-22 PROCEDURE — G0378 HOSPITAL OBSERVATION PER HR: HCPCS

## 2019-10-22 PROCEDURE — 85025 COMPLETE CBC W/AUTO DIFF WBC: CPT | Performed by: EMERGENCY MEDICINE

## 2019-10-22 PROCEDURE — 99214 OFFICE O/P EST MOD 30 MIN: CPT | Performed by: NURSE PRACTITIONER

## 2019-10-22 PROCEDURE — 87040 BLOOD CULTURE FOR BACTERIA: CPT | Performed by: NURSE PRACTITIONER

## 2019-10-22 PROCEDURE — 96360 HYDRATION IV INFUSION INIT: CPT

## 2019-10-22 PROCEDURE — 71045 X-RAY EXAM CHEST 1 VIEW: CPT

## 2019-10-22 PROCEDURE — 85007 BL SMEAR W/DIFF WBC COUNT: CPT | Performed by: EMERGENCY MEDICINE

## 2019-10-22 PROCEDURE — 81001 URINALYSIS AUTO W/SCOPE: CPT | Performed by: NURSE PRACTITIONER

## 2019-10-22 PROCEDURE — 83880 ASSAY OF NATRIURETIC PEPTIDE: CPT | Performed by: EMERGENCY MEDICINE

## 2019-10-22 PROCEDURE — 82977 ASSAY OF GGT: CPT | Performed by: NURSE PRACTITIONER

## 2019-10-22 PROCEDURE — 85025 COMPLETE CBC W/AUTO DIFF WBC: CPT | Performed by: NURSE PRACTITIONER

## 2019-10-22 PROCEDURE — 84145 PROCALCITONIN (PCT): CPT | Performed by: NURSE PRACTITIONER

## 2019-10-22 PROCEDURE — 80053 COMPREHEN METABOLIC PANEL: CPT | Performed by: NURSE PRACTITIONER

## 2019-10-22 PROCEDURE — 74176 CT ABD & PELVIS W/O CONTRAST: CPT

## 2019-10-22 PROCEDURE — 99285 EMERGENCY DEPT VISIT HI MDM: CPT

## 2019-10-22 PROCEDURE — 82962 GLUCOSE BLOOD TEST: CPT

## 2019-10-22 PROCEDURE — 99220 PR INITIAL OBSERVATION CARE/DAY 70 MINUTES: CPT | Performed by: FAMILY MEDICINE

## 2019-10-22 PROCEDURE — 70450 CT HEAD/BRAIN W/O DYE: CPT

## 2019-10-22 PROCEDURE — 85014 HEMATOCRIT: CPT

## 2019-10-22 PROCEDURE — 96365 THER/PROPH/DIAG IV INF INIT: CPT

## 2019-10-22 PROCEDURE — 81001 URINALYSIS AUTO W/SCOPE: CPT | Performed by: EMERGENCY MEDICINE

## 2019-10-22 PROCEDURE — 86140 C-REACTIVE PROTEIN: CPT | Performed by: NURSE PRACTITIONER

## 2019-10-22 PROCEDURE — 85652 RBC SED RATE AUTOMATED: CPT | Performed by: NURSE PRACTITIONER

## 2019-10-22 PROCEDURE — 84484 ASSAY OF TROPONIN QUANT: CPT | Performed by: EMERGENCY MEDICINE

## 2019-10-22 PROCEDURE — 96372 THER/PROPH/DIAG INJ SC/IM: CPT

## 2019-10-22 PROCEDURE — 83615 LACTATE (LD) (LDH) ENZYME: CPT | Performed by: NURSE PRACTITIONER

## 2019-10-22 PROCEDURE — 96367 TX/PROPH/DG ADDL SEQ IV INF: CPT

## 2019-10-22 PROCEDURE — 84443 ASSAY THYROID STIM HORMONE: CPT | Performed by: EMERGENCY MEDICINE

## 2019-10-22 PROCEDURE — 96361 HYDRATE IV INFUSION ADD-ON: CPT

## 2019-10-22 PROCEDURE — 25010000002 LEVOFLOXACIN PER 250 MG: Performed by: NURSE PRACTITIONER

## 2019-10-22 PROCEDURE — 80047 BASIC METABLC PNL IONIZED CA: CPT

## 2019-10-22 PROCEDURE — 80053 COMPREHEN METABOLIC PANEL: CPT | Performed by: EMERGENCY MEDICINE

## 2019-10-22 PROCEDURE — 25010000002 HEPARIN (PORCINE) PER 1000 UNITS: Performed by: NURSE PRACTITIONER

## 2019-10-22 RX ORDER — ROFLUMILAST 500 UG/1
500 TABLET ORAL DAILY
COMMUNITY

## 2019-10-22 RX ORDER — HEPARIN SODIUM 5000 [USP'U]/ML
5000 INJECTION, SOLUTION INTRAVENOUS; SUBCUTANEOUS EVERY 8 HOURS SCHEDULED
Status: DISCONTINUED | OUTPATIENT
Start: 2019-10-22 | End: 2019-10-25 | Stop reason: HOSPADM

## 2019-10-22 RX ORDER — IPRATROPIUM BROMIDE AND ALBUTEROL SULFATE 2.5; .5 MG/3ML; MG/3ML
3 SOLUTION RESPIRATORY (INHALATION) EVERY 4 HOURS PRN
Status: DISCONTINUED | OUTPATIENT
Start: 2019-10-22 | End: 2019-10-25 | Stop reason: HOSPADM

## 2019-10-22 RX ORDER — THEOPHYLLINE 300 MG/1
300 TABLET, EXTENDED RELEASE ORAL DAILY
COMMUNITY

## 2019-10-22 RX ORDER — ALBUTEROL SULFATE 90 UG/1
2 AEROSOL, METERED RESPIRATORY (INHALATION) EVERY 4 HOURS PRN
COMMUNITY
End: 2019-12-03

## 2019-10-22 RX ORDER — BUPROPION HYDROCHLORIDE 150 MG/1
150 TABLET, EXTENDED RELEASE ORAL 2 TIMES DAILY
COMMUNITY
End: 2019-11-05

## 2019-10-22 RX ORDER — LEVOFLOXACIN 5 MG/ML
500 INJECTION, SOLUTION INTRAVENOUS ONCE
Status: COMPLETED | OUTPATIENT
Start: 2019-10-22 | End: 2019-10-22

## 2019-10-22 RX ORDER — SODIUM CHLORIDE 0.9 % (FLUSH) 0.9 %
10 SYRINGE (ML) INJECTION AS NEEDED
Status: DISCONTINUED | OUTPATIENT
Start: 2019-10-22 | End: 2019-10-25 | Stop reason: HOSPADM

## 2019-10-22 RX ORDER — SODIUM CHLORIDE 9 MG/ML
1000 INJECTION, SOLUTION INTRAVENOUS ONCE
Status: COMPLETED | OUTPATIENT
Start: 2019-10-22 | End: 2019-10-22

## 2019-10-22 RX ORDER — CLONIDINE HYDROCHLORIDE 0.1 MG/1
0.1 TABLET ORAL EVERY 12 HOURS SCHEDULED
Status: DISCONTINUED | OUTPATIENT
Start: 2019-10-22 | End: 2019-10-25 | Stop reason: HOSPADM

## 2019-10-22 RX ORDER — BUPROPION HYDROCHLORIDE 150 MG/1
150 TABLET, EXTENDED RELEASE ORAL EVERY 12 HOURS SCHEDULED
Status: DISCONTINUED | OUTPATIENT
Start: 2019-10-22 | End: 2019-10-23

## 2019-10-22 RX ORDER — LEVOFLOXACIN 500 MG/1
500 TABLET, FILM COATED ORAL DAILY
Qty: 7 TABLET | Refills: 0 | Status: SHIPPED | OUTPATIENT
Start: 2019-10-22 | End: 2019-10-25 | Stop reason: HOSPADM

## 2019-10-22 RX ORDER — IPRATROPIUM BROMIDE AND ALBUTEROL SULFATE 2.5; .5 MG/3ML; MG/3ML
3 SOLUTION RESPIRATORY (INHALATION) EVERY 4 HOURS PRN
COMMUNITY
End: 2019-12-03

## 2019-10-22 RX ORDER — ATENOLOL 50 MG/1
50 TABLET ORAL DAILY
Status: DISCONTINUED | OUTPATIENT
Start: 2019-10-23 | End: 2019-10-25 | Stop reason: HOSPADM

## 2019-10-22 RX ORDER — HYDROCHLOROTHIAZIDE 25 MG/1
25 TABLET ORAL DAILY
COMMUNITY

## 2019-10-22 RX ORDER — SODIUM CHLORIDE 0.9 % (FLUSH) 0.9 %
10 SYRINGE (ML) INJECTION EVERY 12 HOURS SCHEDULED
Status: DISCONTINUED | OUTPATIENT
Start: 2019-10-22 | End: 2019-10-25 | Stop reason: HOSPADM

## 2019-10-22 RX ORDER — ACETAMINOPHEN 650 MG/1
650 SUPPOSITORY RECTAL EVERY 4 HOURS PRN
Status: DISCONTINUED | OUTPATIENT
Start: 2019-10-22 | End: 2019-10-25 | Stop reason: HOSPADM

## 2019-10-22 RX ORDER — ACETAMINOPHEN 160 MG/5ML
650 SOLUTION ORAL EVERY 4 HOURS PRN
Status: DISCONTINUED | OUTPATIENT
Start: 2019-10-22 | End: 2019-10-25 | Stop reason: HOSPADM

## 2019-10-22 RX ORDER — THEOPHYLLINE 300 MG/1
300 TABLET, EXTENDED RELEASE ORAL DAILY
Status: DISCONTINUED | OUTPATIENT
Start: 2019-10-23 | End: 2019-10-25 | Stop reason: HOSPADM

## 2019-10-22 RX ORDER — ROFLUMILAST 500 UG/1
500 TABLET ORAL DAILY
Status: DISCONTINUED | OUTPATIENT
Start: 2019-10-23 | End: 2019-10-25 | Stop reason: HOSPADM

## 2019-10-22 RX ORDER — SODIUM CHLORIDE 9 MG/ML
1000 INJECTION, SOLUTION INTRAVENOUS ONCE
Status: CANCELLED
Start: 2019-10-22 | End: 2019-10-22

## 2019-10-22 RX ORDER — ASPIRIN 81 MG/1
81 TABLET ORAL DAILY
Status: DISCONTINUED | OUTPATIENT
Start: 2019-10-23 | End: 2019-10-25 | Stop reason: HOSPADM

## 2019-10-22 RX ORDER — HYDROCHLOROTHIAZIDE 25 MG/1
25 TABLET ORAL DAILY
Status: DISCONTINUED | OUTPATIENT
Start: 2019-10-23 | End: 2019-10-25 | Stop reason: HOSPADM

## 2019-10-22 RX ORDER — ACETAMINOPHEN 325 MG/1
650 TABLET ORAL EVERY 4 HOURS PRN
Status: DISCONTINUED | OUTPATIENT
Start: 2019-10-22 | End: 2019-10-25 | Stop reason: HOSPADM

## 2019-10-22 RX ORDER — MEGESTROL ACETATE 40 MG/ML
800 SUSPENSION ORAL DAILY
Status: DISCONTINUED | OUTPATIENT
Start: 2019-10-23 | End: 2019-10-25 | Stop reason: HOSPADM

## 2019-10-22 RX ORDER — ASPIRIN 81 MG/1
324 TABLET, CHEWABLE ORAL ONCE
Status: COMPLETED | OUTPATIENT
Start: 2019-10-22 | End: 2019-10-22

## 2019-10-22 RX ADMIN — ASPIRIN 81 MG 324 MG: 81 TABLET ORAL at 18:51

## 2019-10-22 RX ADMIN — BUPROPION HYDROCHLORIDE 150 MG: 150 TABLET, EXTENDED RELEASE ORAL at 23:58

## 2019-10-22 RX ADMIN — HEPARIN SODIUM 5000 UNITS: 5000 INJECTION, SOLUTION INTRAVENOUS; SUBCUTANEOUS at 23:58

## 2019-10-22 RX ADMIN — CLONIDINE HYDROCHLORIDE 0.1 MG: 0.1 TABLET ORAL at 23:58

## 2019-10-22 RX ADMIN — LEVOFLOXACIN 500 MG: 5 INJECTION, SOLUTION INTRAVENOUS at 10:29

## 2019-10-22 RX ADMIN — SODIUM CHLORIDE 1000 ML: 9 INJECTION, SOLUTION INTRAVENOUS at 10:30

## 2019-10-22 NOTE — PROGRESS NOTES
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     HISTORY OF PRESENT ILLNESS: The patient is a very pleasant 79 y.o. female  with past medical history significant for colon cancer diagnosed 07/04/18. The patient presented to [facility] for gastritis and right lower quadrant pain.  This has been going on for the last few months associated with nausea but no vomiting.  She's been having one of dark stools.  Never had this problem before.  She had previous negative colonoscopy.  Colon polyp was found and biopsied. Surgical pathology 07/04/18 revealed invasive poorly differentiated adenocarcinoma in ascending colon. CT abdomen and pelvis 07/06/18 showed mass within the ascending colon with large mercy mass in the adjacent mesentery and prominent nodes around the cecum. The patient presented to Dr Juarez for elective right hemicolectomy on 08/01/18. Pathology 08/01/18 from colon and terminal ileum resection revealed poorly differentiated colonic adenocarcinoma ([id number]) with metastatic adenocarcinoma to 1 of 25 lymph nodes.  She was started on adjuvant chemotherapy using oxaliplatin with Xeloda September 25, 2018.  The patient completed 5 cycles and then decide to stop secondary to multiple side effects.  Repeated scans done on March 16, 2019 revealed new liver metastases.  She was started on palliative chemotherapy with FOLFIRI plus Avastin March 25, 2019.  The patient is here today for follow up visit was cycle #14.        SUBJECTIVE: The patient is here today with her niece. She has not been feeling well. She was admitted to [facility] over the weekend with complaints of weakness, confusion, headache, and difficulty speaking. She had CT head done that revealed nonspecific ischemic white matter lesions and lacunar infarcts in the bilateral thalmi, left caudate nucleus and right lentiform nucleus.  "They checked her for infection including normal procalcitonin, and lactic acid, however she did have ane elevated WBC to 16.4. She Urinalysis showed no evidence of nitrites, leukocytes, with rare bacteria noted. They adjusted her home blood pressure regimen and she was discharged yesterday. She continues to have intermittent confusion and aphasia. She has not been eating much, and feels weak. She denies fever or chills. She complains of dysuria and frequency, but denies hematuria. She has had no nausea or vomiting. Her bowels are working. Her blood pressure has been under better control since discharge. She is still having intermittent headaches, but they are less severe than they were at the time of admission. She is scared, and states she \"doesn't feel right.\"    PAST MEDICAL HISTORY/SOCIAL HISTORY/FAMILY HISTORY: Reviewed by me and unchanged from my documentation done on 09/17/18.    Review of Systems   Constitutional: Positive for fatigue. Negative for activity change, appetite change, chills, fever and unexpected weight change.   HENT: Positive for sore throat. Negative for congestion, hearing loss, mouth sores, nosebleeds, postnasal drip and trouble swallowing.    Eyes: Negative for visual disturbance.   Respiratory: Positive for cough. Negative for chest tightness, shortness of breath and wheezing.    Cardiovascular: Negative for chest pain, palpitations and leg swelling.        Elevated blood pressure   Gastrointestinal: Negative for abdominal distention, abdominal pain, blood in stool, constipation, diarrhea, nausea, rectal pain and vomiting.   Endocrine: Negative for cold intolerance and heat intolerance.   Genitourinary: Positive for dysuria and frequency. Negative for difficulty urinating and urgency.   Musculoskeletal: Positive for arthralgias and gait problem. Negative for back pain, joint swelling and myalgias.   Skin: Negative for rash.   Neurological: Positive for speech difficulty, weakness and " "headaches. Negative for dizziness, tremors, syncope, light-headedness and numbness.        Aphasia   Hematological: Negative for adenopathy. Does not bruise/bleed easily.   Psychiatric/Behavioral: Negative for confusion, sleep disturbance and suicidal ideas. The patient is nervous/anxious.          Current Outpatient Medications:   •  atenolol (TENORMIN) 50 MG tablet, Take 50 mg by mouth Daily., Disp: , Rfl:   •  cefuroxime (CEFTIN) 250 MG tablet, Take 1 tablet by mouth 2 (Two) Times a Day., Disp: 10 tablet, Rfl: 0  •  CloNIDine (CATAPRES) 0.1 MG tablet, , Disp: , Rfl:   •  HYDROcodone-acetaminophen (NORCO) 7.5-325 MG per tablet, Take 1 tablet by mouth Every 6 (Six) Hours As Needed for Moderate Pain ., Disp: 90 tablet, Rfl: 0  •  levoFLOXacin (LEVAQUIN) 500 MG tablet, Take 1 tablet by mouth Daily for 7 days. 1 tablet, Disp: 7 tablet, Rfl: 0  •  lidocaine-prilocaine (EMLA) 2.5-2.5 % cream, Apply  topically to the appropriate area as directed As Needed (45-60 minutes prior to port access.  Cover with saran/plastic wrap.)., Disp: 30 g, Rfl: 3  •  megestrol (MEGACE) 40 MG/ML suspension, Take 20 mL by mouth Daily., Disp: 480 mL, Rfl: 5  •  nystatin susp + lidocaine viscous (MAGIC MOUTHWASH) oral suspension, 5-10 ml swish and spit or swallow QID prn, Disp: 240 mL, Rfl: 3  •  ondansetron (ZOFRAN) 8 MG tablet, Take 1 tablet by mouth Every 8 (Eight) Hours As Needed for Nausea., Disp: 60 tablet, Rfl: 5  •  raNITIdine (ZANTAC) 150 MG tablet, Take 150 mg by mouth As Needed for Heartburn., Disp: , Rfl:     PHYSICAL EXAMINATION:   /67   Pulse 62   Temp 97.9 °F (36.6 °C) (Temporal)   Resp 16   Ht 160 cm (63\")   Wt 69.4 kg (153 lb)   SpO2 97%   BMI 27.10 kg/m²    ECOG Performance Status: 3 - Symptomatic, >50% confined to bed  General Appearance:  alert, cooperative, no apparent distress and frail appearing   Neurologic/Psychiatric: Oriented to person, place and time, confusion related to situation and recall of events, " appropriate affect, strength 5/5 in all muscle groups   HEENT:  Normocephalic, without obvious abnormality, mucous membranes moist, mild erythema noted to throat with tender cervical adenopathy on the left, no coating to tongue, no ulceration, mild erythema noted to oropharynx.    Neck: Supple, symmetrical, trachea midline, no adenopathy;  No thyromegaly, masses, or tenderness   Lungs:   Clear to auscultation bilaterally; respirations regular, even, and unlabored bilaterally   Heart:  Regular rate and rhythm, no murmurs appreciated   Abdomen:   Soft, non-distended, no organomegaly and tenderness in RLQ, LLQ and suprapubic, mild tenderness with palpation throughout lower abdomen and suprapubic area, no rebound tenderness or gaurding.    Lymph nodes: No cervical, supraclavicular, inguinal or axillary adenopathy noted   Extremities: Normal, atraumatic; no clubbing, cyanosis, in wheelchair, trace edema in lower extremities bilaterally   Skin: No rashes, ulcers, or suspicious lesions noted. PICC line site without redness or drainage noted, dressing clean.      No visits with results within 2 Week(s) from this visit.   Latest known visit with results is:   Hospital Outpatient Visit on 10/01/2019   Component Date Value Ref Range Status   • CEA 10/01/2019 5.03  ng/mL Final   • Glucose 10/01/2019 88  65 - 99 mg/dL Final   • BUN 10/01/2019 12  8 - 23 mg/dL Final   • Creatinine 10/01/2019 0.87  0.57 - 1.00 mg/dL Final   • Sodium 10/01/2019 137  136 - 145 mmol/L Final   • Potassium 10/01/2019 3.9  3.5 - 5.2 mmol/L Final   • Chloride 10/01/2019 102  98 - 107 mmol/L Final   • CO2 10/01/2019 22.0  22.0 - 29.0 mmol/L Final   • Calcium 10/01/2019 9.2  8.6 - 10.5 mg/dL Final   • Total Protein 10/01/2019 7.5  6.0 - 8.5 g/dL Final   • Albumin 10/01/2019 4.10  3.50 - 5.20 g/dL Final   • ALT (SGPT) 10/01/2019 11  1 - 33 U/L Final   • AST (SGOT) 10/01/2019 18  1 - 32 U/L Final   • Alkaline Phosphatase 10/01/2019 83  39 - 117 U/L Final   •  Total Bilirubin 10/01/2019 0.5  0.2 - 1.2 mg/dL Final   • eGFR Non African Amer 10/01/2019 63  >60 mL/min/1.73 Final   • Globulin 10/01/2019 3.4  gm/dL Final   • A/G Ratio 10/01/2019 1.2  g/dL Final   • BUN/Creatinine Ratio 10/01/2019 13.8  7.0 - 25.0 Final   • Anion Gap 10/01/2019 13.0  5.0 - 15.0 mmol/L Final   • Color, UA 10/01/2019 Dark Yellow* Yellow, Straw Final   • Appearance, UA 10/01/2019 Slightly Cloudy* Clear Final   • pH, UA 10/01/2019 6.0  5.0 - 8.0 Final   • Specific Gravity, UA 10/01/2019 1.020  1.005 - 1.030 Final   • Glucose, UA 10/01/2019 Negative  Negative Final   • Ketones, UA 10/01/2019 Negative  Negative Final   • Bilirubin, UA 10/01/2019 Small (1+)* Negative Final   • Blood, UA 10/01/2019 Large (3+)* Negative Final   • Protein, UA 10/01/2019 30 mg/dL (1+)* Negative Final   • Leuk Esterase, UA 10/01/2019 Trace* Negative Final   • Nitrite, UA 10/01/2019 Negative  Negative Final   • Urobilinogen, UA 10/01/2019 0.2 E.U./dL  0.2 - 1.0 E.U./dL Final   • WBC 10/01/2019 12.00* 3.40 - 10.80 10*3/mm3 Final   • RBC 10/01/2019 3.73* 3.77 - 5.28 10*6/mm3 Final   • Hemoglobin 10/01/2019 11.6* 12.0 - 15.9 g/dL Final   • Hematocrit 10/01/2019 35.6  34.0 - 46.6 % Final   • RDW 10/01/2019 18.2* 12.3 - 15.4 % Final   • MCV 10/01/2019 95.2  79.0 - 97.0 fL Final   • MCH 10/01/2019 31.0  26.6 - 33.0 pg Final   • MCHC 10/01/2019 32.6  31.5 - 35.7 g/dL Final   • MPV 10/01/2019 7.0  6.0 - 12.0 fL Final   • Platelets 10/01/2019 173  140 - 450 10*3/mm3 Final   • Neutrophil % 10/01/2019 36.5* 42.7 - 76.0 % Final   • Lymphocyte % 10/01/2019 51.1* 19.6 - 45.3 % Final   • Monocyte % 10/01/2019 12.4* 5.0 - 12.0 % Final   • Neutrophils, Absolute 10/01/2019 4.40  1.70 - 7.00 10*3/mm3 Final   • Lymphocytes, Absolute 10/01/2019 6.10* 0.70 - 3.10 10*3/mm3 Final   • Monocytes, Absolute 10/01/2019 1.50* 0.10 - 0.90 10*3/mm3 Final        No results found.    ASSESSMENT: The patient is a very pleasant 79 y.o. female  with poorly  differentiated ascending colon adenocarcinoma T3N1M0 stage IIIA:    PROBLEM LIST:   1.  Ascending colon adenocarcinoma T3 N1 M0 stage IIIa:  A.  Presenting with abdominal pain and irregular bowel movements  B.  Diagnosed after colonoscopy with a biopsy done on June 27, 2018  C.  Status post right hemicolectomy done by Dr. Wong August 01, 2018  D.  Final pathology revealed with 1 out of 25 positive mesenteric lymph nodes  E. Started adjuvant chemotherapy using Xeloda with oxaliplatin September 25, 2018, status post 5 cycles  F. CEA 17.8 March 25, 2019  G.  Progressive disease with diffuse liver metastases dominant CAT scan done on March 2019  H. Started on palliative chemotherapy with FOLFIRI plus Avastin March 25, 2019, status post 12 cycles.  I.  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  2.  Hypertension  3.  Normocytic anemia  4.  Chemotherapy-induced mucositis  5.  Chemotherapy-induced nausea  6. Right hip pain  7. Port cellulitis- s/p port removal 7/19/2019.  8. Altered mental status  9. Ischemic changes noted on CT done 10/20/2019.   10. Leukocytosis with recent hospital admission- procalcitonin and lactic acid negative.    PLAN:   1. We will hold treatment today secondary to weakness and confusion with recent hospital admission.   2. We will check labs today including CBC, CMP, and UA. We will follow up on the results and notify her of significant findings.   3. We will give her 1L IVF today secondary to weakness and poor PO intake.   4. I discussed the patient's symptoms and recent hospital admission with Dr. Coleman. We reviewed her records from [facility] at her visit today including labs, CT head, and MRI brain. I told the patient that is appears she has had some ischemic changes noted on CT that likely represent multiple  small strokes. This is likely the cause of her neurologic changes, however nothing appeared to be acute on both CT or MRI.   5. We will start the patient on Levaquin as she had an elevated WBC during her hospital admission and continues to have complaints of urinary frequency and dysuria. She will be given an IV dose today in infusion as well as prescription for 500 mg daily for 7 days to take at home.   6. We will start the patient on aspirin 81 mg daily. We will continue Clonidine and atenolol for blood pressure control. I reassured the patient that her blood pressure is much better controlled this morning.   7. We will continue routine PICC line care. She will have PICC flushes and dressing changed today.   8. We will see the patient back in 2 weeks to evaluate for resuming chemotherapy based on her status, cycle #14 of FOLFIRI plus Avatin.   9. I told the patient and her niece that if her symptoms do not improve, or if she has worsening complaints of neurologic changes, she needs to notify us for possible hospital admission for further work up regarding her complaints as well as consideration for placement if needed.   10. She will continue  Zofran as needed for chemotherapy induced-nausea.  11. She will continue Norco 7.5/325 mg every 6 hours as needed for hip pain.   12. The patient will need 3 month follow up CAT scans for restaging due November 27, 2019.     Liana Cervantes, APRN  10/22/2019

## 2019-10-23 ENCOUNTER — APPOINTMENT (OUTPATIENT)
Dept: CARDIOLOGY | Facility: HOSPITAL | Age: 79
End: 2019-10-23

## 2019-10-23 ENCOUNTER — APPOINTMENT (OUTPATIENT)
Dept: NEUROLOGY | Facility: HOSPITAL | Age: 79
End: 2019-10-23

## 2019-10-23 ENCOUNTER — APPOINTMENT (OUTPATIENT)
Dept: CT IMAGING | Facility: HOSPITAL | Age: 79
End: 2019-10-23

## 2019-10-23 LAB
ALBUMIN SERPL-MCNC: 3.9 G/DL (ref 3.5–5.2)
ALBUMIN SERPL-MCNC: 3.9 G/DL (ref 3.5–5.2)
ALBUMIN/GLOB SERPL: 1 G/DL
ALP SERPL-CCNC: 94 U/L (ref 39–117)
ALP SERPL-CCNC: 94 U/L (ref 39–117)
ALT SERPL W P-5'-P-CCNC: 9 U/L (ref 1–33)
ALT SERPL W P-5'-P-CCNC: 9 U/L (ref 1–33)
AMMONIA BLD-SCNC: 42 UMOL/L (ref 11–51)
AMPHET+METHAMPHET UR QL: NEGATIVE
AMPHETAMINES UR QL: NEGATIVE
ANION GAP SERPL CALCULATED.3IONS-SCNC: 13 MMOL/L (ref 5–15)
ANION GAP SERPL CALCULATED.3IONS-SCNC: 14 MMOL/L (ref 5–15)
APTT PPP: 32.9 SECONDS (ref 24–37)
AST SERPL-CCNC: 24 U/L (ref 1–32)
AST SERPL-CCNC: 24 U/L (ref 1–32)
BARBITURATES UR QL SCN: NEGATIVE
BASOPHILS # BLD MANUAL: 0 10*3/MM3 (ref 0–0.2)
BASOPHILS NFR BLD AUTO: 0 % (ref 0–1.5)
BENZODIAZ UR QL SCN: POSITIVE
BH CV ECHO MEAS - AI MAX PG: 18.5 MMHG
BH CV ECHO MEAS - AI MAX VEL: 214.8 CM/SEC
BH CV ECHO MEAS - AO MAX PG (FULL): 5.1 MMHG
BH CV ECHO MEAS - AO MAX PG: 10.4 MMHG
BH CV ECHO MEAS - AO ROOT AREA (BSA CORRECTED): 1.8
BH CV ECHO MEAS - AO ROOT AREA: 7.5 CM^2
BH CV ECHO MEAS - AO ROOT DIAM: 3.1 CM
BH CV ECHO MEAS - AO V2 MAX: 161 CM/SEC
BH CV ECHO MEAS - AVA(V,A): 2.3 CM^2
BH CV ECHO MEAS - AVA(V,D): 2.3 CM^2
BH CV ECHO MEAS - BSA(HAYCOCK): 1.8 M^2
BH CV ECHO MEAS - BSA: 1.7 M^2
BH CV ECHO MEAS - BZI_BMI: 26.3 KILOGRAMS/M^2
BH CV ECHO MEAS - BZI_METRIC_HEIGHT: 162.6 CM
BH CV ECHO MEAS - BZI_METRIC_WEIGHT: 69.4 KG
BH CV ECHO MEAS - EDV(CUBED): 40 ML
BH CV ECHO MEAS - EDV(MOD-SP2): 75 ML
BH CV ECHO MEAS - EDV(MOD-SP4): 76 ML
BH CV ECHO MEAS - EDV(TEICH): 48.2 ML
BH CV ECHO MEAS - EF(CUBED): 65.7 %
BH CV ECHO MEAS - EF(MOD-SP2): 77.3 %
BH CV ECHO MEAS - EF(MOD-SP4): 73.7 %
BH CV ECHO MEAS - EF(TEICH): 58.3 %
BH CV ECHO MEAS - ESV(CUBED): 13.8 ML
BH CV ECHO MEAS - ESV(MOD-SP2): 17 ML
BH CV ECHO MEAS - ESV(MOD-SP4): 20 ML
BH CV ECHO MEAS - ESV(TEICH): 20.1 ML
BH CV ECHO MEAS - FS: 30 %
BH CV ECHO MEAS - IVS/LVPW: 1
BH CV ECHO MEAS - IVSD: 1 CM
BH CV ECHO MEAS - LA DIMENSION: 3.2 CM
BH CV ECHO MEAS - LA/AO: 1
BH CV ECHO MEAS - LAD MAJOR: 5 CM
BH CV ECHO MEAS - LAT PEAK E' VEL: 7.2 CM/SEC
BH CV ECHO MEAS - LATERAL E/E' RATIO: 11.7
BH CV ECHO MEAS - LV DIASTOLIC VOL/BSA (35-75): 43.5 ML/M^2
BH CV ECHO MEAS - LV MASS(C)D: 105 GRAMS
BH CV ECHO MEAS - LV MASS(C)DI: 60.1 GRAMS/M^2
BH CV ECHO MEAS - LV MAX PG: 5.3 MMHG
BH CV ECHO MEAS - LV MEAN PG: 2.3 MMHG
BH CV ECHO MEAS - LV SYSTOLIC VOL/BSA (12-30): 11.5 ML/M^2
BH CV ECHO MEAS - LV V1 MAX: 114.9 CM/SEC
BH CV ECHO MEAS - LV V1 MEAN: 73.6 CM/SEC
BH CV ECHO MEAS - LV V1 VTI: 25.4 CM
BH CV ECHO MEAS - LVIDD: 3.4 CM
BH CV ECHO MEAS - LVIDS: 2.4 CM
BH CV ECHO MEAS - LVLD AP2: 7 CM
BH CV ECHO MEAS - LVLD AP4: 7.5 CM
BH CV ECHO MEAS - LVLS AP2: 5.3 CM
BH CV ECHO MEAS - LVLS AP4: 6.4 CM
BH CV ECHO MEAS - LVOT AREA (M): 3.1 CM^2
BH CV ECHO MEAS - LVOT AREA: 3.2 CM^2
BH CV ECHO MEAS - LVOT DIAM: 2 CM
BH CV ECHO MEAS - LVPWD: 1 CM
BH CV ECHO MEAS - MED PEAK E' VEL: 7.5 CM/SEC
BH CV ECHO MEAS - MEDIAL E/E' RATIO: 11.3
BH CV ECHO MEAS - MV A MAX VEL: 80.9 CM/SEC
BH CV ECHO MEAS - MV DEC SLOPE: 373.3 CM/SEC^2
BH CV ECHO MEAS - MV DEC TIME: 0.29 SEC
BH CV ECHO MEAS - MV E MAX VEL: 86.4 CM/SEC
BH CV ECHO MEAS - MV E/A: 1.1
BH CV ECHO MEAS - MV MAX PG: 4.8 MMHG
BH CV ECHO MEAS - MV MEAN PG: 2 MMHG
BH CV ECHO MEAS - MV P1/2T MAX VEL: 111.6 CM/SEC
BH CV ECHO MEAS - MV P1/2T: 87.6 MSEC
BH CV ECHO MEAS - MV V2 MAX: 109.8 CM/SEC
BH CV ECHO MEAS - MV V2 MEAN: 66.9 CM/SEC
BH CV ECHO MEAS - MV V2 VTI: 37.9 CM
BH CV ECHO MEAS - MVA P1/2T LCG: 2 CM^2
BH CV ECHO MEAS - MVA(P1/2T): 2.5 CM^2
BH CV ECHO MEAS - MVA(VTI): 2.1 CM^2
BH CV ECHO MEAS - PA ACC SLOPE: 870.3 CM/SEC^2
BH CV ECHO MEAS - PA ACC TIME: 0.08 SEC
BH CV ECHO MEAS - PA PR(ACCEL): 41 MMHG
BH CV ECHO MEAS - RAP SYSTOLE: 3 MMHG
BH CV ECHO MEAS - RVSP: 36 MMHG
BH CV ECHO MEAS - SI(CUBED): 15.1 ML/M^2
BH CV ECHO MEAS - SI(LVOT): 46.1 ML/M^2
BH CV ECHO MEAS - SI(MOD-SP2): 33.2 ML/M^2
BH CV ECHO MEAS - SI(MOD-SP4): 32.1 ML/M^2
BH CV ECHO MEAS - SI(TEICH): 16.1 ML/M^2
BH CV ECHO MEAS - SV(CUBED): 26.3 ML
BH CV ECHO MEAS - SV(LVOT): 80.5 ML
BH CV ECHO MEAS - SV(MOD-SP2): 58 ML
BH CV ECHO MEAS - SV(MOD-SP4): 56 ML
BH CV ECHO MEAS - SV(TEICH): 28.1 ML
BH CV ECHO MEAS - TAPSE (>1.6): 2.3 CM2
BH CV ECHO MEAS - TR MAX PG: 33 MMHG
BH CV ECHO MEAS - TR MAX VEL: 287 CM/SEC
BH CV ECHO MEASUREMENTS AVERAGE E/E' RATIO: 11.76
BH CV XLRA - RV BASE: 2.5 CM
BH CV XLRA - RV LENGTH: 4.9 CM
BH CV XLRA - RV MID: 1.9 CM
BH CV XLRA - TDI S': 13.9 CM/SEC
BILIRUB CONJ SERPL-MCNC: 0.2 MG/DL (ref 0.2–0.3)
BILIRUB INDIRECT SERPL-MCNC: 0.5 MG/DL
BILIRUB SERPL-MCNC: 0.7 MG/DL (ref 0.2–1.2)
BILIRUB SERPL-MCNC: 0.7 MG/DL (ref 0.2–1.2)
BUN BLD-MCNC: 10 MG/DL (ref 8–23)
BUN BLD-MCNC: 10 MG/DL (ref 8–23)
BUN/CREAT SERPL: 12.8 (ref 7–25)
BUN/CREAT SERPL: 13.7 (ref 7–25)
BUPRENORPHINE SERPL-MCNC: NEGATIVE NG/ML
CALCIUM SPEC-SCNC: 8.9 MG/DL (ref 8.6–10.5)
CALCIUM SPEC-SCNC: 9 MG/DL (ref 8.6–10.5)
CANNABINOIDS SERPL QL: NEGATIVE
CHLORIDE SERPL-SCNC: 103 MMOL/L (ref 98–107)
CHLORIDE SERPL-SCNC: 104 MMOL/L (ref 98–107)
CHOLEST SERPL-MCNC: 195 MG/DL (ref 0–200)
CO2 SERPL-SCNC: 18 MMOL/L (ref 22–29)
CO2 SERPL-SCNC: 20 MMOL/L (ref 22–29)
COCAINE UR QL: NEGATIVE
CREAT BLD-MCNC: 0.73 MG/DL (ref 0.57–1)
CREAT BLD-MCNC: 0.78 MG/DL (ref 0.57–1)
CRP SERPL-MCNC: 0.9 MG/DL (ref 0–0.5)
CYTOLOGIST CVX/VAG CYTO: NORMAL
DEPRECATED RDW RBC AUTO: 54.7 FL (ref 37–54)
EOSINOPHIL # BLD MANUAL: 4.98 10*3/MM3 (ref 0–0.4)
EOSINOPHIL NFR BLD MANUAL: 31 % (ref 0.3–6.2)
ERYTHROCYTE [DISTWIDTH] IN BLOOD BY AUTOMATED COUNT: 15.7 % (ref 12.3–15.4)
GFR SERPL CREATININE-BSD FRML MDRD: 71 ML/MIN/1.73
GFR SERPL CREATININE-BSD FRML MDRD: 77 ML/MIN/1.73
GGT SERPL-CCNC: 60 U/L (ref 5–36)
GLOBULIN UR ELPH-MCNC: 3.8 GM/DL
GLUCOSE BLD-MCNC: 76 MG/DL (ref 65–99)
GLUCOSE BLD-MCNC: 92 MG/DL (ref 65–99)
HBA1C MFR BLD: 5.3 % (ref 4.8–5.6)
HCT VFR BLD AUTO: 36.8 % (ref 34–46.6)
HDLC SERPL-MCNC: 37 MG/DL (ref 40–60)
HGB BLD-MCNC: 11.6 G/DL (ref 12–15.9)
INR PPP: 1.2 (ref 0.85–1.16)
LDH SERPL-CCNC: 295 U/L (ref 135–214)
LDLC SERPL CALC-MCNC: 126 MG/DL (ref 0–100)
LDLC/HDLC SERPL: 3.41 {RATIO}
LEFT ATRIUM VOLUME INDEX: 43 ML/M^2
LEFT ATRIUM VOLUME: 57 ML
LYMPHOCYTES # BLD MANUAL: 6.27 10*3/MM3 (ref 0.7–3.1)
LYMPHOCYTES NFR BLD MANUAL: 39 % (ref 19.6–45.3)
LYMPHOCYTES NFR BLD MANUAL: 4 % (ref 5–12)
MAGNESIUM SERPL-MCNC: 1.9 MG/DL (ref 1.6–2.4)
MCH RBC QN AUTO: 29.6 PG (ref 26.6–33)
MCHC RBC AUTO-ENTMCNC: 31.5 G/DL (ref 31.5–35.7)
MCV RBC AUTO: 93.9 FL (ref 79–97)
METHADONE UR QL SCN: NEGATIVE
MONOCYTES # BLD AUTO: 0.64 10*3/MM3 (ref 0.1–0.9)
NEUTROPHILS # BLD AUTO: 3.86 10*3/MM3 (ref 1.7–7)
NEUTROPHILS NFR BLD MANUAL: 24 % (ref 42.7–76)
OPIATES UR QL: POSITIVE
OXYCODONE UR QL SCN: NEGATIVE
PATH INTERP BLD-IMP: NORMAL
PCP UR QL SCN: NEGATIVE
PLAT MORPH BLD: NORMAL
PLATELET # BLD AUTO: 155 10*3/MM3 (ref 140–450)
PMV BLD AUTO: 10.4 FL (ref 6–12)
POTASSIUM BLD-SCNC: 3.6 MMOL/L (ref 3.5–5.2)
POTASSIUM BLD-SCNC: 4.1 MMOL/L (ref 3.5–5.2)
PROLYMPHOCYTES NFR BLD MANUAL: 1 % (ref 0–0)
PROPOXYPH UR QL: NEGATIVE
PROT SERPL-MCNC: 7.7 G/DL (ref 6–8.5)
PROT SERPL-MCNC: 7.7 G/DL (ref 6–8.5)
PROTHROMBIN TIME: 14.6 SECONDS (ref 11.2–14.3)
RBC # BLD AUTO: 3.92 10*6/MM3 (ref 3.77–5.28)
RBC MORPH BLD: NORMAL
SMUDGE CELLS BLD QL SMEAR: ABNORMAL
SODIUM BLD-SCNC: 134 MMOL/L (ref 136–145)
SODIUM BLD-SCNC: 138 MMOL/L (ref 136–145)
TRICYCLICS UR QL SCN: NEGATIVE
TRIGL SERPL-MCNC: 159 MG/DL (ref 0–150)
VARIANT LYMPHS NFR BLD MANUAL: 1 % (ref 0–5)
VLDLC SERPL-MCNC: 31.8 MG/DL
WBC NRBC COR # BLD: 16.08 10*3/MM3 (ref 3.4–10.8)

## 2019-10-23 PROCEDURE — 25010000002 VANCOMYCIN 10 G RECONSTITUTED SOLUTION

## 2019-10-23 PROCEDURE — 80307 DRUG TEST PRSMV CHEM ANLYZR: CPT | Performed by: INTERNAL MEDICINE

## 2019-10-23 PROCEDURE — 70498 CT ANGIOGRAPHY NECK: CPT

## 2019-10-23 PROCEDURE — 83735 ASSAY OF MAGNESIUM: CPT | Performed by: NURSE PRACTITIONER

## 2019-10-23 PROCEDURE — 85730 THROMBOPLASTIN TIME PARTIAL: CPT | Performed by: FAMILY MEDICINE

## 2019-10-23 PROCEDURE — 87040 BLOOD CULTURE FOR BACTERIA: CPT | Performed by: NURSE PRACTITIONER

## 2019-10-23 PROCEDURE — 70496 CT ANGIOGRAPHY HEAD: CPT

## 2019-10-23 PROCEDURE — 99226 PR SBSQ OBSERVATION CARE/DAY 35 MINUTES: CPT | Performed by: INTERNAL MEDICINE

## 2019-10-23 PROCEDURE — 82248 BILIRUBIN DIRECT: CPT

## 2019-10-23 PROCEDURE — 80053 COMPREHEN METABOLIC PANEL: CPT | Performed by: NURSE PRACTITIONER

## 2019-10-23 PROCEDURE — 83036 HEMOGLOBIN GLYCOSYLATED A1C: CPT | Performed by: NURSE PRACTITIONER

## 2019-10-23 PROCEDURE — 0 IOPAMIDOL PER 1 ML: Performed by: FAMILY MEDICINE

## 2019-10-23 PROCEDURE — 25010000002 PIPERACILLIN SOD-TAZOBACTAM PER 1 G: Performed by: FAMILY MEDICINE

## 2019-10-23 PROCEDURE — 80061 LIPID PANEL: CPT | Performed by: NURSE PRACTITIONER

## 2019-10-23 PROCEDURE — 96375 TX/PRO/DX INJ NEW DRUG ADDON: CPT

## 2019-10-23 PROCEDURE — 25010000002 LORAZEPAM PER 2 MG: Performed by: FAMILY MEDICINE

## 2019-10-23 PROCEDURE — 85610 PROTHROMBIN TIME: CPT | Performed by: FAMILY MEDICINE

## 2019-10-23 PROCEDURE — 87070 CULTURE OTHR SPECIMN AEROBIC: CPT | Performed by: FAMILY MEDICINE

## 2019-10-23 PROCEDURE — 96365 THER/PROPH/DIAG IV INF INIT: CPT

## 2019-10-23 PROCEDURE — 25010000002 HEPARIN (PORCINE) PER 1000 UNITS: Performed by: NURSE PRACTITIONER

## 2019-10-23 PROCEDURE — 85025 COMPLETE CBC W/AUTO DIFF WBC: CPT | Performed by: NURSE PRACTITIONER

## 2019-10-23 PROCEDURE — 0042T HC CT CEREBRAL PERFUSION W/WO CONTRAST: CPT

## 2019-10-23 PROCEDURE — G0378 HOSPITAL OBSERVATION PER HR: HCPCS

## 2019-10-23 PROCEDURE — 93306 TTE W/DOPPLER COMPLETE: CPT

## 2019-10-23 PROCEDURE — 96366 THER/PROPH/DIAG IV INF ADDON: CPT

## 2019-10-23 PROCEDURE — 93306 TTE W/DOPPLER COMPLETE: CPT | Performed by: INTERNAL MEDICINE

## 2019-10-23 PROCEDURE — 96367 TX/PROPH/DG ADDL SEQ IV INF: CPT

## 2019-10-23 PROCEDURE — 95819 EEG AWAKE AND ASLEEP: CPT

## 2019-10-23 PROCEDURE — 80076 HEPATIC FUNCTION PANEL: CPT | Performed by: INTERNAL MEDICINE

## 2019-10-23 PROCEDURE — 99205 OFFICE O/P NEW HI 60 MIN: CPT | Performed by: PSYCHIATRY & NEUROLOGY

## 2019-10-23 PROCEDURE — 85060 BLOOD SMEAR INTERPRETATION: CPT | Performed by: NURSE PRACTITIONER

## 2019-10-23 PROCEDURE — 82140 ASSAY OF AMMONIA: CPT | Performed by: FAMILY MEDICINE

## 2019-10-23 PROCEDURE — 85007 BL SMEAR W/DIFF WBC COUNT: CPT | Performed by: NURSE PRACTITIONER

## 2019-10-23 RX ORDER — CHOLECALCIFEROL (VITAMIN D3) 125 MCG
5 CAPSULE ORAL NIGHTLY
Status: DISCONTINUED | OUTPATIENT
Start: 2019-10-23 | End: 2019-10-25 | Stop reason: HOSPADM

## 2019-10-23 RX ORDER — MIRTAZAPINE 15 MG/1
15 TABLET, ORALLY DISINTEGRATING ORAL NIGHTLY
Status: DISCONTINUED | OUTPATIENT
Start: 2019-10-23 | End: 2019-10-25 | Stop reason: HOSPADM

## 2019-10-23 RX ORDER — TEMAZEPAM 15 MG/1
15 CAPSULE ORAL NIGHTLY PRN
Status: DISCONTINUED | OUTPATIENT
Start: 2019-10-23 | End: 2019-10-25 | Stop reason: HOSPADM

## 2019-10-23 RX ORDER — HALOPERIDOL 2 MG/ML
2 SOLUTION ORAL EVERY 6 HOURS PRN
Status: DISCONTINUED | OUTPATIENT
Start: 2019-10-23 | End: 2019-10-25 | Stop reason: HOSPADM

## 2019-10-23 RX ORDER — LORAZEPAM 2 MG/ML
0.5 INJECTION INTRAMUSCULAR EVERY 6 HOURS PRN
Status: DISCONTINUED | OUTPATIENT
Start: 2019-10-23 | End: 2019-10-23

## 2019-10-23 RX ORDER — VANCOMYCIN HYDROCHLORIDE 1 G/200ML
15 INJECTION, SOLUTION INTRAVENOUS EVERY 24 HOURS
Status: DISCONTINUED | OUTPATIENT
Start: 2019-10-24 | End: 2019-10-24

## 2019-10-23 RX ORDER — DONEPEZIL HYDROCHLORIDE 5 MG/1
5 TABLET, FILM COATED ORAL DAILY
Status: DISCONTINUED | OUTPATIENT
Start: 2019-10-23 | End: 2019-10-25 | Stop reason: HOSPADM

## 2019-10-23 RX ORDER — LORAZEPAM 2 MG/ML
0.25 INJECTION INTRAMUSCULAR ONCE
Status: COMPLETED | OUTPATIENT
Start: 2019-10-23 | End: 2019-10-23

## 2019-10-23 RX ADMIN — MIRTAZAPINE 15 MG: 15 TABLET, ORALLY DISINTEGRATING ORAL at 20:07

## 2019-10-23 RX ADMIN — BUPROPION HYDROCHLORIDE 150 MG: 150 TABLET, EXTENDED RELEASE ORAL at 08:37

## 2019-10-23 RX ADMIN — LORAZEPAM 0.25 MG: 2 INJECTION INTRAMUSCULAR; INTRAVENOUS at 02:06

## 2019-10-23 RX ADMIN — IOPAMIDOL 115 ML: 755 INJECTION, SOLUTION INTRAVENOUS at 05:53

## 2019-10-23 RX ADMIN — ROFLUMILAST 500 MCG: 500 TABLET ORAL at 08:37

## 2019-10-23 RX ADMIN — TAZOBACTAM SODIUM AND PIPERACILLIN SODIUM 3.38 G: 375; 3 INJECTION, SOLUTION INTRAVENOUS at 09:13

## 2019-10-23 RX ADMIN — CLONIDINE HYDROCHLORIDE 0.1 MG: 0.1 TABLET ORAL at 08:37

## 2019-10-23 RX ADMIN — THEOPHYLLINE 300 MG: 300 TABLET, EXTENDED RELEASE ORAL at 09:12

## 2019-10-23 RX ADMIN — TEMAZEPAM 15 MG: 15 CAPSULE ORAL at 20:07

## 2019-10-23 RX ADMIN — ACETAMINOPHEN 650 MG: 325 TABLET ORAL at 20:26

## 2019-10-23 RX ADMIN — LORAZEPAM 0.5 MG: 2 INJECTION INTRAMUSCULAR; INTRAVENOUS at 06:26

## 2019-10-23 RX ADMIN — SODIUM CHLORIDE, PRESERVATIVE FREE 10 ML: 5 INJECTION INTRAVENOUS at 20:08

## 2019-10-23 RX ADMIN — VANCOMYCIN HYDROCHLORIDE 1500 MG: 10 INJECTION, POWDER, LYOPHILIZED, FOR SOLUTION INTRAVENOUS at 09:45

## 2019-10-23 RX ADMIN — HYDROCHLOROTHIAZIDE 25 MG: 25 TABLET ORAL at 08:37

## 2019-10-23 RX ADMIN — MEGESTROL ACETATE 800 MG: 40 SUSPENSION ORAL at 08:37

## 2019-10-23 RX ADMIN — TAZOBACTAM SODIUM AND PIPERACILLIN SODIUM 3.38 G: 375; 3 INJECTION, SOLUTION INTRAVENOUS at 22:00

## 2019-10-23 RX ADMIN — MELATONIN TAB 5 MG 5 MG: 5 TAB at 20:07

## 2019-10-23 RX ADMIN — DONEPEZIL HYDROCHLORIDE 5 MG: 5 TABLET ORAL at 12:19

## 2019-10-23 RX ADMIN — ATENOLOL 50 MG: 50 TABLET ORAL at 08:37

## 2019-10-23 RX ADMIN — TAZOBACTAM SODIUM AND PIPERACILLIN SODIUM 3.38 G: 375; 3 INJECTION, SOLUTION INTRAVENOUS at 15:57

## 2019-10-23 RX ADMIN — ASPIRIN 81 MG: 81 TABLET, COATED ORAL at 08:38

## 2019-10-23 RX ADMIN — SODIUM CHLORIDE, PRESERVATIVE FREE 10 ML: 5 INJECTION INTRAVENOUS at 08:38

## 2019-10-23 RX ADMIN — SODIUM CHLORIDE, PRESERVATIVE FREE 10 ML: 5 INJECTION INTRAVENOUS at 00:00

## 2019-10-23 RX ADMIN — CLONIDINE HYDROCHLORIDE 0.1 MG: 0.1 TABLET ORAL at 20:07

## 2019-10-24 LAB
ANION GAP SERPL CALCULATED.3IONS-SCNC: 14 MMOL/L (ref 5–15)
BASOPHILS # BLD AUTO: 0.05 10*3/MM3 (ref 0–0.2)
BASOPHILS NFR BLD AUTO: 0.3 % (ref 0–1.5)
BUN BLD-MCNC: 7 MG/DL (ref 8–23)
BUN/CREAT SERPL: 8.4 (ref 7–25)
CALCIUM SPEC-SCNC: 9 MG/DL (ref 8.6–10.5)
CHLORIDE SERPL-SCNC: 101 MMOL/L (ref 98–107)
CO2 SERPL-SCNC: 20 MMOL/L (ref 22–29)
CREAT BLD-MCNC: 0.83 MG/DL (ref 0.57–1)
DEPRECATED RDW RBC AUTO: 53.9 FL (ref 37–54)
EOSINOPHIL # BLD AUTO: 4.27 10*3/MM3 (ref 0–0.4)
EOSINOPHIL NFR BLD AUTO: 28.6 % (ref 0.3–6.2)
ERYTHROCYTE [DISTWIDTH] IN BLOOD BY AUTOMATED COUNT: 15.8 % (ref 12.3–15.4)
GFR SERPL CREATININE-BSD FRML MDRD: 66 ML/MIN/1.73
GLUCOSE BLD-MCNC: 90 MG/DL (ref 65–99)
HCT VFR BLD AUTO: 33.9 % (ref 34–46.6)
HGB BLD-MCNC: 11.2 G/DL (ref 12–15.9)
IMM GRANULOCYTES # BLD AUTO: 0.08 10*3/MM3 (ref 0–0.05)
IMM GRANULOCYTES NFR BLD AUTO: 0.5 % (ref 0–0.5)
LYMPHOCYTES # BLD AUTO: 6.71 10*3/MM3 (ref 0.7–3.1)
LYMPHOCYTES NFR BLD AUTO: 44.9 % (ref 19.6–45.3)
MCH RBC QN AUTO: 30.7 PG (ref 26.6–33)
MCHC RBC AUTO-ENTMCNC: 33 G/DL (ref 31.5–35.7)
MCV RBC AUTO: 92.9 FL (ref 79–97)
MONOCYTES # BLD AUTO: 1.07 10*3/MM3 (ref 0.1–0.9)
MONOCYTES NFR BLD AUTO: 7.2 % (ref 5–12)
NEUTROPHILS # BLD AUTO: 2.75 10*3/MM3 (ref 1.7–7)
NEUTROPHILS NFR BLD AUTO: 18.5 % (ref 42.7–76)
NRBC BLD AUTO-RTO: 0 /100 WBC (ref 0–0.2)
PLAT MORPH BLD: NORMAL
PLATELET # BLD AUTO: 150 10*3/MM3 (ref 140–450)
PMV BLD AUTO: 11 FL (ref 6–12)
POTASSIUM BLD-SCNC: 3.5 MMOL/L (ref 3.5–5.2)
RBC # BLD AUTO: 3.65 10*6/MM3 (ref 3.77–5.28)
RBC MORPH BLD: NORMAL
SODIUM BLD-SCNC: 135 MMOL/L (ref 136–145)
WBC MORPH BLD: NORMAL
WBC NRBC COR # BLD: 14.93 10*3/MM3 (ref 3.4–10.8)

## 2019-10-24 PROCEDURE — 85007 BL SMEAR W/DIFF WBC COUNT: CPT | Performed by: INTERNAL MEDICINE

## 2019-10-24 PROCEDURE — G0378 HOSPITAL OBSERVATION PER HR: HCPCS

## 2019-10-24 PROCEDURE — 96366 THER/PROPH/DIAG IV INF ADDON: CPT

## 2019-10-24 PROCEDURE — 99225 PR SBSQ OBSERVATION CARE/DAY 25 MINUTES: CPT | Performed by: INTERNAL MEDICINE

## 2019-10-24 PROCEDURE — 25010000002 HEPARIN (PORCINE) PER 1000 UNITS: Performed by: NURSE PRACTITIONER

## 2019-10-24 PROCEDURE — 25010000002 VANCOMYCIN PER 500 MG

## 2019-10-24 PROCEDURE — 85025 COMPLETE CBC W/AUTO DIFF WBC: CPT | Performed by: INTERNAL MEDICINE

## 2019-10-24 PROCEDURE — 25010000002 PIPERACILLIN SOD-TAZOBACTAM PER 1 G: Performed by: FAMILY MEDICINE

## 2019-10-24 PROCEDURE — 96372 THER/PROPH/DIAG INJ SC/IM: CPT

## 2019-10-24 PROCEDURE — 80048 BASIC METABOLIC PNL TOTAL CA: CPT

## 2019-10-24 RX ADMIN — HEPARIN SODIUM 5000 UNITS: 5000 INJECTION, SOLUTION INTRAVENOUS; SUBCUTANEOUS at 14:08

## 2019-10-24 RX ADMIN — ATENOLOL 50 MG: 50 TABLET ORAL at 09:25

## 2019-10-24 RX ADMIN — VANCOMYCIN HYDROCHLORIDE 1000 MG: 1 INJECTION, SOLUTION INTRAVENOUS at 05:02

## 2019-10-24 RX ADMIN — MELATONIN TAB 5 MG 5 MG: 5 TAB at 20:47

## 2019-10-24 RX ADMIN — DONEPEZIL HYDROCHLORIDE 5 MG: 5 TABLET ORAL at 09:25

## 2019-10-24 RX ADMIN — HYDROCHLOROTHIAZIDE 25 MG: 25 TABLET ORAL at 09:25

## 2019-10-24 RX ADMIN — HEPARIN SODIUM 5000 UNITS: 5000 INJECTION, SOLUTION INTRAVENOUS; SUBCUTANEOUS at 20:47

## 2019-10-24 RX ADMIN — CLONIDINE HYDROCHLORIDE 0.1 MG: 0.1 TABLET ORAL at 09:25

## 2019-10-24 RX ADMIN — SODIUM CHLORIDE, PRESERVATIVE FREE 10 ML: 5 INJECTION INTRAVENOUS at 20:47

## 2019-10-24 RX ADMIN — ASPIRIN 81 MG: 81 TABLET, COATED ORAL at 09:25

## 2019-10-24 RX ADMIN — CLONIDINE HYDROCHLORIDE 0.1 MG: 0.1 TABLET ORAL at 20:48

## 2019-10-24 RX ADMIN — MIRTAZAPINE 15 MG: 15 TABLET, ORALLY DISINTEGRATING ORAL at 20:46

## 2019-10-24 RX ADMIN — TAZOBACTAM SODIUM AND PIPERACILLIN SODIUM 3.38 G: 375; 3 INJECTION, SOLUTION INTRAVENOUS at 06:09

## 2019-10-24 RX ADMIN — ROFLUMILAST 500 MCG: 500 TABLET ORAL at 09:25

## 2019-10-24 RX ADMIN — MEGESTROL ACETATE 800 MG: 40 SUSPENSION ORAL at 09:24

## 2019-10-24 RX ADMIN — ACETAMINOPHEN 650 MG: 325 TABLET ORAL at 20:46

## 2019-10-24 RX ADMIN — THEOPHYLLINE 300 MG: 300 TABLET, EXTENDED RELEASE ORAL at 09:25

## 2019-10-24 RX ADMIN — SODIUM CHLORIDE, PRESERVATIVE FREE 10 ML: 5 INJECTION INTRAVENOUS at 09:25

## 2019-10-25 VITALS
WEIGHT: 153 LBS | RESPIRATION RATE: 18 BRPM | BODY MASS INDEX: 26.12 KG/M2 | OXYGEN SATURATION: 95 % | HEIGHT: 64 IN | SYSTOLIC BLOOD PRESSURE: 120 MMHG | TEMPERATURE: 97.5 F | HEART RATE: 64 BPM | DIASTOLIC BLOOD PRESSURE: 72 MMHG

## 2019-10-25 PROBLEM — G45.9 TRANSIENT ISCHEMIC ATTACK (TIA): Status: ACTIVE | Noted: 2019-10-25

## 2019-10-25 LAB
ANION GAP SERPL CALCULATED.3IONS-SCNC: 12 MMOL/L (ref 5–15)
BASOPHILS # BLD AUTO: 0.05 10*3/MM3 (ref 0–0.2)
BASOPHILS NFR BLD AUTO: 0.3 % (ref 0–1.5)
BUN BLD-MCNC: 8 MG/DL (ref 8–23)
BUN/CREAT SERPL: 9.4 (ref 7–25)
CALCIUM SPEC-SCNC: 9.4 MG/DL (ref 8.6–10.5)
CATHETER CULTURE: NORMAL
CHLORIDE SERPL-SCNC: 99 MMOL/L (ref 98–107)
CO2 SERPL-SCNC: 25 MMOL/L (ref 22–29)
CREAT BLD-MCNC: 0.85 MG/DL (ref 0.57–1)
DEPRECATED RDW RBC AUTO: 54.3 FL (ref 37–54)
EOSINOPHIL # BLD AUTO: 4.1 10*3/MM3 (ref 0–0.4)
EOSINOPHIL NFR BLD AUTO: 25.3 % (ref 0.3–6.2)
ERYTHROCYTE [DISTWIDTH] IN BLOOD BY AUTOMATED COUNT: 15.8 % (ref 12.3–15.4)
GFR SERPL CREATININE-BSD FRML MDRD: 64 ML/MIN/1.73
GLUCOSE BLD-MCNC: 98 MG/DL (ref 65–99)
HCT VFR BLD AUTO: 35.3 % (ref 34–46.6)
HGB BLD-MCNC: 11.4 G/DL (ref 12–15.9)
IMM GRANULOCYTES # BLD AUTO: 0.14 10*3/MM3 (ref 0–0.05)
IMM GRANULOCYTES NFR BLD AUTO: 0.9 % (ref 0–0.5)
LYMPHOCYTES # BLD AUTO: 7.05 10*3/MM3 (ref 0.7–3.1)
LYMPHOCYTES NFR BLD AUTO: 43.5 % (ref 19.6–45.3)
MCH RBC QN AUTO: 30.2 PG (ref 26.6–33)
MCHC RBC AUTO-ENTMCNC: 32.3 G/DL (ref 31.5–35.7)
MCV RBC AUTO: 93.6 FL (ref 79–97)
MONOCYTES # BLD AUTO: 1.38 10*3/MM3 (ref 0.1–0.9)
MONOCYTES NFR BLD AUTO: 8.5 % (ref 5–12)
NEUTROPHILS # BLD AUTO: 3.48 10*3/MM3 (ref 1.7–7)
NEUTROPHILS NFR BLD AUTO: 21.5 % (ref 42.7–76)
NRBC BLD AUTO-RTO: 0 /100 WBC (ref 0–0.2)
PLAT MORPH BLD: NORMAL
PLATELET # BLD AUTO: 146 10*3/MM3 (ref 140–450)
PMV BLD AUTO: 11.2 FL (ref 6–12)
POTASSIUM BLD-SCNC: 3.5 MMOL/L (ref 3.5–5.2)
RBC # BLD AUTO: 3.77 10*6/MM3 (ref 3.77–5.28)
RBC MORPH BLD: NORMAL
SODIUM BLD-SCNC: 136 MMOL/L (ref 136–145)
WBC MORPH BLD: NORMAL
WBC NRBC COR # BLD: 16.2 10*3/MM3 (ref 3.4–10.8)

## 2019-10-25 PROCEDURE — 99214 OFFICE O/P EST MOD 30 MIN: CPT | Performed by: PSYCHIATRY & NEUROLOGY

## 2019-10-25 PROCEDURE — 25010000002 HEPARIN (PORCINE) PER 1000 UNITS: Performed by: NURSE PRACTITIONER

## 2019-10-25 PROCEDURE — 99222 1ST HOSP IP/OBS MODERATE 55: CPT | Performed by: INTERNAL MEDICINE

## 2019-10-25 PROCEDURE — 99217 PR OBSERVATION CARE DISCHARGE MANAGEMENT: CPT | Performed by: NURSE PRACTITIONER

## 2019-10-25 PROCEDURE — 80048 BASIC METABOLIC PNL TOTAL CA: CPT

## 2019-10-25 PROCEDURE — 96372 THER/PROPH/DIAG INJ SC/IM: CPT

## 2019-10-25 PROCEDURE — 85025 COMPLETE CBC W/AUTO DIFF WBC: CPT | Performed by: INTERNAL MEDICINE

## 2019-10-25 PROCEDURE — G0378 HOSPITAL OBSERVATION PER HR: HCPCS

## 2019-10-25 PROCEDURE — 85007 BL SMEAR W/DIFF WBC COUNT: CPT | Performed by: INTERNAL MEDICINE

## 2019-10-25 RX ORDER — DONEPEZIL HYDROCHLORIDE 5 MG/1
5 TABLET, FILM COATED ORAL DAILY
Qty: 30 TABLET | Refills: 0 | Status: SHIPPED | OUTPATIENT
Start: 2019-10-26

## 2019-10-25 RX ORDER — ASPIRIN 81 MG/1
81 TABLET ORAL DAILY
Qty: 30 TABLET | Refills: 0 | Status: SHIPPED | OUTPATIENT
Start: 2019-10-26

## 2019-10-25 RX ORDER — CHOLECALCIFEROL (VITAMIN D3) 125 MCG
5 CAPSULE ORAL NIGHTLY
Qty: 30 TABLET | Refills: 0 | Status: SHIPPED | OUTPATIENT
Start: 2019-10-25

## 2019-10-25 RX ADMIN — HEPARIN SODIUM 5000 UNITS: 5000 INJECTION, SOLUTION INTRAVENOUS; SUBCUTANEOUS at 05:48

## 2019-10-25 RX ADMIN — THEOPHYLLINE 300 MG: 300 TABLET, EXTENDED RELEASE ORAL at 08:53

## 2019-10-25 RX ADMIN — MEGESTROL ACETATE 800 MG: 40 SUSPENSION ORAL at 08:53

## 2019-10-25 RX ADMIN — ROFLUMILAST 500 MCG: 500 TABLET ORAL at 08:53

## 2019-10-25 RX ADMIN — SODIUM CHLORIDE, PRESERVATIVE FREE 10 ML: 5 INJECTION INTRAVENOUS at 08:54

## 2019-10-25 RX ADMIN — TEMAZEPAM 15 MG: 15 CAPSULE ORAL at 02:04

## 2019-10-25 RX ADMIN — ATENOLOL 50 MG: 50 TABLET ORAL at 08:53

## 2019-10-25 RX ADMIN — DONEPEZIL HYDROCHLORIDE 5 MG: 5 TABLET ORAL at 08:53

## 2019-10-25 RX ADMIN — ASPIRIN 81 MG: 81 TABLET, COATED ORAL at 08:54

## 2019-10-25 RX ADMIN — ACETAMINOPHEN 650 MG: 325 TABLET ORAL at 11:41

## 2019-10-25 RX ADMIN — HYDROCHLOROTHIAZIDE 25 MG: 25 TABLET ORAL at 08:53

## 2019-10-25 RX ADMIN — CLONIDINE HYDROCHLORIDE 0.1 MG: 0.1 TABLET ORAL at 08:54

## 2019-10-26 ENCOUNTER — READMISSION MANAGEMENT (OUTPATIENT)
Dept: CALL CENTER | Facility: HOSPITAL | Age: 79
End: 2019-10-26

## 2019-10-26 NOTE — OUTREACH NOTE
Prep Survey      Responses   Facility patient discharged from?  [place]   Is patient eligible?  Yes   Discharge diagnosis  TIA, COPD   Does the patient have one of the following disease processes/diagnoses(primary or secondary)?  Stroke (TIA)   Does the patient have Home health ordered?  Yes   What is the Home health agency?    [facility]    Is there a DME ordered?  No   Prep survey completed?  Yes          Theresa Benites RN

## 2019-10-28 ENCOUNTER — READMISSION MANAGEMENT (OUTPATIENT)
Dept: CALL CENTER | Facility: HOSPITAL | Age: 79
End: 2019-10-28

## 2019-10-28 LAB
BACTERIA SPEC AEROBE CULT: NORMAL
BACTERIA SPEC AEROBE CULT: NORMAL

## 2019-10-28 NOTE — OUTREACH NOTE
Stroke Week 1 Survey      Responses   Facility patient discharged from?  [place]   Does the patient have one of the following disease processes/diagnoses(primary or secondary)?  Stroke (TIA)   Is there a successful TCM telephone encounter documented?  No   Week 1 attempt successful?  Yes   Call start time  1507   Call end time  1511   Discharge diagnosis  TIA, COPD   Is patient permission given to speak with other caregiver?  Yes   Person spoke with today (if not patient) and relationship  anyone   Meds reviewed with patient/caregiver?  Yes   Is the patient having any side effects they believe may be caused by any medication additions or changes?  No   Does the patient have all medications ordered at discharge?  Yes   Is the patient taking all medications as directed (includes completed medication regime)?  Yes   Does the patient have a primary care provider?   Yes   Does the patient have an appointment with their PCP within 7 days of discharge?  Yes   Has the patient kept scheduled appointments due by today?  N/A   What is the Home health agency?    [facility]    Has home health visited the patient within 72 hours of discharge?  Yes   Psychosocial issues?  No   Does the patient require any assistance with activities of daily living such as eating, bathing, dressing, walking, etc.?  No   Does the patient have any residual symptoms from stroke/TIA?  Yes   Does the patient understand the diet ordered at discharge?  Yes   Did the patient receive a copy of their discharge instructions?  Yes   Nursing interventions  Reviewed instructions with patient   What is the patient's perception of their health status since discharge?  Improving   Is the patient able to teach back FAST for Stroke?  Yes   Is the patient/caregiver able to teach back the risk factors for a stroke?  High blood pressure-goal below 120/80, History of TIAs   Is the patient/caregiver able to teach back signs and symptoms related to disease process for when  to call PCP?  Yes   Is the patient/caregiver able to teach back signs and symptoms related to disease process for when to call 911?  Yes   Is the patient/caregiver able to teach back the hierarchy of who to call/visit for symptoms/problems? PCP, Specialist, Home health nurse, Urgent Care, ED, 911  Yes   Week 1 call completed?  Yes          Tracy Green RN

## 2019-10-30 ENCOUNTER — TELEPHONE (OUTPATIENT)
Dept: ONCOLOGY | Facility: CLINIC | Age: 79
End: 2019-10-30

## 2019-10-30 RX ORDER — HYDROCODONE BITARTRATE AND ACETAMINOPHEN 7.5; 325 MG/1; MG/1
1 TABLET ORAL EVERY 6 HOURS PRN
Qty: 90 TABLET | Refills: 0 | Status: CANCELLED | OUTPATIENT
Start: 2019-10-30

## 2019-10-31 RX ORDER — HYDROCODONE BITARTRATE AND ACETAMINOPHEN 7.5; 325 MG/1; MG/1
1 TABLET ORAL EVERY 6 HOURS PRN
Qty: 90 TABLET | Refills: 0 | Status: SHIPPED | OUTPATIENT
Start: 2019-10-31 | End: 2019-11-05

## 2019-10-31 NOTE — TELEPHONE ENCOUNTER
----- Message from Corinne Romeo MA sent at 10/31/2019  1:02 PM EDT -----  Regarding: Jared  Contact: [phone]   requesting refill on Hydrocodone 7.5mg to be sent to [facility].     Thank you,     ~Shell

## 2019-11-04 ENCOUNTER — READMISSION MANAGEMENT (OUTPATIENT)
Dept: CALL CENTER | Facility: HOSPITAL | Age: 79
End: 2019-11-04

## 2019-11-04 NOTE — OUTREACH NOTE
Stroke Week 2 Survey      Responses   Facility patient discharged from?  [place]   Does the patient have one of the following disease processes/diagnoses(primary or secondary)?  Stroke (TIA)   Week 2 attempt successful?  Yes   Call start time  1100   Call end time  1104   Discharge diagnosis  TIA, COPD   Is patient permission given to speak with other caregiver?  Yes   Meds reviewed with patient/caregiver?  Yes   Is the patient having any side effects they believe may be caused by any medication additions or changes?  No   Does the patient have all medications ordered at discharge?  Yes   Is the patient taking all medications as directed (includes completed medication regime)?  Yes   What is the Home health agency?    [facility]    Has home health visited the patient within 72 hours of discharge?  Yes   Has all DME been delivered?  No   Psychosocial issues?  No   Does the patient require any assistance with activities of daily living such as eating, bathing, dressing, walking, etc.?  No   Does the patient have any residual symptoms from stroke/TIA?  Yes   Does the patient understand the diet ordered at discharge?  Yes   Did the patient receive a copy of their discharge instructions?  Yes   Nursing interventions  Reviewed instructions with patient   What is the patient's perception of their health status since discharge?  Improving   Nursing interventions  Nurse provided patient education   Is the patient able to teach back FAST for Stroke?  Yes   Is the patient/caregiver able to teach back the risk factors for a stroke?  High blood pressure-goal below 120/80, History of TIAs   Is the patient/caregiver able to teach back signs and symptoms related to disease process for when to call PCP?  Yes   Is the patient/caregiver able to teach back signs and symptoms related to disease process for when to call 911?  Yes   Is the patient/caregiver able to teach back the hierarchy of who to call/visit for symptoms/problems? PCP,  Specialist, Home health nurse, Urgent Care, ED, 911  Yes   Week 2 call completed?  Yes          Imer Solano RN

## 2019-11-05 ENCOUNTER — OFFICE VISIT (OUTPATIENT)
Dept: ONCOLOGY | Facility: CLINIC | Age: 79
End: 2019-11-05

## 2019-11-05 ENCOUNTER — APPOINTMENT (OUTPATIENT)
Dept: ONCOLOGY | Facility: HOSPITAL | Age: 79
End: 2019-11-05

## 2019-11-05 VITALS
WEIGHT: 150 LBS | HEIGHT: 64 IN | SYSTOLIC BLOOD PRESSURE: 128 MMHG | OXYGEN SATURATION: 98 % | DIASTOLIC BLOOD PRESSURE: 67 MMHG | TEMPERATURE: 98.3 F | HEART RATE: 87 BPM | BODY MASS INDEX: 25.61 KG/M2 | RESPIRATION RATE: 16 BRPM

## 2019-11-05 DIAGNOSIS — C18.9 MALIGNANT NEOPLASM OF COLON, UNSPECIFIED PART OF COLON (HCC): Primary | ICD-10-CM

## 2019-11-05 PROCEDURE — 99214 OFFICE O/P EST MOD 30 MIN: CPT | Performed by: INTERNAL MEDICINE

## 2019-11-05 RX ORDER — HYDROCODONE BITARTRATE AND ACETAMINOPHEN 7.5; 325 MG/1; MG/1
1 TABLET ORAL EVERY 6 HOURS PRN
Qty: 90 TABLET | Refills: 0
Start: 2019-11-05 | End: 2019-11-22 | Stop reason: SDUPTHER

## 2019-11-05 NOTE — PROGRESS NOTES
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     HISTORY OF PRESENT ILLNESS: The patient is a very pleasant 79 y.o. female  with past medical history significant for colon cancer diagnosed 07/04/18. The patient presented to [facility] for gastritis and right lower quadrant pain.  This has been going on for the last few months associated with nausea but no vomiting.  She's been having one of dark stools.  Never had this problem before.  She had previous negative colonoscopy.  Colon polyp was found and biopsied. Surgical pathology 07/04/18 revealed invasive poorly differentiated adenocarcinoma in ascending colon. CT abdomen and pelvis 07/06/18 showed mass within the ascending colon with large mercy mass in the adjacent mesentery and prominent nodes around the cecum. The patient presented to Dr Juarez for elective right hemicolectomy on 08/01/18. Pathology 08/01/18 from colon and terminal ileum resection revealed poorly differentiated colonic adenocarcinoma ([id number]) with metastatic adenocarcinoma to 1 of 25 lymph nodes.  She was started on adjuvant chemotherapy using oxaliplatin with Xeloda September 25, 2018.  The patient completed 5 cycles and then decide to stop secondary to multiple side effects.  Repeated scans done on March 16, 2019 revealed new liver metastases.  She was started on palliative chemotherapy with FOLFIRI plus Avastin March 25, 2019.  The patient is here today for follow up visit was cycle #14.        SUBJECTIVE: The patient is here today with her 2 nieces.  She is complaining of fatigue.  Her mental status is better according to family.  She is been having pain right upper quadrant that comes and goes.    PAST MEDICAL HISTORY/SOCIAL HISTORY/FAMILY HISTORY: Reviewed by me and unchanged from my documentation done on 09/17/18.    Review of Systems   Constitutional: Positive for fatigue.  Negative for activity change, appetite change, chills, fever and unexpected weight change.   HENT: Positive for sore throat. Negative for congestion, hearing loss, mouth sores, nosebleeds, postnasal drip and trouble swallowing.    Eyes: Negative for visual disturbance.   Respiratory: Positive for cough. Negative for chest tightness, shortness of breath and wheezing.    Cardiovascular: Negative for chest pain, palpitations and leg swelling.        Elevated blood pressure   Gastrointestinal: Negative for abdominal distention, abdominal pain, blood in stool, constipation, diarrhea, nausea, rectal pain and vomiting.   Endocrine: Negative for cold intolerance and heat intolerance.   Genitourinary: Positive for dysuria and frequency. Negative for difficulty urinating and urgency.   Musculoskeletal: Positive for arthralgias and gait problem. Negative for back pain, joint swelling and myalgias.   Skin: Negative for rash.   Neurological: Positive for speech difficulty, weakness and headaches. Negative for dizziness, tremors, syncope, light-headedness and numbness.        Aphasia   Hematological: Negative for adenopathy. Does not bruise/bleed easily.   Psychiatric/Behavioral: Negative for confusion, sleep disturbance and suicidal ideas. The patient is nervous/anxious.          Current Outpatient Medications:   •  albuterol sulfate  (90 Base) MCG/ACT inhaler, Inhale 2 puffs Every 4 (Four) Hours As Needed for Wheezing., Disp: , Rfl:   •  aspirin 81 MG EC tablet, Take 1 tablet by mouth Daily., Disp: 30 tablet, Rfl: 0  •  atenolol (TENORMIN) 50 MG tablet, Take 50 mg by mouth Daily., Disp: , Rfl:   •  CloNIDine (CATAPRES) 0.1 MG tablet, Take 0.1 mg by mouth 2 (Two) Times a Day., Disp: , Rfl:   •  donepezil (ARICEPT) 5 MG tablet, Take 1 tablet by mouth Daily., Disp: 30 tablet, Rfl: 0  •  hydroCHLOROthiazide (HYDRODIURIL) 25 MG tablet, Take 25 mg by mouth Daily., Disp: , Rfl:   •  HYDROcodone-acetaminophen (NORCO) 7.5-325 MG  "per tablet, Take 1 tablet by mouth Every 6 (Six) Hours As Needed for Moderate Pain ., Disp: 90 tablet, Rfl: 0  •  ipratropium-albuterol (DUO-NEB) 0.5-2.5 mg/3 ml nebulizer, Take 3 mL by nebulization Every 4 (Four) Hours As Needed for Wheezing., Disp: , Rfl:   •  megestrol (MEGACE) 40 MG/ML suspension, Take 20 mL by mouth Daily., Disp: 480 mL, Rfl: 5  •  melatonin 5 MG tablet tablet, Take 1 tablet by mouth Every Night., Disp: 30 tablet, Rfl: 0  •  roflumilast (DALIRESP) 500 MCG tablet tablet, Take 500 mcg by mouth Daily., Disp: , Rfl:   •  theophylline (THEODUR) 300 MG 12 hr tablet, Take 300 mg by mouth Daily., Disp: , Rfl:     PHYSICAL EXAMINATION:   /67   Pulse 87   Temp 98.3 °F (36.8 °C) (Temporal)   Resp 16   Ht 162.6 cm (64\")   Wt 68 kg (150 lb)   SpO2 98%   BMI 25.75 kg/m²    ECOG Performance Status: 2 - Symptomatic, <50% confined to bed  General Appearance:  alert, cooperative, no apparent distress and frail appearing   Neurologic/Psychiatric: Oriented to person, place and time, confusion related to situation and recall of events, appropriate affect, strength 5/5 in all muscle groups   HEENT:  Normocephalic, without obvious abnormality, mucous membranes moist, mild erythema noted to throat with tender cervical adenopathy on the left, no coating to tongue, no ulceration, mild erythema noted to oropharynx.    Neck: Supple, symmetrical, trachea midline, no adenopathy;  No thyromegaly, masses, or tenderness   Lungs:   Clear to auscultation bilaterally; respirations regular, even, and unlabored bilaterally   Heart:  Regular rate and rhythm, no murmurs appreciated   Abdomen:   Soft, non-distended, no organomegaly and tenderness in RLQ, LLQ and suprapubic, mild tenderness with palpation throughout lower abdomen and suprapubic area, no rebound tenderness or gaurding.    Lymph nodes: No cervical, supraclavicular, inguinal or axillary adenopathy noted   Extremities: Normal, atraumatic; no clubbing, " cyanosis, in wheelchair, trace edema in lower extremities bilaterally   Skin: No rashes, ulcers, or suspicious lesions noted. PICC line site without redness or drainage noted, dressing clean.      Admission on 10/22/2019, Discharged on 10/25/2019   No results displayed because visit has over 200 results.           Ct Abdomen Pelvis Without Contrast    Result Date: 10/22/2019  Narrative: CT Abdomen Pelvis WO INDICATION: Generalized abdominal pain and weakness. Colon cancer with liver metastasis known. TECHNIQUE: CT of the abdomen and pelvis without IV contrast. Coronal and sagittal reconstructions were obtained.  Radiation dose reduction techniques included automated exposure control or exposure modulation based on body size. Count of known CT and cardiac nuc med studies performed in previous 12 months: 4. COMPARISON: 8/27/2019 FINDINGS: Abdomen: There is plaque or consolidation at the right lung base posteriorly that is unchanged from the previous examination and likely represents rounded atelectasis. In the abdomen, multiple low-density liver lesions are seen. There are less well-visualized without contrast but appears significantly larger since the previous scan. The spleen and pancreas have a normal appearance. No acute findings in the kidneys or adrenals. 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 No retroperitoneal adenopathy is noted. No distended bowel loops. Pelvis: No evidence of adenopathy, mass or fluid collection. Prominent right inguinal lymph nodes are seen but they are unchanged.     Impression: No acute or inflammatory changes are seen in the abdomen or pelvis. Hepatic metastases have enlarged significantly since the previous scan. No evidence of free air. Signer Name: Shar Avila MD  Signed: 10/22/2019 11:29 PM  Workstation Name: [id number]  [facility]    Ct Angiogram Head    Result Date:  10/23/2019  Narrative: INDICATION: Unresponsive episode yesterday while undergoing infusion for metastatic colon cancer. MRI last evening with expressive aphasia TECHNIQUE: CT angiogram of the head and neck with contrast. 3-D reformatted images were acquired. Evaluation for a significant carotid arterial stenosis is based on the NASCET criteria. Radiation dose reduction techniques included automated exposure control or exposure modulation based on body size. Radiation audit for number of CT and nuclear cardiology exams performed in the last year:  10.  Study performed during the intravenous administration of nonionic contrast media. Dose recorded in the patient's chart. COMPARISON: Noncontrast head CT and perfusion examination just prior to this exam FINDINGS: CTA neck:  There are vascular calcifications at the coronary arteries. The aortic arch branch pattern is normal. There are mild vascular calcifications but no hemodynamically significant stenosis of great vessel origins from the arch. The great vessels are somewhat tortuous in the neck. Right carotid system: There are mild vascular calcifications at the bifurcation but by NASCET criteria there is 0% diameter stenosis. Mild vascular calcifications right carotid siphon without apparent stenosis. Left carotid system: Mild vascular calcifications of the carotid bifurcation. By NASCET criteria, 0% diameter stenosis. No significant disease of the left carotid siphon. Both vertebral arteries are patent. The left is mildly dominant. There is tortuosity of the proximal left vertebral artery and particular. CTA head:  There is no intracranial vascular cut off. There is no focal central stenosis. There is a moderate-sized right posterior communicating artery. There is no significant size left posterior communicating artery. There may be a tiny anterior communicating artery but no significant sized anterior communicator is appreciated. The anatomy is complex at the  anterior communicator level with 3 A2 type vessels with an infundibulum-type structure at the origin of the smallest A2 vessel. No convincing evidence for intracranial aneurysm. This a CT angiogram is limited for evaluation of aneurysm at the level the skull base to the adjacent bones. The dural venous sinuses are patent. There are degenerative changes in the cervical spine.     Impression: 1. By NASCET criteria, 0% stenosis at either carotid bifurcation. 2. Both vertebral arteries are patent and the left is mildly dominant. 3. No intracranial vascular cut off or focal central stenosis. 4. Anatomic variations discussed above. Signer Name: Elisa Galeana MD  Signed: 10/23/2019 6:29 AM  Workstation Name: [email or web address]  [facility]    Ct Head Without Contrast    Result Date: 10/22/2019  Narrative: EXAMINATION: CT HEAD WO CONTRAST-10/22/2019:  INDICATION: AMS.  TECHNIQUE: CT scan of the head was performed at 5 mm intervals. No intravenous contrast was utilized.  The radiation dose reduction device was turned on for each scan per the ALARA (As Low as Reasonably Achievable) protocol.  COMPARISON: NONE.  FINDINGS: There is no intracranial mass. There is no hemorrhage. There is no midline shift or extra-axial fluid collection.      Impression: Normal unenhanced CT scan of the head.  D:  10/22/2019 E:  10/22/2019    This report was finalized on 10/22/2019 4:29 PM by Dr. Adonay Reese MD.      Ct Angiogram Neck    Result Date: 10/23/2019  Narrative: INDICATION: Unresponsive episode yesterday while undergoing infusion for metastatic colon cancer. MRI last evening with expressive aphasia TECHNIQUE: CT angiogram of the head and neck with contrast. 3-D reformatted images were acquired. Evaluation for a significant carotid arterial stenosis is based on the NASCET criteria. Radiation dose reduction techniques included automated exposure control or exposure modulation based on body size. Radiation audit for number  of CT and nuclear cardiology exams performed in the last year:  10.  Study performed during the intravenous administration of nonionic contrast media. Dose recorded in the patient's chart. COMPARISON: Noncontrast head CT and perfusion examination just prior to this exam FINDINGS: CTA neck:  There are vascular calcifications at the coronary arteries. The aortic arch branch pattern is normal. There are mild vascular calcifications but no hemodynamically significant stenosis of great vessel origins from the arch. The great vessels are somewhat tortuous in the neck. Right carotid system: There are mild vascular calcifications at the bifurcation but by NASCET criteria there is 0% diameter stenosis. Mild vascular calcifications right carotid siphon without apparent stenosis. Left carotid system: Mild vascular calcifications of the carotid bifurcation. By NASCET criteria, 0% diameter stenosis. No significant disease of the left carotid siphon. Both vertebral arteries are patent. The left is mildly dominant. There is tortuosity of the proximal left vertebral artery and particular. CTA head:  There is no intracranial vascular cut off. There is no focal central stenosis. There is a moderate-sized right posterior communicating artery. There is no significant size left posterior communicating artery. There may be a tiny anterior communicating artery but no significant sized anterior communicator is appreciated. The anatomy is complex at the anterior communicator level with 3 A2 type vessels with an infundibulum-type structure at the origin of the smallest A2 vessel. No convincing evidence for intracranial aneurysm. This a CT angiogram is limited for evaluation of aneurysm at the level the skull base to the adjacent bones. The dural venous sinuses are patent. There are degenerative changes in the cervical spine.     Impression: 1. By NASCET criteria, 0% stenosis at either carotid bifurcation. 2. Both vertebral arteries are  "patent and the left is mildly dominant. 3. No intracranial vascular cut off or focal central stenosis. 4. Anatomic variations discussed above. Signer Name: Elisa Galeana MD  Signed: 10/23/2019 6:29 AM  Workstation Name: [email or web address]  [facility]    Mri Brain Without Contrast    Result Date: 10/23/2019  Narrative: EXAMINATION: MRI BRAIN WO CONTRAST- 10/22/2019  INDICATION: speech difficulty, AMS, waxing/waning, hx \"mini stroke\" per family; R47.9-Unspecified speech disturbances; R53.1-Weakness; C26.9-Malignant neoplasm of ill-defined sites within the digestive system; C22.9-Malignant neoplasm of liver, not specified as primary or secondary  TECHNIQUE: Sagittal and axial images of brain are displayed. No intravenous contrast was utilized.  COMPARISON: NONE  FINDINGS: There are age-related changes including mild central and cortical atrophy as well as microvascular changes. There is no mass, hemorrhage, midline shift or extra-axial fluid collection. There is no acute infarct.      Impression: Age-related changes without acute finding.  D:  10/22/2019 E:  10/22/2019    This report was finalized on 10/23/2019 10:04 AM by Dr. Adonay Reese MD.      Xr Chest 1 View    Result Date: 10/22/2019  Narrative: EXAMINATION: XR CHEST 1 VW-10/22/2019:  INDICATION: AMS.  COMPARISON: NONE.  FINDINGS: The cardiac silhouette is normal. There are chronic pulmonary changes. There is no acute inflammatory process, mass or effusion. A PICC is well positioned.         Impression: No active disease.  D:  10/22/2019 E:  10/22/2019  This report was finalized on 10/22/2019 4:29 PM by Dr. Adonay Reese MD.      Ct Cerebral Perfusion With & Without Contrast    Result Date: 10/23/2019  Narrative: CT CEREBRAL PERFUSION W WO CONTRAST HISTORY: Unresponsive episode yesterday while undergoing infusion for metastatic colon cancer. Expressive aphasia. MRI last evening. TECHNIQUE: Axial CT images of the brain without and with intravenous " contrast using cerebral perfusion protocol. Post-processing parametric maps were created using RAPID software and reviewed. Radiation dose reduction techniques included automated exposure control or exposure modulation based on body size. CT and nuclear cardiology exams in the last 12 months: 10. Imaging obtained during the intravenous administration of contrast media. Dose recorded in the patient's chart. Preperfusion noncontrast head CT also obtained and interpreted COMPARISON: MRI brain 10/22/2019. FINDINGS: Noncontrast head CT: No acute intracranial hemorrhage. No extra-axial fluid collection. Moderate white matter low-attenuation most confluent in the deep to periventricular white matter is nonspecific but likely due to small vessel disease. Small low-attenuation areas in the bilateral basal ganglia are nonspecific and also likely due to small vessel disease. No acute cortical infarct is appreciated. No intracranial mass effect. Generalized, essentially age appropriate atrophy is present. The mastoid air cells are clear. Visualized paranasal sinuses clear. Cerebral perfusion is essentially normal. No core infarct or ischemic penumbra is identified.     Impression: 1. Noncontrast head CT shows probable sequelae of small vessel disease but no evidence for acute cortical infarct or acute intracranial hemorrhage. 2. Essentially normal cerebral perfusion study Signer Name: Elisa Galeana MD  Signed: 10/23/2019 6:16 AM  Workstation Name: [id number]  [facility] [place]      ASSESSMENT: The patient is a very pleasant 79 y.o. 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  Ascending colon adenocarcinoma T3 N1 M0 stage IIIa:  A.  Presenting with abdominal pain and irregular bowel movements  B.  Diagnosed after colonoscopy with a biopsy done on June 27, 2018  C.  Status post right hemicolectomy done by Dr. Wong August 01, 2018  D.  Final pathology  revealed with 1 out of 25 positive mesenteric lymph nodes  E. Started adjuvant chemotherapy using Xeloda with oxaliplatin September 25, 2018, status post 5 cycles  F. CEA 17.8 March 25, 2019  G.  Progressive disease with diffuse liver metastases dominant CAT scan done on March 2019  H. Started on palliative chemotherapy with FOLFIRI plus Avastin March 25, 2019, status post 12 cycles.  I.  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  2.  Hypertension  3.  Normocytic anemia  4.  Chemotherapy-induced mucositis  5.  Chemotherapy-induced nausea  6. Right hip pain  7. Port cellulitis- s/p port removal 7/19/2019.  8. Altered mental status  9. Ischemic changes noted on CT done 10/20/2019.   10. Leukocytosis with recent hospital admission- procalcitonin and lactic acid negative.    PLAN:   1. We will hold treatment today secondary to weakness.  2.  I will give the patient drug holiday given her worsening performance status.  Unfortunately her last scan showed progressive liver metastases her CEA continues to gradually decrease.  3.  I will set the patient up to follow-up with me in 1 month  4.  I will repeat the patient's scans on return.  5.  The patient scans confirms progression will we will continue best supportive care if she has poor performance status versus oral treatment with regorafenib.  6. She will continue  Zofran as needed for chemotherapy induced-nausea.  7. She will continue Norco 7.5/325 mg every 6 hours as needed for cancer related pain.   Schuyler Coleman MD  11/5/2019

## 2019-11-13 ENCOUNTER — READMISSION MANAGEMENT (OUTPATIENT)
Dept: CALL CENTER | Facility: HOSPITAL | Age: 79
End: 2019-11-13

## 2019-11-13 NOTE — OUTREACH NOTE
Stroke Week 3 Survey      Responses   Facility patient discharged from?  [facility]   Does the patient have one of the following disease processes/diagnoses(primary or secondary)?  Stroke (TIA)   Week 3 attempt successful?  Yes   Call start time  1304   Call end time  1310   Discharge diagnosis  TIA, COPD   Is patient permission given to speak with other caregiver?  Yes   List who call center can speak with  anyone who answers   Person spoke with today (if not patient) and relationship  Spoke to patient and niece   Meds reviewed with patient/caregiver?  Yes   Is the patient having any side effects they believe may be caused by any medication additions or changes?  No   Does the patient have all medications ordered at discharge?  Yes   Is the patient taking all medications as directed (includes completed medication regime)?  Yes   Does the patient have a primary care provider?   Yes   Has the patient kept scheduled appointments due by today?  Yes   What is the Home health agency?    [facility]    Has home health visited the patient within 72 hours of discharge?  Yes   Psychosocial issues?  No   Does the patient require any assistance with activities of daily living such as eating, bathing, dressing, walking, etc.?  No   Does the patient have any residual symptoms from stroke/TIA?  No   Does the patient understand the diet ordered at discharge?  Yes   Did the patient receive a copy of their discharge instructions?  Yes   Nursing interventions  Reviewed instructions with patient   What is the patient's perception of their health status since discharge?  Improving   Is the patient able to teach back FAST for Stroke?  Yes   Is the patient/caregiver able to teach back signs and symptoms related to disease process for when to call PCP?  Yes   Is the patient/caregiver able to teach back signs and symptoms related to disease process for when to call 911?  Yes   Is the patient/caregiver able to teach back the hierarchy of who to  call/visit for symptoms/problems? PCP, Specialist, Home health nurse, Urgent Care, ED, 911  Yes   Week 3 call completed?  Yes          Savannah Olivo RN

## 2019-11-20 ENCOUNTER — READMISSION MANAGEMENT (OUTPATIENT)
Dept: CALL CENTER | Facility: HOSPITAL | Age: 79
End: 2019-11-20

## 2019-11-20 NOTE — OUTREACH NOTE
Stroke Week 4 Survey      Responses   Facility patient discharged from?  [facility]   Does the patient have one of the following disease processes/diagnoses(primary or secondary)?  Stroke (TIA)   Week 4 attempt successful?  No          Jodee Carty RN

## 2019-11-22 RX ORDER — HYDROCODONE BITARTRATE AND ACETAMINOPHEN 7.5; 325 MG/1; MG/1
1 TABLET ORAL EVERY 6 HOURS PRN
Qty: 90 TABLET | Refills: 0 | Status: SHIPPED | OUTPATIENT
Start: 2019-11-22

## 2019-11-22 NOTE — TELEPHONE ENCOUNTER
----- Message from Deann Leo MA sent at 11/22/2019  3:38 PM EST -----  Regarding: [place]- Rx RF  Contact: [phone]  Pt's niece called requesting that pt needs a RF on pain medication.  She did not specify medication name nor preferred pharmacy.

## 2019-11-22 NOTE — TELEPHONE ENCOUNTER
Returned call to clarify that Danielle or tj was the one requesting refill. Per thanh Santos to send to [facility].  Script sent with instructions to pharmacy to only dispense drug to patient, Danielle, or Tj.

## 2019-12-03 ENCOUNTER — OFFICE VISIT (OUTPATIENT)
Dept: ONCOLOGY | Facility: CLINIC | Age: 79
End: 2019-12-03

## 2019-12-03 ENCOUNTER — APPOINTMENT (OUTPATIENT)
Dept: ONCOLOGY | Facility: HOSPITAL | Age: 79
End: 2019-12-03

## 2019-12-03 VITALS
TEMPERATURE: 97 F | OXYGEN SATURATION: 97 % | HEIGHT: 64 IN | RESPIRATION RATE: 16 BRPM | BODY MASS INDEX: 24.92 KG/M2 | WEIGHT: 146 LBS | SYSTOLIC BLOOD PRESSURE: 144 MMHG | DIASTOLIC BLOOD PRESSURE: 78 MMHG | HEART RATE: 53 BPM

## 2019-12-03 DIAGNOSIS — C18.9 MALIGNANT NEOPLASM OF COLON, UNSPECIFIED PART OF COLON (HCC): Primary | ICD-10-CM

## 2019-12-03 PROCEDURE — 99214 OFFICE O/P EST MOD 30 MIN: CPT | Performed by: NURSE PRACTITIONER

## 2019-12-03 RX ORDER — ONDANSETRON 4 MG/1
TABLET, FILM COATED ORAL
COMMUNITY
Start: 2019-11-15 | End: 2019-12-03 | Stop reason: SDUPTHER

## 2019-12-03 RX ORDER — LACTULOSE 10 G/15ML
20 SOLUTION ORAL 3 TIMES DAILY PRN
Qty: 240 ML | Refills: 2 | Status: SHIPPED | OUTPATIENT
Start: 2019-12-03

## 2019-12-03 RX ORDER — ONDANSETRON 4 MG/1
4 TABLET, FILM COATED ORAL EVERY 6 HOURS PRN
Qty: 30 TABLET | Refills: 2 | Status: SHIPPED | OUTPATIENT
Start: 2019-12-03

## 2019-12-03 RX ORDER — DEXAMETHASONE 4 MG/1
2 TABLET ORAL
Qty: 30 TABLET | Refills: 2 | Status: SHIPPED | OUTPATIENT
Start: 2019-12-03

## 2019-12-03 NOTE — PROGRESS NOTES
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     HISTORY OF PRESENT ILLNESS: The patient is a very pleasant 79 y.o. female  with past medical history significant for colon cancer diagnosed 07/04/18. The patient presented to [facility] for gastritis and right lower quadrant pain.  This has been going on for the last few months associated with nausea but no vomiting.  She's been having one of dark stools.  Never had this problem before.  She had previous negative colonoscopy.  Colon polyp was found and biopsied. Surgical pathology 07/04/18 revealed invasive poorly differentiated adenocarcinoma in ascending colon. CT abdomen and pelvis 07/06/18 showed mass within the ascending colon with large mercy mass in the adjacent mesentery and prominent nodes around the cecum. The patient presented to Dr Juarez for elective right hemicolectomy on 08/01/18. Pathology 08/01/18 from colon and terminal ileum resection revealed poorly differentiated colonic adenocarcinoma ([id number]) with metastatic adenocarcinoma to 1 of 25 lymph nodes.  She was started on adjuvant chemotherapy using oxaliplatin with Xeloda September 25, 2018.  The patient completed 5 cycles and then decide to stop secondary to multiple side effects.  Repeated scans done on March 16, 2019 revealed new liver metastases.  She was started on palliative chemotherapy with FOLFIRI plus Avastin March 25, 2019.  The patient was admitted with altered mental status and chemotherapy was discontinued. Her neurologic changes were thought to be secondary to TIAs with dementia type changes. She is here today for one month follow up.       SUBJECTIVE: The patient is here today with her nieces and her granddaughter. She has been doing much better since her last visit. She has had less issue with confusion, has been more active with ambulation, and feels significantly better  being off chemotherapy. She continues to complains of constipation. She has only tried Miralax and stool softeners as needed, but these have not been helpful. She is not taking anything on a regular basis. Her pain is under good control. She notes her appetite has decreased since el off her Megace, however she was told this could have increased her confusion and therefore does not want to restart it if possible. She is wondering if there are any other options. She is not interested in restarting active treatment at this time, however she does not feel she needs hospice referral yet.     PAST MEDICAL HISTORY/SOCIAL HISTORY/FAMILY HISTORY: Reviewed by me and unchanged from my documentation done on 09/17/18.    Review of Systems   Constitutional: Positive for appetite change, fatigue and unexpected weight change. Negative for activity change, chills and fever.   HENT: Positive for sore throat. Negative for congestion, hearing loss, mouth sores, nosebleeds, postnasal drip and trouble swallowing.    Eyes: Negative for visual disturbance.   Respiratory: Negative for cough, chest tightness, shortness of breath and wheezing.    Cardiovascular: Negative for chest pain, palpitations and leg swelling.        Elevated blood pressure   Gastrointestinal: Positive for constipation. Negative for abdominal distention, abdominal pain, blood in stool, diarrhea, nausea, rectal pain and vomiting.   Endocrine: Negative for cold intolerance and heat intolerance.   Genitourinary: Positive for dysuria and frequency. Negative for difficulty urinating and urgency.   Musculoskeletal: Positive for arthralgias. Negative for back pain, gait problem, joint swelling and myalgias.   Skin: Negative for rash.   Neurological: Positive for weakness. Negative for dizziness, tremors, syncope, speech difficulty, light-headedness, numbness and headaches.   Hematological: Negative for adenopathy. Does not bruise/bleed easily.   Psychiatric/Behavioral:  "Negative for confusion, sleep disturbance and suicidal ideas. The patient is not nervous/anxious.          Current Outpatient Medications:   •  aspirin 81 MG EC tablet, Take 1 tablet by mouth Daily., Disp: 30 tablet, Rfl: 0  •  atenolol (TENORMIN) 50 MG tablet, Take 50 mg by mouth Daily., Disp: , Rfl:   •  CloNIDine (CATAPRES) 0.1 MG tablet, Take 0.1 mg by mouth 2 (Two) Times a Day., Disp: , Rfl:   •  donepezil (ARICEPT) 5 MG tablet, Take 1 tablet by mouth Daily., Disp: 30 tablet, Rfl: 0  •  hydroCHLOROthiazide (HYDRODIURIL) 25 MG tablet, Take 25 mg by mouth Daily., Disp: , Rfl:   •  HYDROcodone-acetaminophen (NORCO) 7.5-325 MG per tablet, Take 1 tablet by mouth Every 6 (Six) Hours As Needed for Moderate Pain ., Disp: 90 tablet, Rfl: 0  •  melatonin 5 MG tablet tablet, Take 1 tablet by mouth Every Night., Disp: 30 tablet, Rfl: 0  •  ondansetron (ZOFRAN) 4 MG tablet, , Disp: , Rfl:   •  roflumilast (DALIRESP) 500 MCG tablet tablet, Take 500 mcg by mouth Daily., Disp: , Rfl:   •  theophylline (THEODUR) 300 MG 12 hr tablet, Take 300 mg by mouth Daily., Disp: , Rfl:     PHYSICAL EXAMINATION:   /78   Pulse 53   Temp 97 °F (36.1 °C) (Temporal)   Resp 16   Ht 162.6 cm (64\")   Wt 66.2 kg (146 lb)   SpO2 97%   BMI 25.06 kg/m²    ECOG Performance Status: 1 - Symptomatic but completely ambulatory  General Appearance:  alert, cooperative, no apparent distress and frail appearing   Neurologic/Psychiatric: Oriented to person, place and time, speech seems appropriate without acute aphasia, muscle strength 4/5, gait improved.    HEENT:  Normocephalic, without obvious abnormality, mucous membranes moist.   Neck: Supple, symmetrical, trachea midline, no adenopathy;  No thyromegaly, masses, or tenderness   Lungs:   Clear to auscultation bilaterally; respirations regular, even, and unlabored bilaterally   Heart:  Regular rate and rhythm, no murmurs appreciated   Abdomen:   Soft, non-distended and no organomegaly, bloating " related to constipation.    Lymph nodes: No cervical, supraclavicular, inguinal or axillary adenopathy noted   Extremities: Normal, atraumatic; no clubbing, cyanosis, in wheelchair, trace edema in lower extremities bilaterally   Skin: No rashes, ulcers, or suspicious lesions noted.      No visits with results within 2 Week(s) from this visit.   Latest known visit with results is:   Admission on 10/22/2019, Discharged on 10/25/2019   No results displayed because visit has over 200 results.           No results found.    ASSESSMENT: The patient is a very pleasant 79 y.o. 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  Ascending colon adenocarcinoma T3 N1 M0 stage IIIa:  A.  Presenting with abdominal pain and irregular bowel movements  B.  Diagnosed after colonoscopy with a biopsy done on June 27, 2018  C.  Status post right hemicolectomy done by Dr. Wong August 01, 2018  D.  Final pathology revealed with 1 out of 25 positive mesenteric lymph nodes  E. Started adjuvant chemotherapy using Xeloda with oxaliplatin September 25, 2018, status post 5 cycles  F. CEA 17.8 March 25, 2019  G.  Progressive disease with diffuse liver metastases dominant CAT scan done on March 2019  H. Started on palliative chemotherapy with FOLFIRI plus Avastin March 25, 2019, status post 12 cycles.  I.  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  J. Held chemotherapy for hospital admission on 10/22/2019 secondary to altered mental status and weakness.   2.  Hypertension  3.  Normocytic anemia  4.  Chemotherapy-induced mucositis  5.  Chemotherapy-induced nausea  6. Right hip pain  7. Port cellulitis- s/p port removal 7/19/2019.  8. Altered mental status- improved with Aricept and discontinuation of Megace  9.  Ischemic changes noted on CT done 10/20/2019.   10. Leukocytosis with recent hospital admission- procalcitonin and lactic acid negative.    PLAN:   1. The patient is not interested in active treatment at this time. She has improved clinically since being off chemotherapy, and is not interested in restarting currently. 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 We will go ahead and get this approved by insurance in case she changes her mind. We could also consider regorafenib for treatment as well.   2. We will start the patient on Decadron 2 mg daily to see if this helps with appetite and energy. She was unable to tolerate Megace secondary to confusion, however she has lost some weight since her last visit.   3. We will start the patient on Lactulose every 6 hours as needed for constipation with dialy regimen of stool softeners twice a day and Miralax 1-2 times per day for chronic constipation.   4. She will continue Aricept 5 mg daily for dementia an altered mental status.   5. She will continue  Zofran as needed for chemotherapy induced-nausea.  6. She will continue Norco 7.5/325 mg every 6 hours as needed for cancer related pain.   7. We will see the patient back in 1 month. I asked her to call sooner with any new complaints of symptoms.     Liana Cervantes, APRN  12/3/2019

## 2020-01-07 ENCOUNTER — APPOINTMENT (OUTPATIENT)
Dept: ONCOLOGY | Facility: HOSPITAL | Age: 80
End: 2020-01-07

## 2020-01-07 ENCOUNTER — OFFICE VISIT (OUTPATIENT)
Dept: ONCOLOGY | Facility: CLINIC | Age: 80
End: 2020-01-07

## 2020-01-07 VITALS
HEIGHT: 64 IN | HEART RATE: 55 BPM | TEMPERATURE: 97.8 F | OXYGEN SATURATION: 96 % | WEIGHT: 138 LBS | BODY MASS INDEX: 23.56 KG/M2 | SYSTOLIC BLOOD PRESSURE: 134 MMHG | RESPIRATION RATE: 16 BRPM | DIASTOLIC BLOOD PRESSURE: 79 MMHG

## 2020-01-07 DIAGNOSIS — C18.9 MALIGNANT NEOPLASM OF COLON, UNSPECIFIED PART OF COLON (HCC): Primary | ICD-10-CM

## 2020-01-07 PROCEDURE — 99214 OFFICE O/P EST MOD 30 MIN: CPT | Performed by: INTERNAL MEDICINE

## 2020-01-07 RX ORDER — MEGESTROL ACETATE 40 MG/ML
800 SUSPENSION ORAL DAILY
Qty: 480 ML | Refills: 5 | Status: SHIPPED | OUTPATIENT
Start: 2020-01-07

## 2020-01-07 NOTE — PROGRESS NOTES
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     HISTORY OF PRESENT ILLNESS: The patient is a very pleasant 79 y.o. female  with past medical history significant for colon cancer diagnosed 07/04/18. The patient presented to [facility] for gastritis and right lower quadrant pain.  This has been going on for the last few months associated with nausea but no vomiting.  She's been having one of dark stools.  Never had this problem before.  She had previous negative colonoscopy.  Colon polyp was found and biopsied. Surgical pathology 07/04/18 revealed invasive poorly differentiated adenocarcinoma in ascending colon. CT abdomen and pelvis 07/06/18 showed mass within the ascending colon with large mercy mass in the adjacent mesentery and prominent nodes around the cecum. The patient presented to Dr Juarez for elective right hemicolectomy on 08/01/18. Pathology 08/01/18 from colon and terminal ileum resection revealed poorly differentiated colonic adenocarcinoma ([id number]) with metastatic adenocarcinoma to 1 of 25 lymph nodes.  She was started on adjuvant chemotherapy using oxaliplatin with Xeloda September 25, 2018.  The patient completed 5 cycles and then decide to stop secondary to multiple side effects.  Repeated scans done on March 16, 2019 revealed new liver metastases.  She was started on palliative chemotherapy with FOLFIRI plus Avastin March 25, 2019.  The patient was admitted with altered mental status and chemotherapy was discontinued. Her neurologic changes were thought to be secondary to TIAs with dementia type changes. She is here today for one month follow up.       SUBJECTIVE: The patient is here today with her nieces and her granddaughter.  She is complaining of increased pain in her right upper quadrant.  She is been having poor appetite.  Denies any fever chills but has been having  constipation.    PAST MEDICAL HISTORY/SOCIAL HISTORY/FAMILY HISTORY: Reviewed by me and unchanged from my documentation done on 09/17/18.    Review of Systems   Constitutional: Positive for appetite change, fatigue and unexpected weight change. Negative for activity change, chills and fever.   HENT: Positive for sore throat. Negative for congestion, hearing loss, mouth sores, nosebleeds, postnasal drip and trouble swallowing.    Eyes: Negative for visual disturbance.   Respiratory: Negative for cough, chest tightness, shortness of breath and wheezing.    Cardiovascular: Negative for chest pain, palpitations and leg swelling.        Elevated blood pressure   Gastrointestinal: Positive for constipation. Negative for abdominal distention, abdominal pain, blood in stool, diarrhea, nausea, rectal pain and vomiting.   Endocrine: Negative for cold intolerance and heat intolerance.   Genitourinary: Positive for dysuria and frequency. Negative for difficulty urinating and urgency.   Musculoskeletal: Positive for arthralgias. Negative for back pain, gait problem, joint swelling and myalgias.   Skin: Negative for rash.   Neurological: Positive for weakness. Negative for dizziness, tremors, syncope, speech difficulty, light-headedness, numbness and headaches.   Hematological: Negative for adenopathy. Does not bruise/bleed easily.   Psychiatric/Behavioral: Negative for confusion, sleep disturbance and suicidal ideas. The patient is not nervous/anxious.          Current Outpatient Medications:   •  aspirin 81 MG EC tablet, Take 1 tablet by mouth Daily., Disp: 30 tablet, Rfl: 0  •  atenolol (TENORMIN) 50 MG tablet, Take 50 mg by mouth Daily., Disp: , Rfl:   •  CloNIDine (CATAPRES) 0.1 MG tablet, Take 0.1 mg by mouth 2 (Two) Times a Day., Disp: , Rfl:   •  dexamethasone (DECADRON) 4 MG tablet, Take 0.5 tablets by mouth Daily With Breakfast., Disp: 30 tablet, Rfl: 2  •  donepezil (ARICEPT) 5 MG tablet, Take 1 tablet by mouth Daily.,  "Disp: 30 tablet, Rfl: 0  •  hydroCHLOROthiazide (HYDRODIURIL) 25 MG tablet, Take 25 mg by mouth Daily., Disp: , Rfl:   •  HYDROcodone-acetaminophen (NORCO) 7.5-325 MG per tablet, Take 1 tablet by mouth Every 6 (Six) Hours As Needed for Moderate Pain ., Disp: 90 tablet, Rfl: 0  •  lactulose (CHRONULAC) 10 GM/15ML solution, Take 30 mL by mouth 3 (Three) Times a Day As Needed (constipation). PRN constipation, Disp: 240 mL, Rfl: 2  •  melatonin 5 MG tablet tablet, Take 1 tablet by mouth Every Night., Disp: 30 tablet, Rfl: 0  •  ondansetron (ZOFRAN) 4 MG tablet, Take 1 tablet by mouth Every 6 (Six) Hours As Needed for Nausea or Vomiting., Disp: 30 tablet, Rfl: 2  •  roflumilast (DALIRESP) 500 MCG tablet tablet, Take 500 mcg by mouth Daily., Disp: , Rfl:   •  theophylline (THEODUR) 300 MG 12 hr tablet, Take 300 mg by mouth Daily., Disp: , Rfl:     PHYSICAL EXAMINATION:   /79   Pulse 55   Temp 97.8 °F (36.6 °C) (Temporal)   Resp 16   Ht 162.6 cm (64\")   Wt 62.6 kg (138 lb)   SpO2 96%   BMI 23.69 kg/m²    ECOG Performance Status: 1 - Symptomatic but completely ambulatory  General Appearance:  alert, cooperative, no apparent distress and frail appearing   Neurologic/Psychiatric: Oriented to person, place and time, speech seems appropriate without acute aphasia, muscle strength 4/5, gait improved.    HEENT:  Normocephalic, without obvious abnormality, mucous membranes moist.   Neck: Supple, symmetrical, trachea midline, no adenopathy;  No thyromegaly, masses, or tenderness   Lungs:   Clear to auscultation bilaterally; respirations regular, even, and unlabored bilaterally   Heart:  Regular rate and rhythm, no murmurs appreciated   Abdomen:   Soft, non-distended and no organomegaly, bloating related to constipation.    Lymph nodes: No cervical, supraclavicular, inguinal or axillary adenopathy noted   Extremities: Normal, atraumatic; no clubbing, cyanosis, in wheelchair, trace edema in lower extremities bilaterally "   Skin: No rashes, ulcers, or suspicious lesions noted.      No visits with results within 2 Week(s) from this visit.   Latest known visit with results is:   No results displayed because visit has over 200 results.           No results found.    ASSESSMENT: The patient is a very pleasant 79 y.o. 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  Ascending colon adenocarcinoma T3 N1 M0 stage IIIa:  A.  Presenting with abdominal pain and irregular bowel movements  B.  Diagnosed after colonoscopy with a biopsy done on June 27, 2018  C.  Status post right hemicolectomy done by Dr. Wong August 01, 2018  D.  Final pathology revealed with 1 out of 25 positive mesenteric lymph nodes  E. Started adjuvant chemotherapy using Xeloda with oxaliplatin September 25, 2018, status post 5 cycles  F. CEA 17.8 March 25, 2019  G.  Progressive disease with diffuse liver metastases dominant CAT scan done on March 2019  H. Started on palliative chemotherapy with FOLFIRI plus Avastin March 25, 2019, status post 12 cycles.  I.  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  J. Held chemotherapy for hospital admission on 10/22/2019 secondary to altered mental status and weakness.   2.  Hypertension  3.  Normocytic anemia  4.  Chemotherapy-induced mucositis  5.  Chemotherapy-induced nausea  6. Right hip pain  7. Port cellulitis- s/p port removal 7/19/2019.  8. Altered mental status- improved with Aricept and discontinuation of Megace  9. Ischemic changes noted on CT done 10/20/2019.   10. Leukocytosis with recent hospital admission- procalcitonin and lactic acid negative.    PLAN:   1.  I assured with the patient her current symptoms are most consistent with disease progression.  I do recommend repeat imaging for  staging purposes followed by systemic treatment most likely with immunotherapy since patient has Benton syndrome.  The patient is still not interested in active treatment at this time.  Her family are with her today and they are all in agreement to continue with supportive care alone.  2.  I will continue the patient on Decadron 2 mg daily.  3. We will start the patient on Colace daily plus lactulose every 6 hours as needed for chronic constipation.   4. She will continue Aricept 5 mg daily for dementia an altered mental status.   5. She will continue  Zofran as needed for chemotherapy induced-nausea.  6. She will continue Norco 7.5/325 mg every 6 hours as needed for cancer related pain.  The patient was encouraged to take it every 6 hours as needed, according to family has been very reluctant to use this and she is doing this very rarely.  7. We will see the patient back in 2 months. I asked her to call sooner with any new complaints of symptoms.   8.  We will consider adding hospice services if needed.  Schuyler Coleman MD  1/7/2020

## 2020-01-22 ENCOUNTER — TELEPHONE (OUTPATIENT)
Dept: ONCOLOGY | Facility: CLINIC | Age: 80
End: 2020-01-22

## 2020-01-22 NOTE — TELEPHONE ENCOUNTER
Casandra with hospice called patient's family called to do a referral. Family said home health was supposed to do this and they didn't. She needs medical records faxed over on this patient [phone] and wants to know if Dr. Coleman will follow this patient?

## 2020-03-10 ENCOUNTER — TELEPHONE (OUTPATIENT)
Dept: ONCOLOGY | Facility: CLINIC | Age: 80
End: 2020-03-10

## 2020-03-10 NOTE — TELEPHONE ENCOUNTER
Kassi from [facility] is requesting pt's last office note be faxed to her @ [phone]    Kassi contact # [phone] ext [phone]

## 2020-03-10 NOTE — TELEPHONE ENCOUNTER
Spoke with Kassi who is requesting any records that document a skin rash on patient as they had done a diagnostic mammogram recently on her, and the radiologist was requesting these records. Advised her that we have not seen patient since 1/22/20 when she was referred to hospice for her colon cancer, so we have no documentation showing this. Gave her patient's PCP information, as they may be able to help her more.

## 2020-09-23 NOTE — PROGRESS NOTES
I called [facility] and talked with a lady in the Radiology Department. I asked if they have sent out the patient's disc. I had sent a Fax cover request yesterday. The lady said that it is written down that the disc went out yesterday evening. AG  
No indicators present

## 2022-11-17 NOTE — PAT
Eras information given to patient and explained.      Gi nurse notified of surgery at this time.    Dutasteride Pregnancy And Lactation Text: This medication is absolutely contraindicated in women, especially during pregnancy and breast feeding. Feminization of male fetuses is possible if taking while pregnant.

## 2023-09-15 NOTE — PROGRESS NOTES
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     HISTORY OF PRESENT ILLNESS: The patient is a very pleasant 79 y.o. female  with past medical history significant for colon cancer diagnosed 07/04/18. The patient presented to [facility] for gastritis and right lower quadrant pain.  This has been going on for the last few months associated with nausea but no vomiting.  She's been having one of dark stools.  Never had this problem before.  She had previous negative colonoscopy.  Colon polyp was found and biopsied. Surgical pathology 07/04/18 revealed invasive poorly differentiated adenocarcinoma in ascending colon. CT abdomen and pelvis 07/06/18 showed mass within the ascending colon with large mercy mass in the adjacent mesentery and prominent nodes around the cecum. The patient presented to Dr Juarez for elective right hemicolectomy on 08/01/18. Pathology 08/01/18 from colon and terminal ileum resection revealed poorly differentiated colonic adenocarcinoma ([id number]) with metastatic adenocarcinoma to 1 of 25 lymph nodes.  She was started on adjuvant chemotherapy using oxaliplatin with Xeloda September 25, 2018.  The patient completed 5 cycles and then decide to stop secondary to multiple side effects.  Repeated scans done on March 16, 2019 revealed new liver metastases.  She was started on palliative chemotherapy with FOLFIRI plus Avastin March 25, 2019.  The patient is here today for follow up visit was cycle #9.        SUBJECTIVE: The patient is here today with her niece. She has been doing fairly well. She complains of pain in her right hip/buttocks that started about 3-4 days ago. It is worse when she starts to walk from a sitting position. She tried ibuprofen some, but only minimally. She also complains of redness and aching to her port site. This has been intermittent over the past couple treatments, but  has gotten worse over the past week. She notes redness with an area of blistering. She has been using neosporin but this hasn't helped much. She did not notice much improvement in her sore throat with dose reduction, however she is still eating and drinking well. The Magic mouthwash does give her some relief.     PAST MEDICAL HISTORY/SOCIAL HISTORY/FAMILY HISTORY: Reviewed by me and unchanged from my documentation done on 09/17/18.    Review of Systems   Constitutional: Positive for fatigue. Negative for activity change, appetite change, chills, fever and unexpected weight change.   HENT: Positive for congestion and sore throat. Negative for hearing loss, mouth sores, nosebleeds, postnasal drip and trouble swallowing.    Eyes: Negative for visual disturbance.   Respiratory: Negative for cough, chest tightness, shortness of breath and wheezing.    Cardiovascular: Negative for chest pain, palpitations and leg swelling.        Elevated blood pressure   Gastrointestinal: Negative for abdominal distention, abdominal pain, blood in stool, constipation, diarrhea, nausea, rectal pain and vomiting.   Endocrine: Negative for cold intolerance and heat intolerance.   Genitourinary: Negative for difficulty urinating, dysuria, frequency and urgency.   Musculoskeletal: Positive for gait problem. Negative for arthralgias, back pain, joint swelling and myalgias.        Right hip/buttocks pain   Skin: Negative for rash.        Redness to port site   Neurological: Negative for dizziness, tremors, syncope, weakness, light-headedness, numbness and headaches.   Hematological: Negative for adenopathy. Does not bruise/bleed easily.   Psychiatric/Behavioral: Negative for confusion, sleep disturbance and suicidal ideas. The patient is not nervous/anxious.          Current Outpatient Medications:   •  atenolol (TENORMIN) 50 MG tablet, Take 50 mg by mouth Daily., Disp: , Rfl:   •  CloNIDine (CATAPRES) 0.1 MG tablet, , Disp: , Rfl:   •   "lidocaine-prilocaine (EMLA) 2.5-2.5 % cream, Apply  topically to the appropriate area as directed As Needed (45-60 minutes prior to port access.  Cover with saran/plastic wrap.)., Disp: 30 g, Rfl: 3  •  megestrol (MEGACE) 40 MG/ML suspension, Take 20 mL by mouth Daily., Disp: 480 mL, Rfl: 5  •  nystatin susp + lidocaine viscous (MAGIC MOUTHWASH) oral suspension, 5-10 ml swish and spit or swallow QID prn, Disp: 240 mL, Rfl: 3  •  ondansetron (ZOFRAN) 8 MG tablet, Take 1 tablet by mouth Every 8 (Eight) Hours As Needed for Nausea., Disp: 60 tablet, Rfl: 5  •  raNITIdine (ZANTAC) 150 MG tablet, Take 150 mg by mouth As Needed for Heartburn., Disp: , Rfl:     PHYSICAL EXAMINATION:   /91   Pulse 64   Temp 97 °F (36.1 °C) (Temporal)   Resp 16   Ht 160 cm (63\")   Wt 70.8 kg (156 lb)   BMI 27.63 kg/m²    ECOG Performance Status: 2 - Symptomatic, <50% confined to bed  General Appearance:  alert, cooperative, no apparent distress and appears stated age   Neurologic/Psychiatric: A&O x 3, gait steady, appropriate affect, strength 5/5 in all muscle groups   HEENT:  Normocephalic, without obvious abnormality, mucous membranes moist, mild erythema noted to throat with tender cervical adenopathy on the left, no coating to tongue, no ulceration, mild erythema noted to oropharynx.    Neck: Supple, symmetrical, trachea midline, no adenopathy;  No thyromegaly, masses, or tenderness   Lungs:   Clear to auscultation bilaterally; respirations regular, even, and unlabored bilaterally   Heart:  Regular rate and rhythm, no murmurs appreciated   Abdomen:   Soft, non-tender, non-distended and no organomegaly   Lymph nodes: No cervical, supraclavicular, inguinal or axillary adenopathy noted   Extremities: Normal, atraumatic; no clubbing, cyanosis, or edema, ambulating with cane   Skin: No rashes, ulcers, or suspicious lesions noted     No visits with results within 2 Week(s) from this visit.   Latest known visit with results is: "   Hospital Outpatient Visit on 07/01/2019   Component Date Value Ref Range Status   • CEA 07/01/2019 11.40  ng/mL Final   • Glucose 07/01/2019 125* 65 - 99 mg/dL Final   • BUN 07/01/2019 18  8 - 23 mg/dL Final   • Creatinine 07/01/2019 0.71  0.57 - 1.00 mg/dL Final   • Sodium 07/01/2019 135* 136 - 145 mmol/L Final   • Potassium 07/01/2019 4.0  3.5 - 5.2 mmol/L Final   • Chloride 07/01/2019 105  98 - 107 mmol/L Final   • CO2 07/01/2019 17.0* 22.0 - 29.0 mmol/L Final   • Calcium 07/01/2019 8.3* 8.6 - 10.5 mg/dL Final   • Total Protein 07/01/2019 6.8  6.0 - 8.5 g/dL Final   • Albumin 07/01/2019 3.70  3.50 - 5.20 g/dL Final   • ALT (SGPT) 07/01/2019 12  1 - 33 U/L Final   • AST (SGOT) 07/01/2019 15  1 - 32 U/L Final   • Alkaline Phosphatase 07/01/2019 101  39 - 117 U/L Final   • Total Bilirubin 07/01/2019 0.5  0.2 - 1.2 mg/dL Final   • eGFR Non African Amer 07/01/2019 79  >60 mL/min/1.73 Final   • Globulin 07/01/2019 3.1  gm/dL Final   • A/G Ratio 07/01/2019 1.2  g/dL Final   • BUN/Creatinine Ratio 07/01/2019 25.4* 7.0 - 25.0 Final   • Anion Gap 07/01/2019 13.0  5.0 - 15.0 mmol/L Final   • Color, UA 07/01/2019 Yellow  Yellow, Straw Final   • Appearance, UA 07/01/2019 Clear  Clear Final   • pH, UA 07/01/2019 6.0  5.0 - 8.0 Final   • Specific Gravity, UA 07/01/2019 1.025  1.005 - 1.030 Final   • Glucose, UA 07/01/2019 Negative  Negative Final   • Ketones, UA 07/01/2019 Negative  Negative Final   • Bilirubin, UA 07/01/2019 Small (1+)* Negative Final   • Blood, UA 07/01/2019 Negative  Negative Final   • Protein, UA 07/01/2019 30 mg/dL (1+)* Negative Final   • Leuk Esterase, UA 07/01/2019 Negative  Negative Final   • Nitrite, UA 07/01/2019 Negative  Negative Final   • Urobilinogen, UA 07/01/2019 0.2 E.U./dL  0.2 - 1.0 E.U./dL Final   • WBC 07/01/2019 13.80* 3.40 - 10.80 10*3/mm3 Final   • RBC 07/01/2019 3.64* 3.77 - 5.28 10*6/mm3 Final   • Hemoglobin 07/01/2019 11.1* 12.0 - 15.9 g/dL Final   • Hematocrit 07/01/2019 33.4*  34.0 - 46.6 % Final   • RDW 07/01/2019 19.9* 12.3 - 15.4 % Final   • MCV 07/01/2019 91.9  79.0 - 97.0 fL Final   • MCH 07/01/2019 30.5  26.6 - 33.0 pg Final   • MCHC 07/01/2019 33.2  31.5 - 35.7 g/dL Final   • MPV 07/01/2019 7.0  6.0 - 12.0 fL Final   • Platelets 07/01/2019 199  140 - 450 10*3/mm3 Final   • Neutrophil % 07/01/2019 34.8* 42.7 - 76.0 % Final   • Lymphocyte % 07/01/2019 58.1* 19.6 - 45.3 % Final   • Monocyte % 07/01/2019 7.1  5.0 - 12.0 % Final   • Neutrophils, Absolute 07/01/2019 4.80  1.70 - 7.00 10*3/mm3 Final   • Lymphocytes, Absolute 07/01/2019 8.00* 0.70 - 3.10 10*3/mm3 Final   • Monocytes, Absolute 07/01/2019 1.00* 0.10 - 0.90 10*3/mm3 Final        No results found.    ASSESSMENT: The patient is a very pleasant 79 y.o. 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  Ascending colon adenocarcinoma T3 N1 M0 stage IIIa:  A.  Presenting with abdominal pain and irregular bowel movements  B.  Diagnosed after colonoscopy with a biopsy done on June 27, 2018  C.  Status post right hemicolectomy done by Dr. Wong August 01, 2018  D.  Final pathology revealed with 1 out of 25 positive mesenteric lymph nodes  E. Started adjuvant chemotherapy using Xeloda with oxaliplatin September 25, 2018, status post 5 cycles  F. CEA 17.8 March 25, 2019  G.  Progressive disease with diffuse liver metastases dominant CAT scan done on March 2019  H. Started on palliative chemotherapy with FOLFIRI plus Avastin March 25, 2019, status post 7 cycles.  I.  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  2.  Hypertension  3.  Normocytic anemia  4.  Chemotherapy-induced mucositis  5.  Chemotherapy-induced nausea  6. Right hip pain  7. Port cellulitis     PLAN:   1. I will proceed with palliative  chemotherapy with FOLFIRI plus Avastin as scheduled today cycle #9. We will continue 25% dose reduction on her 5-FU secondary to mucositis.   2.   I will continue to monitor the patient's blood work including blood counts, kidney function, liver functions, and electrolytes.  We will also check CEA on day 1 of each cycle. I told her it decreased to 11.4 with her last treatment.  3.  We will add Keflex 500 mg TID for 7 days for cellulitis symptoms around port site. We will call [facility] to see if she can be evaluated today due to her worsening symptoms. She will continue EMLA cream applied prior to access. I asked her to monitor for signs and symptoms of infection.    4.  The patient had microsatellite instability identified on gene analysis. We will consider immunotherapy in the future if needed for progression on current second line therapy.   5. We will start her on Medrol dose pack for right hip pain and inflammation. She was also given a prescription for Norco 7.5/325 mg taken three times per day as needed for hip pain. She was given a new prescription for this today. I told her to take both medications with food.   6.  We discussed potential side effects of chemotherapy including diarrhea, colitis, low blood counts, infusion reactions, dehydration, infection, and potential death.  7.  She will continue  Zofran as needed for chemotherapy induced-nausea.  8.  She will follow-up with me in 2 weeks for cycle #10.  9.  We will repeat CAT scans in prior to cycle # 12, which will be 3 month follow up.  10.  We will continue atenolol 50 mg daily as well as clonidine 0.1 mg twice a day for hypertension. I asked her to continue to monitor her blood pressure at home. We will adjust her regimen if needed for ongoing hypertension.       Liana Cervantes, APRN  7/15/2019   Strong peripheral pulses

## (undated) DEVICE — GLV SURG BIOGEL LTX PF 7 1/2

## (undated) DEVICE — SYR LL TP 10ML STRL

## (undated) DEVICE — CANNULA,OXY,ADULT,SUPERSOFT,W/7'TUB,UC: Brand: MEDLINE

## (undated) DEVICE — ANTIBACTERIAL UNDYED BRAIDED (POLYGLACTIN 910), SYNTHETIC ABSORBABLE SUTURE: Brand: COATED VICRYL

## (undated) DEVICE — SYR LUERLOK 30CC

## (undated) DEVICE — ADHESIVE ISLAND DRESSING: Brand: TELFA

## (undated) DEVICE — DRSNG SURESITE123 2.4X2.8IN

## (undated) DEVICE — TOWEL,OR,DSP,ST,BLUE,STD,4/PK,20PK/CS: Brand: MEDLINE

## (undated) DEVICE — SUT VIC 12X27 D8116 BX/12

## (undated) DEVICE — 3M™ IOBAN™ 2 ANTIMICROBIAL INCISE DRAPE 6650EZ: Brand: IOBAN™ 2

## (undated) DEVICE — INTENDED FOR TISSUE SEPARATION, AND OTHER PROCEDURES THAT REQUIRE A SHARP SURGICAL BLADE TO PUNCTURE OR CUT.: Brand: BARD-PARKER ® STAINLESS STEEL BLADES

## (undated) DEVICE — LEX GENERAL ABDOMINAL SPLIT: Brand: MEDLINE INDUSTRIES, INC.

## (undated) DEVICE — COVADERM PLUS: Brand: DEROYAL

## (undated) DEVICE — MEDI-VAC NON-CONDUCTIVE SUCTION TUBING: Brand: CARDINAL HEALTH

## (undated) DEVICE — COVER,LIGHT HANDLE,FLX,1/PK: Brand: MEDLINE INDUSTRIES, INC.

## (undated) DEVICE — SUT PROLN 3/0 SH D/A 36IN 8522H

## (undated) DEVICE — 3M™ STERI-STRIP™ REINFORCED ADHESIVE SKIN CLOSURES, R1547, 1/2 IN X 4 IN (12 MM X 100 MM), 6 STRIPS/ENVELOPE: Brand: 3M™ STERI-STRIP™

## (undated) DEVICE — SKIN AFFIX SURG ADHESIVE 72/CS 0.55ML: Brand: MEDLINE

## (undated) DEVICE — ENCORE® LATEX MICRO SIZE 8, STERILE LATEX POWDER-FREE SURGICAL GLOVE: Brand: ENCORE

## (undated) DEVICE — APPL CHLORAPREP W/TINT 26ML BLU

## (undated) DEVICE — CLTH CLENS READYCLEANSE PERI CARE PK/5

## (undated) DEVICE — ST ACC MICROPUNCTURE .018 TRANSLSS/SS/TP 5F/10CM 21G/7CM

## (undated) DEVICE — ADHS LIQ MASTISOL 2/3ML

## (undated) DEVICE — COVER,MAYO STAND,XL,STERILE: Brand: MEDLINE

## (undated) DEVICE — CANN NASL CO2 DIVIDED A/

## (undated) DEVICE — PENCL E/S HNDSWCH ROCKRBTN HOLSTR 10FT

## (undated) DEVICE — GLV SURG SENSICARE MICRO PF LF 7 STRL

## (undated) DEVICE — TOTAL TRAY, 16FR 10ML SIL FOLEY, URN: Brand: MEDLINE

## (undated) DEVICE — TUBING, SUCTION, 1/4" X 10', STRAIGHT: Brand: MEDLINE

## (undated) DEVICE — MEDI-VAC YANKAUER SUCTION HANDLE W/BULBOUS TIP: Brand: CARDINAL HEALTH

## (undated) DEVICE — SUT PROLN 2/0 V7 36IN 8977H

## (undated) DEVICE — SPNG LAP PREWSH SFTPK 18X18IN STRL PK/5

## (undated) DEVICE — PK MINOR SPLT 10

## (undated) DEVICE — ENCORE® LATEX MICRO SIZE 6.5, STERILE LATEX POWDER-FREE SURGICAL GLOVE: Brand: ENCORE

## (undated) DEVICE — SAFESECURE,SECUREMENT,FOLEY CATH,STERILE: Brand: MEDLINE

## (undated) DEVICE — INTRAOPERATIVE COVER KIT, 10 PACK: Brand: SITE-RITE

## (undated) DEVICE — GLV SURG PREMIERPRO MIC LTX PF SZ6.5 BRN

## (undated) DEVICE — NDL HYPO ECLPS SFTY 22G 1 1/2IN

## (undated) DEVICE — SYR LL 3CC

## (undated) DEVICE — SYR CONTRL LUERLOK 10CC

## (undated) DEVICE — MEDI-VAC YANKAUER SUCTION HANDLE: Brand: CARDINAL HEALTH

## (undated) DEVICE — DRSNG SURESITE WNDW 4X4.5

## (undated) DEVICE — GLV SURG SENSICARE MICRO PF LF 8.5 STRL

## (undated) DEVICE — SNAP KOVER: Brand: UNBRANDED

## (undated) DEVICE — SUT MNCRYL PLS ANTIB UD 4/0 PS2 18IN

## (undated) DEVICE — GOWN,NON-REINFORCED,SIRUS,SET IN SLV,XL: Brand: MEDLINE

## (undated) DEVICE — DRSNG TELFA PAD NONADH STR 1S 3X4IN

## (undated) DEVICE — SUT SILK 3/0 TIES 18IN A184H

## (undated) DEVICE — SYR LUERLOK 20CC

## (undated) DEVICE — SUT PDS 1 CTX 36IN VIO PDP371T

## (undated) DEVICE — SUT ETHLN 2/0 PS 18IN 585H

## (undated) DEVICE — GLV SURG DERMASSURE GRN LF PF 7.0

## (undated) DEVICE — AIRWY 90MM NO9

## (undated) DEVICE — DECANT BG O JET